# Patient Record
Sex: FEMALE | Race: BLACK OR AFRICAN AMERICAN | NOT HISPANIC OR LATINO
[De-identification: names, ages, dates, MRNs, and addresses within clinical notes are randomized per-mention and may not be internally consistent; named-entity substitution may affect disease eponyms.]

---

## 2017-04-09 ENCOUNTER — RESULT REVIEW (OUTPATIENT)
Age: 72
End: 2017-04-09

## 2017-09-28 ENCOUNTER — RESULT REVIEW (OUTPATIENT)
Age: 72
End: 2017-09-28

## 2019-04-23 ENCOUNTER — APPOINTMENT (OUTPATIENT)
Dept: ENDOCRINOLOGY | Facility: CLINIC | Age: 74
End: 2019-04-23
Payer: COMMERCIAL

## 2019-04-23 VITALS
BODY MASS INDEX: 26.5 KG/M2 | OXYGEN SATURATION: 97 % | HEART RATE: 89 BPM | WEIGHT: 161 LBS | SYSTOLIC BLOOD PRESSURE: 140 MMHG | RESPIRATION RATE: 16 BRPM | DIASTOLIC BLOOD PRESSURE: 70 MMHG | HEIGHT: 65.5 IN

## 2019-04-23 DIAGNOSIS — Z78.9 OTHER SPECIFIED HEALTH STATUS: ICD-10-CM

## 2019-04-23 DIAGNOSIS — E04.9 NONTOXIC GOITER, UNSPECIFIED: ICD-10-CM

## 2019-04-23 LAB — GLUCOSE BLDC GLUCOMTR-MCNC: 197

## 2019-04-23 PROCEDURE — 82962 GLUCOSE BLOOD TEST: CPT

## 2019-04-23 PROCEDURE — 99245 OFF/OP CONSLTJ NEW/EST HI 55: CPT | Mod: 25

## 2019-04-23 RX ORDER — AMLODIPINE BESYLATE 10 MG/1
10 TABLET ORAL
Qty: 30 | Refills: 0 | Status: DISCONTINUED | COMMUNITY
Start: 2019-02-23 | End: 2019-04-23

## 2019-04-23 RX ORDER — AZITHROMYCIN 250 MG/1
250 TABLET, FILM COATED ORAL
Qty: 6 | Refills: 0 | Status: DISCONTINUED | COMMUNITY
Start: 2019-02-02 | End: 2019-04-23

## 2019-04-23 NOTE — HISTORY OF PRESENT ILLNESS
[FreeTextEntry1] : HISTORY OF PRESENT ILLNESS. \par \par Ms. BARBER was diagnosed with Diabetes Mellitus Type 2 in 2014 \par Denies known complications of retinopathy, nephropathy, or neuropathy. \par Reports history HTN, dyslipidemia. Denies CAD. \par Presently on metformin 1g qd to bid, lisinopril 20mg qd. She's been previously on Januvia, but stopped because of GI distress\par Blood sugars are checked once a day. \par Did not bring log book, but reported are typically as following: FBS- 140's\par Hypoglycemia frequency: none\par Fingerstick glucose in the office today is 197 mg/dL 4 hours after eating. \par Diet:not following ADA. \par Exercise: none\par \par Last dilated eye - 04/19\par Last podiatry visit  - 2018\par Last cardiology evaluation - several years ago\par Last stress test - several years ago\par Last 2-D Echo - several years ago\par Last nephrology evaluation - none\par Last neurology evaluation- none\par \par Lab review: a1c- 8.9, ca- 10.7, LDL-  177, creatinine  0.82\par \par Cancer history: none\par Calcium supplementation: none\par Fracture history: traumatic left shoulder, right arm fractures\par Bone disease risk factors: no history of liver disease, kidney disease, thyroid disease, anticonvulsant use, glucocorticoid use, autoimmune disorders such as rheumatoid arthritis and SLE, COPD, IBD, known malabsorption disorders, or weight loss. \par Family history is negative for calcium disorders, nephrolithiasis, pancreatitis and tumors. \par Currently, no symptoms of polyuria, polydipsia, constipation, abdominal pain, mental confusion, depression, bone pain or fractures.

## 2019-04-23 NOTE — REASON FOR VISIT
[Consultation] : a consultation visit [FreeTextEntry1] : uncontrolled diabetes and hyperparathyroidism

## 2019-04-23 NOTE — CONSULT LETTER
[Dear  ___] : Dear  [unfilled], [Sincerely,] : Sincerely, [Courtesy Letter:] : I had the pleasure of seeing your patient, [unfilled], in my office today. [FreeTextEntry3] : Lynn Marina MD, FACE, ECNU\par  [FreeTextEntry1] : Thank you for referring  Ms. SYEDA BARBER to me for evaluation and treatment. Please, see attached consultation note. As always, if there are specific questions you would like to discuss, please feel free to contact me.\par Thank you for the courtesy of this evaluation.\par

## 2019-04-23 NOTE — ASSESSMENT
[FreeTextEntry1] : Current approaches to diabetes management are discussed with the patient. \par Target ranges for blood sugar, blood pressure and cholesterol reviewed, and risk reduction strategies verified. \par Hypoglycemia precautions reviewed with the patient. \par Suggested extensive diabetes education program, including nutritional and diabetes teaching and evaluation. \par Proper dietary restrictions and exercise routines discussed. \par Glucometer/SMBG and log book charting discussed.\par - resume metformin 1000mg bid, add [id number] qd and jardiance 10mg qd (R+B reviewed)\par - Hydration\par - lipitor 20mg HS\par - monitor BP, urine microalbumin\par - repeat calcium/ PTH, 25-OH-D  before next visit. Once D-repleted, will refer for a 24hrs U calcium\par - Thyroid and parathyroid US\par - DXA 3 sites\par - advised on vitamin D supplementation, weight-bearing exercises\par RTC 1 month, labs prior

## 2019-05-01 ENCOUNTER — FORM ENCOUNTER (OUTPATIENT)
Age: 74
End: 2019-05-01

## 2019-05-02 ENCOUNTER — OUTPATIENT (OUTPATIENT)
Dept: OUTPATIENT SERVICES | Facility: HOSPITAL | Age: 74
LOS: 1 days | End: 2019-05-02
Payer: COMMERCIAL

## 2019-05-02 ENCOUNTER — APPOINTMENT (OUTPATIENT)
Dept: RADIOLOGY | Facility: IMAGING CENTER | Age: 74
End: 2019-05-02
Payer: COMMERCIAL

## 2019-05-02 ENCOUNTER — APPOINTMENT (OUTPATIENT)
Dept: ULTRASOUND IMAGING | Facility: IMAGING CENTER | Age: 74
End: 2019-05-02
Payer: COMMERCIAL

## 2019-05-02 DIAGNOSIS — E11.65 TYPE 2 DIABETES MELLITUS WITH HYPERGLYCEMIA: ICD-10-CM

## 2019-05-02 DIAGNOSIS — E83.52 HYPERCALCEMIA: ICD-10-CM

## 2019-05-02 DIAGNOSIS — I10 ESSENTIAL (PRIMARY) HYPERTENSION: ICD-10-CM

## 2019-05-02 DIAGNOSIS — E78.5 HYPERLIPIDEMIA, UNSPECIFIED: ICD-10-CM

## 2019-05-02 DIAGNOSIS — E04.9 NONTOXIC GOITER, UNSPECIFIED: ICD-10-CM

## 2019-05-02 PROCEDURE — 76536 US EXAM OF HEAD AND NECK: CPT | Mod: 26

## 2019-05-02 PROCEDURE — 76536 US EXAM OF HEAD AND NECK: CPT

## 2019-05-02 PROCEDURE — 77080 DXA BONE DENSITY AXIAL: CPT

## 2019-05-02 PROCEDURE — 77080 DXA BONE DENSITY AXIAL: CPT | Mod: 26

## 2019-05-13 ENCOUNTER — APPOINTMENT (OUTPATIENT)
Dept: ENDOCRINOLOGY | Facility: CLINIC | Age: 74
End: 2019-05-13
Payer: COMMERCIAL

## 2019-05-13 VITALS
DIASTOLIC BLOOD PRESSURE: 60 MMHG | WEIGHT: 156 LBS | BODY MASS INDEX: 25.68 KG/M2 | SYSTOLIC BLOOD PRESSURE: 110 MMHG | HEIGHT: 65.5 IN

## 2019-05-13 LAB — GLUCOSE BLDC GLUCOMTR-MCNC: 92

## 2019-05-13 PROCEDURE — 82962 GLUCOSE BLOOD TEST: CPT

## 2019-05-13 PROCEDURE — 99213 OFFICE O/P EST LOW 20 MIN: CPT | Mod: 25

## 2019-05-13 NOTE — ASSESSMENT
[FreeTextEntry1] : - metformin 1000mg bid, onglyza 5mg qd, jardiance 10mg qd for now\par - Hydration\par - lipitor 20mg HS\par - monitor BP, urine microalbumin\par - repeat calcium/ PTH, 25-OH-D.  Once D-repleted, will refer for a 24hrs U calcium\par - Rpt Thyroid US in 6 mos (11/19) to f/u on b/l cysts\par - DXA 3 sites in 05/21\par - I advised to the patient on antiresorptive therapy, and reviewed potential options for osteoporosis treatment, including oral and IV bisphosphonates, prolia. R+B of each options were discussed in details\par - to take  OTC vitamin D3 1,000 IU/day\par - continue weight-bearing exercises; advised avoiding high risk sports\par - prior dental evaluation advised.\par - SHe's not a candidate for  rh-PTH therapy b/o hyperparathyroidism.\par - advised on vitamin D supplementation, weight-bearing exercises\par Patient wants to review her options and get back to me with the decision in 2-3 weeks

## 2019-05-13 NOTE — HISTORY OF PRESENT ILLNESS
[FreeTextEntry1] : On metformin 1g bid, onglyza 5mg, jardiance 10mg\par log: FBS- 125-160, p/B- 126-139, p/L- \par no rpt labs yet\par Thyr US (5/2/19)- b/l colloid cysts. No parathyroid adenoma\par DXA (5/2/19)- severe kyphosis with L1(-2.7), L2 (-3.9), FN (-2.8), radius 33% (-2.6)\par \par HISTORY OF PRESENT ILLNESS. \par \par Ms. BARBER was diagnosed with Diabetes Mellitus Type 2 in 2014 \par Denies known complications of retinopathy, nephropathy, or neuropathy. \par Reports history HTN, dyslipidemia. Denies CAD. \par Presently on metformin 1g qd to bid, lisinopril 20mg qd. She's been previously on Januvia, but stopped because of GI distress\par Blood sugars are checked once a day. \par Did not bring log book, but reported are typically as following: FBS- 140's\par Hypoglycemia frequency: none\par Diet:not following ADA. \par Exercise: none\par \par Last dilated eye - 04/19\par Last podiatry visit  - 2018\par Last cardiology evaluation - several years ago\par Last stress test - several years ago\par Last 2-D Echo - several years ago\par Last nephrology evaluation - none\par Last neurology evaluation- none\par \par Lab review: a1c- 8.9, ca- 10.7, LDL-  177, creatinine  0.82\par \par Cancer history: none\par Calcium supplementation: none\par Fracture history: traumatic left shoulder, right arm fractures\par Bone disease risk factors: no history of liver disease, kidney disease, thyroid disease, anticonvulsant use, glucocorticoid use, autoimmune disorders such as rheumatoid arthritis and SLE, COPD, IBD, known malabsorption disorders, or weight loss. \par Family history is negative for calcium disorders, nephrolithiasis, pancreatitis and tumors. \par Currently, no symptoms of polyuria, polydipsia, constipation, abdominal pain, mental confusion, depression, bone pain or fractures.

## 2019-05-18 ENCOUNTER — LABORATORY RESULT (OUTPATIENT)
Age: 74
End: 2019-05-18

## 2019-06-03 ENCOUNTER — APPOINTMENT (OUTPATIENT)
Dept: ENDOCRINOLOGY | Facility: CLINIC | Age: 74
End: 2019-06-03
Payer: COMMERCIAL

## 2019-06-03 VITALS
SYSTOLIC BLOOD PRESSURE: 146 MMHG | WEIGHT: 154 LBS | HEART RATE: 80 BPM | HEIGHT: 65.5 IN | DIASTOLIC BLOOD PRESSURE: 80 MMHG | BODY MASS INDEX: 25.35 KG/M2 | OXYGEN SATURATION: 96 % | RESPIRATION RATE: 16 BRPM

## 2019-06-03 LAB — GLUCOSE BLDC GLUCOMTR-MCNC: 86

## 2019-06-03 PROCEDURE — 82962 GLUCOSE BLOOD TEST: CPT

## 2019-06-03 PROCEDURE — 99213 OFFICE O/P EST LOW 20 MIN: CPT

## 2019-06-03 NOTE — ASSESSMENT
[FreeTextEntry1] : - metformin 1000mg bid, onglyza 5mg qd, jardiance 10mg qd for now\par - Hydration\par - lipitor 20mg HS\par - monitor BP, urine microalbumin\par - check 24hrs U calcium\par - parathyroid sestamibi scan\par - Rpt Thyroid US in 6 mos (11/19) to f/u on b/l cysts\par - DXA 3 sites in 05/21\par - I advised to the patient on antiresorptive therapy, and reviewed potential options for osteoporosis treatment, including oral and IV bisphosphonates, prolia. R+B of each options were discussed in details. Patient is still undecided on her treatment options, asking for more time to consider\par - to take  OTC vitamin D3 1,000 IU/day\par - continue weight-bearing exercises; advised avoiding high risk sports\par - prior dental evaluation advised.\par - She's not a candidate for  rh-PTH therapy b/o hyperparathyroidism.\par RTC post scan

## 2019-06-03 NOTE — HISTORY OF PRESENT ILLNESS
[FreeTextEntry1] : On metformin 1g bid, onglyza 5mg, jardiance 10mg\par stopped checking BS at home\par today ppg in the office- 86\par \par Thyr US (5/2/19)- b/l colloid cysts. No parathyroid adenoma\par DXA (5/2/19)- severe kyphosis with L1(-2.7), L2 (-3.9), FN (-2.8), radius 33% (-2.6)\par \par ca- 11.2, PTH- 95, 25D- 23.8\par \par HISTORY OF PRESENT ILLNESS. \par \par Ms. BARBER was diagnosed with Diabetes Mellitus Type 2 in 2014 \par Denies known complications of retinopathy, nephropathy, or neuropathy. \par Reports history HTN, dyslipidemia. Denies CAD. \par Presently on metformin 1g qd to bid, lisinopril 20mg qd. She's been previously on Januvia, but stopped because of GI distress\par Blood sugars are checked once a day. \par \par Hypoglycemia frequency: none\par Diet:not following ADA. \par Exercise: none\par \par Last dilated eye - 04/19\par Last podiatry visit  - 2018\par Last cardiology evaluation - several years ago\par Last stress test - several years ago\par Last 2-D Echo - several years ago\par Last nephrology evaluation - none\par Last neurology evaluation- none\par \par Lab review: a1c- 8.9, ca- 10.7, LDL-  177, creatinine  0.82\par \par Cancer history: none\par Calcium supplementation: none\par Fracture history: traumatic left shoulder, right arm fractures\par Bone disease risk factors: no history of liver disease, kidney disease, thyroid disease, anticonvulsant use, glucocorticoid use, autoimmune disorders such as rheumatoid arthritis and SLE, COPD, IBD, known malabsorption disorders, or weight loss. \par Family history is negative for calcium disorders, nephrolithiasis, pancreatitis and tumors. \par Currently, no symptoms of polyuria, polydipsia, constipation, abdominal pain, mental confusion, depression, bone pain or fractures.

## 2019-06-21 LAB
CALCIUM SERPL-MCNC: 11.1 MG/DL
CAU: 5 MG/DL
CREAT 24H UR-MCNC: 0.9 G/24 H
CREAT 24H UR-MCNC: 0.9 G/24 H
CREAT ?TM UR-MCNC: 77 MG/DL
CREAT ?TM UR-MCNC: 77 MG/DL
CREAT SERPL-MCNC: 0.77 MG/DL
PROT ?TM UR-MCNC: 24 HR
PROT ?TM UR-MCNC: 24 HR
SPECIMEN VOL 24H UR: 1175 ML
SPECIMEN VOL 24H UR: 1175 ML
SPECIMEN VOL 24H UR: 59 MG/24 H

## 2019-07-19 ENCOUNTER — APPOINTMENT (OUTPATIENT)
Dept: ENDOCRINOLOGY | Facility: CLINIC | Age: 74
End: 2019-07-19
Payer: COMMERCIAL

## 2019-07-19 VITALS
RESPIRATION RATE: 16 BRPM | WEIGHT: 153 LBS | DIASTOLIC BLOOD PRESSURE: 86 MMHG | HEART RATE: 84 BPM | HEIGHT: 65.5 IN | OXYGEN SATURATION: 96 % | SYSTOLIC BLOOD PRESSURE: 160 MMHG | BODY MASS INDEX: 25.19 KG/M2

## 2019-07-19 LAB — GLUCOSE BLDC GLUCOMTR-MCNC: 143

## 2019-07-19 PROCEDURE — 99214 OFFICE O/P EST MOD 30 MIN: CPT

## 2019-07-19 PROCEDURE — 82962 GLUCOSE BLOOD TEST: CPT

## 2019-07-19 NOTE — HISTORY OF PRESENT ILLNESS
[FreeTextEntry1] : On metformin 1g bid\par states that  jardiance and onglyza are not covered. \par stopped checking BS at home\par today ppg in the office- 143\par did not do a parathyroid scan yet\par Thyr US (5/2/19)- b/l colloid cysts. No parathyroid adenoma\par DXA (5/2/19)- severe kyphosis with L1(-2.7), L2 (-3.9), FN (-2.8), radius 33% (-2.6)\par \par ca- 11.1 <-- 11.2, PTH- 95, 25D- 23.8\par 24h Uca- 59\par Ca/creat ratio- 0.004\par \par HISTORY OF PRESENT ILLNESS. \par \par Ms. BARBER was diagnosed with Diabetes Mellitus Type 2 in 2014 \par Denies known complications of retinopathy, nephropathy, or neuropathy. \par Reports history HTN, dyslipidemia. Denies CAD. \par Presently on metformin 1g qd to bid, lisinopril 20mg qd. She's been previously on Januvia, but stopped because of GI distress\par Blood sugars are checked once a day. \par \par Hypoglycemia frequency: none\par Diet:not following ADA. \par Exercise: none\par \par Last dilated eye - 04/19\par Last podiatry visit  - 2018\par Last cardiology evaluation - several years ago\par Last stress test - several years ago\par Last 2-D Echo - several years ago\par Last nephrology evaluation - none\par Last neurology evaluation- none\par \par Lab review: a1c- 8.9, ca- 10.7, LDL-  177, creatinine  0.82\par \par Cancer history: none\par Calcium supplementation: none\par Fracture history: traumatic left shoulder, right arm fractures\par Bone disease risk factors: no history of liver disease, kidney disease, thyroid disease, anticonvulsant use, glucocorticoid use, autoimmune disorders such as rheumatoid arthritis and SLE, COPD, IBD, known malabsorption disorders, or weight loss. \par Family history is negative for calcium disorders, nephrolithiasis, pancreatitis and tumors. \par Currently, no symptoms of polyuria, polydipsia, constipation, abdominal pain, mental confusion, depression, bone pain or fractures.

## 2019-07-19 NOTE — ASSESSMENT
[FreeTextEntry1] : - metformin 1000mg bid, resume samples  jardiance 10mg qd for now until  figure out what's covered\par - tradjenta 5mg qd\par - Hydration !!! \par - lipitor 20mg HS\par - monitor BP, urine microalbumin\par - might need to repeat a  24hrs U calcium once D- repleted. If still with underexcretion of Ca, will send for CASR\par - needs a parathyroid sestamibi scan\par - Rpt Thyroid US in 6 mos (11/19) to f/u on b/l cysts\par - DXA 3 sites in 05/21\par - I've advised again to the patient on antiresorptive therapy, and reviewed potential options for osteoporosis treatment, including oral and IV bisphosphonates, prolia. R+B of each options were discussed in details. Patient is still undecided on her treatment options, asking for more time to consider\par - to take  OTC vitamin D3 1,000 IU/day\par - continue weight-bearing exercises; advised avoiding high risk sports\par - prior dental evaluation advised.\par - She's not a candidate for  rh-PTH therapy b/o hyperparathyroidism.\par RTC post scan

## 2019-07-30 ENCOUNTER — MEDICATION RENEWAL (OUTPATIENT)
Age: 74
End: 2019-07-30

## 2019-09-06 ENCOUNTER — FORM ENCOUNTER (OUTPATIENT)
Age: 74
End: 2019-09-06

## 2019-09-07 ENCOUNTER — APPOINTMENT (OUTPATIENT)
Dept: NUCLEAR MEDICINE | Facility: IMAGING CENTER | Age: 74
End: 2019-09-07
Payer: COMMERCIAL

## 2019-09-07 ENCOUNTER — OUTPATIENT (OUTPATIENT)
Dept: OUTPATIENT SERVICES | Facility: HOSPITAL | Age: 74
LOS: 1 days | End: 2019-09-07
Payer: COMMERCIAL

## 2019-09-07 DIAGNOSIS — E78.5 HYPERLIPIDEMIA, UNSPECIFIED: ICD-10-CM

## 2019-09-07 DIAGNOSIS — E11.65 TYPE 2 DIABETES MELLITUS WITH HYPERGLYCEMIA: ICD-10-CM

## 2019-09-07 DIAGNOSIS — E83.52 HYPERCALCEMIA: ICD-10-CM

## 2019-09-07 DIAGNOSIS — M81.0 AGE-RELATED OSTEOPOROSIS WITHOUT CURRENT PATHOLOGICAL FRACTURE: ICD-10-CM

## 2019-09-07 PROCEDURE — 78072 PARATHYRD PLANAR W/SPECT&CT: CPT | Mod: 26

## 2019-09-07 PROCEDURE — 78072 PARATHYRD PLANAR W/SPECT&CT: CPT

## 2019-09-07 PROCEDURE — A9512: CPT

## 2019-09-07 PROCEDURE — A9500: CPT

## 2019-11-04 ENCOUNTER — RESULT REVIEW (OUTPATIENT)
Age: 74
End: 2019-11-04

## 2021-07-03 ENCOUNTER — RESULT REVIEW (OUTPATIENT)
Age: 76
End: 2021-07-03

## 2021-07-26 ENCOUNTER — APPOINTMENT (OUTPATIENT)
Dept: ENDOCRINOLOGY | Facility: CLINIC | Age: 76
End: 2021-07-26

## 2021-09-22 ENCOUNTER — EMERGENCY (EMERGENCY)
Facility: HOSPITAL | Age: 76
LOS: 0 days | Discharge: HOME | End: 2021-09-22
Attending: STUDENT IN AN ORGANIZED HEALTH CARE EDUCATION/TRAINING PROGRAM
Payer: OTHER MISCELLANEOUS

## 2021-09-22 VITALS
HEIGHT: 65 IN | SYSTOLIC BLOOD PRESSURE: 176 MMHG | RESPIRATION RATE: 18 BRPM | WEIGHT: 149.91 LBS | TEMPERATURE: 97 F | HEART RATE: 88 BPM | DIASTOLIC BLOOD PRESSURE: 90 MMHG | OXYGEN SATURATION: 99 %

## 2021-09-22 DIAGNOSIS — M54.5 LOW BACK PAIN: ICD-10-CM

## 2021-09-22 DIAGNOSIS — Z91.013 ALLERGY TO SEAFOOD: ICD-10-CM
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
## 2021-09-22 DIAGNOSIS — M54.9 DORSALGIA, UNSPECIFIED: ICD-10-CM

## 2021-09-22 DIAGNOSIS — Z88.1 ALLERGY STATUS TO OTHER ANTIBIOTIC AGENTS STATUS: ICD-10-CM

## 2021-09-22 DIAGNOSIS — R51.9 HEADACHE, UNSPECIFIED: ICD-10-CM
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
## 2021-09-22 DIAGNOSIS — E11.9 TYPE 2 DIABETES MELLITUS WITHOUT COMPLICATIONS: ICD-10-CM
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
## 2021-09-22 DIAGNOSIS — I10 ESSENTIAL (PRIMARY) HYPERTENSION: ICD-10-CM

## 2021-09-22 DIAGNOSIS — M41.9 SCOLIOSIS, UNSPECIFIED: ICD-10-CM

## 2021-09-22 DIAGNOSIS — E78.5 HYPERLIPIDEMIA, UNSPECIFIED: ICD-10-CM

## 2021-09-22 PROCEDURE — 72128 CT CHEST SPINE W/O DYE: CPT | Mod: 26,MC

## 2021-09-22 PROCEDURE — 72131 CT LUMBAR SPINE W/O DYE: CPT | Mod: 26,MC

## 2021-09-22 PROCEDURE — 72125 CT NECK SPINE W/O DYE: CPT | Mod: 26,MC

## 2021-09-22 PROCEDURE — 70450 CT HEAD/BRAIN W/O DYE: CPT | Mod: 26,MC

## 2021-09-22 PROCEDURE — 99285 EMERGENCY DEPT VISIT HI MDM: CPT

## 2021-09-22 RX ORDER — METHOCARBAMOL 500 MG/1
1 TABLET, FILM COATED ORAL
Qty: 14 | Refills: 0
Start: 2021-09-22 | End: 2021-09-28

## 2021-09-22 RX ORDER — LIDOCAINE 4 G/100G
1 CREAM TOPICAL ONCE
Refills: 0 | Status: COMPLETED | OUTPATIENT
Start: 2021-09-22 | End: 2021-09-22

## 2021-09-22 RX ORDER — ACETAMINOPHEN 500 MG
650 TABLET ORAL ONCE
Refills: 0 | Status: COMPLETED | OUTPATIENT
Start: 2021-09-22 | End: 2021-09-22

## 2021-09-22 RX ORDER — IBUPROFEN 200 MG
1 TABLET ORAL
Qty: 30 | Refills: 0
Start: 2021-09-22 | End: 2021-09-26

## 2021-09-22 RX ADMIN — LIDOCAINE 1 PATCH: 4 CREAM TOPICAL at 15:38

## 2021-09-22 RX ADMIN — Medication 650 MILLIGRAM(S): at 15:37

## 2021-09-22 NOTE — ED ADULT NURSE NOTE - OBJECTIVE STATEMENT
pt a&O x3 trip and fall  today at work. now c.o of right lower back pain 10/10 and head pain. NO LOC. no blood tinner use.

## 2021-09-22 NOTE — ED PROVIDER NOTE - CLINICAL SUMMARY MEDICAL DECISION MAKING FREE TEXT BOX
78yo F w/ PMH HTN, HLD, DM pw head and back pain s/p mGLF at work this AM. AFVSS. Well appearing, in NAD. Exam as noted in PE. Plan: CT brain, C/T/L spine. Give Tylenol, Lidoderm. Re-eval.

## 2021-09-22 NOTE — ED PROVIDER NOTE - OBJECTIVE STATEMENT
74yo F w/ PMH HTN, DM, HLD pw head and back pain s/p mGLF at work this AM. 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 Pt fell backwards, striking L occiput on wall, and landing on back. Pt reports able to ambulate, but pain w/ getting up from laying / sitting. Denies LOC, not on AC. Occurred 0800 this AM. No meds PTA. Pt went to urgent care, sent to ED for imaging studies.     PMH as above, PSH L knee, L wrist, meds as listed, allergies as listed.

## 2021-09-22 NOTE — ED PROVIDER NOTE - PHYSICAL EXAMINATION
GEN: Awake, alert, interactive, NAD.  HEAD AND NECK: NC/AT. Airway patent. Neck supple.   EYES:  Clear b/l. EOMI. PERRL.   ENT: Moist mucus membranes.   CARDIAC: Regular rate, regular rhythm. No evident pedal edema.    RESP/CHEST: Normal respiratory effort with no use of accessory muscles or retractions. Clear throughout on auscultation.  ABD: Soft, non-distended, non-tender. No rebound, no guarding.   BACK: No midline spinal TTP. No CVAT. + TTP BL para lumbar musculature. + scoliosis.   EXTREMITIES: Moving all extremities with no apparent deformities.   SKIN: Warm, dry, intact normal color. No rash.   NEURO: AOx3, CN II-XII grossly intact, no focal deficits.   PSYCH: Appropriate mood and affect.

## 2021-09-22 NOTE — ED PROVIDER NOTE - PATIENT PORTAL LINK FT
You can access the FollowMyHealth Patient Portal offered by [facility] by registering at the following website: [email or web address]. By joining [facility]’s FollowMyHealth portal, you will also be able to view your health information using other applications (apps) compatible with our system.

## 2021-09-22 NOTE — ED ADULT TRIAGE NOTE - CHIEF COMPLAINT QUOTE
c/o back pain head injury s/p trip and fall at work this morning denies loc denies dizziness denies anticoagulation use

## 2022-01-10 ENCOUNTER — APPOINTMENT (OUTPATIENT)
Dept: ENDOCRINOLOGY | Facility: CLINIC | Age: 77
End: 2022-01-10

## 2023-03-15 PROBLEM — I10 ESSENTIAL (PRIMARY) HYPERTENSION: Chronic | Status: ACTIVE | Noted: 2021-09-22

## 2023-03-15 PROBLEM — E11.9 TYPE 2 DIABETES MELLITUS WITHOUT COMPLICATIONS: Chronic | Status: ACTIVE | Noted: 2021-09-22

## 2023-03-15 PROBLEM — E78.5 HYPERLIPIDEMIA, UNSPECIFIED: Chronic | Status: ACTIVE | Noted: 2021-09-22

## 2023-03-16 ENCOUNTER — APPOINTMENT (OUTPATIENT)
Dept: ENDOCRINOLOGY | Facility: CLINIC | Age: 78
End: 2023-03-16

## 2023-05-01 ENCOUNTER — APPOINTMENT (OUTPATIENT)
Dept: ENDOCRINOLOGY | Facility: CLINIC | Age: 78
End: 2023-05-01
Payer: MEDICARE

## 2023-05-01 VITALS
HEART RATE: 92 BPM | SYSTOLIC BLOOD PRESSURE: 146 MMHG | DIASTOLIC BLOOD PRESSURE: 84 MMHG | WEIGHT: 157.56 LBS | BODY MASS INDEX: 25.94 KG/M2 | HEIGHT: 65.5 IN | OXYGEN SATURATION: 98 %

## 2023-05-01 LAB
GLUCOSE BLDC GLUCOMTR-MCNC: 256
GLUCOSE BLDC GLUCOMTR-MCNC: 312

## 2023-05-01 PROCEDURE — 99204 OFFICE O/P NEW MOD 45 MIN: CPT | Mod: 25

## 2023-05-01 PROCEDURE — 82962 GLUCOSE BLOOD TEST: CPT

## 2023-05-01 PROCEDURE — 36415 COLL VENOUS BLD VENIPUNCTURE: CPT

## 2023-05-01 NOTE — HISTORY OF PRESENT ILLNESS
[FreeTextEntry1] : Patient is reconsulted for multiple medical issues\par \par *** May 01, 2023 ***\par \par last seen in 2019\par reconsulted for diabetic and HPT management\par no recent labs. Thinks she had DXA recently, but is not aware of the results\par taking Metformin 1000 mg qd only, Lisinopril 20 mg qd, Fosamax 70 mg qw (started in 09/21)\par thinks that also takes "another diabetic medication", but not sure exactly which one. She does not know whether she's taking statins\par \par Parathyroid scan (9/7/19)- parathyroid lesion posterior to RLP and LUP. ? possible 3rd lesion inferior to the LLP\par \par *** July 19, 2019 ***\par \par On metformin 1g bid\par states that  jardiance and onglyza are not covered. \par stopped checking BS at home\par today ppg in the office- 143\par did not do a parathyroid scan yet\par Thyr US (5/2/19)- b/l colloid cysts. No parathyroid adenoma\par DXA (5/2/19)- severe kyphosis with L1(-2.7), L2 (-3.9), FN (-2.8), radius 33% (-2.6)\par \par ca- 11.1 <-- 11.2, PTH- 95, 25D- 23.8\par 24h Uca- 59\par Ca/creat ratio- 0.004\par \par HISTORY OF PRESENT ILLNESS. \par \par Ms. BARBER was diagnosed with Diabetes Mellitus Type 2 in 2014 \par Denies known complications of retinopathy, nephropathy, or neuropathy. \par Reports history HTN, dyslipidemia. Denies CAD. \par Presently on metformin 1g qd to bid, lisinopril 20mg qd. She's been previously on Januvia, but stopped because of GI distress\par Blood sugars are checked once a day. \par \par Hypoglycemia frequency: none\par Diet:not following ADA. \par Exercise: none\par \par Lab review: a1c- 8.9, ca- 10.7, LDL-  177, creatinine  0.82\par \par Cancer history: none\par Calcium supplementation: none\par Fracture history: traumatic left shoulder, right arm fractures\par Bone disease risk factors: no history of liver disease, kidney disease, thyroid disease, anticonvulsant use, glucocorticoid use, autoimmune disorders such as rheumatoid arthritis and SLE, COPD, IBD, known malabsorption disorders, or weight loss. \par Family history is negative for calcium disorders, nephrolithiasis, pancreatitis and tumors. \par Currently, no symptoms of polyuria, polydipsia, constipation, abdominal pain, mental confusion, depression, bone pain or fractures. \par \par ****\par \par Last dilated eye - 02/23\par Last podiatry visit  - 2018\par Last cardiology evaluation - several years ago\par Last stress test - several years ago\par Last 2-D Echo - several years ago\par Last nephrology evaluation - none\par Last neurology evaluation- none

## 2023-05-01 NOTE — REASON FOR VISIT
[Consultation] : a consultation visit [DM Type 2] : DM Type 2 [Osteoporosis] : osteoporosis [Hyperparathyroidism] : hyperparathyroidism

## 2023-05-01 NOTE — ASSESSMENT
[Diabetes Foot Care] : diabetes foot care [Long Term Vascular Complications] : long term vascular complications of diabetes [Carbohydrate Consistent Diet] : carbohydrate consistent diet [Importance of Diet and Exercise] : importance of diet and exercise to improve glycemic control, achieve weight loss and improve cardiovascular health [Exercise/Effect on Glucose] : exercise/effect on glucose [Hypoglycemia Management] : hypoglycemia management [Glucagon Use] : glucagon use [Self Monitoring of Blood Glucose] : self monitoring of blood glucose [Retinopathy Screening] : Patient was referred to ophthalmology for retinopathy screening [Diabetic Medications] : Risks and benefits of diabetic medications were discussed [FreeTextEntry1] : 1. T2DM, uncontrolled\par - very poor adherence - compliance is reiterated\par - at present I need more information about her FS, labs and  and exact medications that she takes\par - increase metformin 1000 mg bid for now\par - bring meds for review. Consider resuming  DPP4i or GLP1RA\par - Alice PRO\par - Hydration !!! \par - lipitor 20mg HS\par - monitor BP, urine microalbumin\par \par 2. Hypercalcemia\par - might need to repeat a  24hrs U calcium once D- repleted. If still with underexcretion of Ca, will send for CASR\par \par 3. Osteoporosis\par - obtain most recent DXA report\par - DXA 3 sites in 05/21\par - on Fosamax since 09/2021\par - to take  OTC vitamin D3 1,000 IU/day\par - continue weight-bearing exercises; advised avoiding high risk sports\par - routine dental evaluation advised.\par - She's not a candidate for  rh-PTH therapy b/o hyperparathyroidism.\par \par 4. MNG\par - Rpt Thyroid US  to f/u on b/l cysts\par \par RTC post tests

## 2023-05-02 LAB
25(OH)D3 SERPL-MCNC: 23.7 NG/ML
ALBUMIN SERPL ELPH-MCNC: 4.5 G/DL
ALP BLD-CCNC: 77 U/L
ALT SERPL-CCNC: 16 U/L
ANION GAP SERPL CALC-SCNC: 15 MMOL/L
AST SERPL-CCNC: 18 U/L
BASOPHILS # BLD AUTO: 0.06 K/UL
BASOPHILS NFR BLD AUTO: 0.6 %
BILIRUB SERPL-MCNC: 0.2 MG/DL
BUN SERPL-MCNC: 20 MG/DL
C PEPTIDE SERPL-MCNC: 5.3 NG/ML
CA-I SERPL-SCNC: 5.7 MG/DL
CALCIUM SERPL-MCNC: 11.1 MG/DL
CALCIUM SERPL-MCNC: 11.1 MG/DL
CHLORIDE SERPL-SCNC: 101 MMOL/L
CHOLEST SERPL-MCNC: 287 MG/DL
CO2 SERPL-SCNC: 22 MMOL/L
CREAT SERPL-MCNC: 0.91 MG/DL
CREAT SPEC-SCNC: 108 MG/DL
EGFR: 65 ML/MIN/1.73M2
EOSINOPHIL # BLD AUTO: 0.17 K/UL
EOSINOPHIL NFR BLD AUTO: 1.8 %
ESTIMATED AVERAGE GLUCOSE: 272 MG/DL
FOLATE SERPL-MCNC: 18.9 NG/ML
FRUCTOSAMINE SERPL-MCNC: 475 UMOL/L
GLUCOSE SERPL-MCNC: 315 MG/DL
GLYCOMARK.: 4.9 UG/ML
HBA1C MFR BLD HPLC: 11.1 %
HCT VFR BLD CALC: 38.1 %
HDLC SERPL-MCNC: 60 MG/DL
HGB BLD-MCNC: 12.4 G/DL
IMM GRANULOCYTES NFR BLD AUTO: 0.3 %
LDLC SERPL CALC-MCNC: 195 MG/DL
LYMPHOCYTES # BLD AUTO: 3.65 K/UL
LYMPHOCYTES NFR BLD AUTO: 37.7 %
MAN DIFF?: NORMAL
MCHC RBC-ENTMCNC: 28.4 PG
MCHC RBC-ENTMCNC: 32.5 GM/DL
MCV RBC AUTO: 87.2 FL
MICROALBUMIN 24H UR DL<=1MG/L-MCNC: 3.2 MG/DL
MICROALBUMIN/CREAT 24H UR-RTO: 29 MG/G
MONOCYTES # BLD AUTO: 0.5 K/UL
MONOCYTES NFR BLD AUTO: 5.2 %
NEUTROPHILS # BLD AUTO: 5.26 K/UL
NEUTROPHILS NFR BLD AUTO: 54.4 %
NONHDLC SERPL-MCNC: 227 MG/DL
PARATHYROID HORMONE INTACT: 104 PG/ML
PLATELET # BLD AUTO: 201 K/UL
POTASSIUM SERPL-SCNC: 4.2 MMOL/L
PROT SERPL-MCNC: 7.8 G/DL
RBC # BLD: 4.37 M/UL
RBC # FLD: 14.6 %
SODIUM SERPL-SCNC: 137 MMOL/L
T4 FREE SERPL-MCNC: 1.3 NG/DL
TRIGL SERPL-MCNC: 159 MG/DL
TSH SERPL-ACNC: 1.9 UIU/ML
VIT B12 SERPL-MCNC: 440 PG/ML
WBC # FLD AUTO: 9.67 K/UL

## 2023-05-15 ENCOUNTER — APPOINTMENT (OUTPATIENT)
Dept: ENDOCRINOLOGY | Facility: CLINIC | Age: 78
End: 2023-05-15
Payer: MEDICARE

## 2023-05-15 VITALS
HEART RATE: 78 BPM | SYSTOLIC BLOOD PRESSURE: 148 MMHG | OXYGEN SATURATION: 97 % | WEIGHT: 156 LBS | TEMPERATURE: 97.6 F | BODY MASS INDEX: 25.99 KG/M2 | DIASTOLIC BLOOD PRESSURE: 90 MMHG | HEIGHT: 65 IN

## 2023-05-15 LAB — GLUCOSE BLDC GLUCOMTR-MCNC: 185

## 2023-05-15 PROCEDURE — 99214 OFFICE O/P EST MOD 30 MIN: CPT | Mod: 25

## 2023-05-15 PROCEDURE — 82962 GLUCOSE BLOOD TEST: CPT

## 2023-05-15 RX ORDER — REPAGLINIDE 1 MG/1
1 TABLET ORAL
Qty: 270 | Refills: 2 | Status: ACTIVE | COMMUNITY
Start: 2023-05-15 | End: 1900-01-01

## 2023-05-15 RX ORDER — UBIDECARENONE/VIT E ACET 100MG-5
25 MCG CAPSULE ORAL
Qty: 90 | Refills: 2 | Status: ACTIVE | COMMUNITY
Start: 2023-05-15 | End: 1900-01-01

## 2023-05-15 NOTE — HISTORY OF PRESENT ILLNESS
[FreeTextEntry1] : ***May 15, 2023***\par Ms. MCDONALD is a 77 year old female, she arrives today for follow up evaluation of glycemic control.   She reports history HTN, dyslipidemia, and denies CAD,  known complications of retinopathy, nephropathy, or neuropathy. She is presently on Metformin 1000mg BID, she denies taking Lipitor 20mg as prescribed.  Ms. Mcdonald denies any new complaints and is well appearing in good spirits.\par She is wearing a SUNITHA PRO but does not check BG via fingersticks\par Hypoglycemia frequency:She denies subjective lows\par Fingerstick glucose in the office today is  185 mg/dL fasting\par Diet: not following ADA\par Exercise: no\par Lab review: a1c- 11.1% 5/01/23\par SUNITHA PRO\par Date of download:  05/15/2023\par Diabetes Medications and Dosage: as above\par Indication for CGMS: verify a change in the treatment regimen, identify periods of hypoglycemia/ hyperglycemia.\par Modal day report: pattern.\par Pt with HYPO  2% of the time ( NONE below 54),  66% in target range\par Hyperglycemia:  32% elevation\par Identified issues: carbohydrate counting, medication compliance\par dates analyzed: 05/01/2023 - 05/14/2023\par \par Last dilated eye -\par Last podiatry visit  -\par Last cardiology evaluation -\par Last stress test -\par Last 2-D Echo -\par Last nephrology evaluation -\par Last neurology evaluation-\par \par ***May 01, 2023***\par Per Dr. Marina:\par last seen 2019\par reconsulted for Diabetic and Hyperparathyroid management\par no recent labs. Thinks she had DXA recently but is not aware of results\par taking metformin 1000mg qd only, Lisinopril 20 mg qd, Fosamax 70mg qw (started 09/21)\par thinks that she also takes "another diabetic medication" but not sure which one, She doesn't know if currently taking statins.\par   --Addendum to visit: Sunitha PRo was placed at end of visit, blood work results communicated to patient by ELFEGO Babb, A1C is 11.1%, advised to begin 20 u basal insulin and GLP1Ra , However Pt wanted to consider.

## 2023-05-15 NOTE — PROCEDURE
[FreeTextEntry1] : General: In no acute distress.\par Head: Normocephalic\par Skin: Normal, no bruises. There are no cushingoid features\par Eyes: No pallor, lid lag or orbitopathy.\par Lymph nodes: no palpable neck lymphadenopathy.\par Chest: Lungs clear to auscultation\par Heart: S1, S2, normal rate rhythm\par Lower Extremities: No edema, no cyanosis

## 2023-05-15 NOTE — ASSESSMENT
[FreeTextEntry1] : T2DM uncontrolled\par -Ms. Mcdonald struggles with compliance to medication regimens and is especially opposed to giving herself injections, does not think she would be able to do it.  I believe she is unlikely to consistently adhere to injection therapy at this time.  Furthermore her cgm reports shows morning lows (70mg/dL) about half the time.  I am hesitant to start Ms Mcdonald, 77 yrs old on insulin or GLP1RA therapy at this time and instead advised on a simplified oral medication regimen to address IR and post prandial BG spikes.\par -Discontinue Metformin\par -Begin Janumet 50-1000mg BID with food\par -Repaglinide 1mg TID ac Pre-meals, Pt verbalizes understanding that she should skip repaglinide dose if skipping a meal.\par -Begin Vitamin D3 1000u daily\par Risks and benefits of diabetic medications were discussed.  Pathology of Diabetes, consequences of prolonged hyperglycemia discussed.  \par \par HLD\par -Resume Lipitor 20 mg qhs\par \par Hyperparathyroid\par -Per Dr. Marina: Take Vitamin D3 for one month then repeat 24-hour urine collection calcium.  \par Will provide urine collection container next visit.\par -Pt will forward results of recent DXA \par \par RTC 1 MONTH\par \par Diabetes Counseling: The patient was counseled on diabetes foot care, long term vascular complications of diabetes, carbohydrate consistent diet, importance of diet and exercise to improve glycemic control, achieve weight loss and improve cardiovascular health, exercise/effect on glucose, hypoglycemia management, glucagon use, ketone testing, action and use of short and long-acting insulin, self monitoring of blood glucose, insulin self-administration, injection technique, storage, and sharps disposal and sick-day management.  Patient was referred to ophthalmology for retinopathy screening. \par \par Case and Plan discussed with Dr. Marina.

## 2023-05-16 LAB
MISCELLANEOUS TEST: NORMAL
PROC NAME: NORMAL

## 2023-05-23 ENCOUNTER — APPOINTMENT (OUTPATIENT)
Dept: ENDOCRINOLOGY | Facility: CLINIC | Age: 78
End: 2023-05-23
Payer: MEDICARE

## 2023-05-23 PROCEDURE — G0108 DIAB MANAGE TRN  PER INDIV: CPT

## 2023-06-20 ENCOUNTER — APPOINTMENT (OUTPATIENT)
Dept: ENDOCRINOLOGY | Facility: CLINIC | Age: 78
End: 2023-06-20
Payer: MEDICARE

## 2023-06-20 PROCEDURE — G0108 DIAB MANAGE TRN  PER INDIV: CPT | Mod: GA

## 2023-06-26 ENCOUNTER — APPOINTMENT (OUTPATIENT)
Dept: ENDOCRINOLOGY | Facility: CLINIC | Age: 78
End: 2023-06-26
Payer: MEDICARE

## 2023-06-26 VITALS
HEIGHT: 65 IN | DIASTOLIC BLOOD PRESSURE: 92 MMHG | SYSTOLIC BLOOD PRESSURE: 168 MMHG | TEMPERATURE: 98 F | WEIGHT: 157.5 LBS | HEART RATE: 87 BPM | BODY MASS INDEX: 26.24 KG/M2 | OXYGEN SATURATION: 98 %

## 2023-06-26 LAB
GLUCOSE BLDC GLUCOMTR-MCNC: 165
HBA1C MFR BLD HPLC: 8.3

## 2023-06-26 PROCEDURE — 95251 CONT GLUC MNTR ANALYSIS I&R: CPT

## 2023-06-26 PROCEDURE — 83036 HEMOGLOBIN GLYCOSYLATED A1C: CPT | Mod: QW

## 2023-06-26 PROCEDURE — 82962 GLUCOSE BLOOD TEST: CPT

## 2023-06-26 PROCEDURE — 99214 OFFICE O/P EST MOD 30 MIN: CPT | Mod: 25

## 2023-06-26 NOTE — ASSESSMENT
[FreeTextEntry1] : Uncontrolled T2DM\par Pt applauded for improvement in adherence and glycemic control.  However compliance remains an issue.\par -Scanning frequency reinforced (wake up, with every meal, and at bedtime)\par -Continue current Janumet  BID regimen\par -Reinforced importance of adherence to Repaglinide 1mg TID premeal regimen\par -24 hr urine calcium and Creatinine collection procedure explained requisition form and container provided.\par \par RTC 1 month

## 2023-06-26 NOTE — HISTORY OF PRESENT ILLNESS
[FreeTextEntry1] : Ms. BARBER  arrives today for follow up evaluation of glycemic control.   She is presently on Janumet  mg BID, Repaglinide 1mg TID with meals and vitamin D3 1000 u qd, She is wearing a Alice 2 cgm.  SHe reports 100% compliance with Janumet and reports missing Repaglinide a few times per week.  SHe is wearing a ALICE 2 cgm with poor scanning compliance.  \par Hypoglycemia frequency: Pt denies subjective HYPOs\par Fingerstick glucose in the office today is  165 mg/dL 2 hours after eating lunch with cake.\par Diet: Following CDE/RD carbohydrate recommendations.  \par Exercise: no\par Lab review: a1c- Previously 11.1%, POCT A1C in office today is 8.3%\par ALICE 2\par Date of download:  06/26/2023\par Diabetes Medications and Dosage: as above\par Indication for CGMS: verify a change in the treatment regimen, identify periods of hypoglycemia/ hyperglycemia.\par Modal day report: pattern.\par Pt with HYPO  5% of the time ( NONE below 54),  86% in target range\par Hyperglycemia:  9% elevation\par Identified issues: carbohydrate counting\par dates analyzed: 06/13/2023 - 06/26/2023\par TIME CGM active 25%

## 2023-06-26 NOTE — PROCEDURE
[FreeTextEntry1] : General: In no acute distress.\par \par Head: Normocephalic\par \par Skin: Normal, no bruises. There are no cushingoid features\par \par Eyes: No pallor, lid lag or orbitopathy.\par \par Foot exam: No ulcers, no onychomycotic nail changes. Good pedal pulses. 10-g [place]-Fay monofilament testing is normal bilaterally. Vibratory sensation with 128-Hz tuning fork is preserved bilaterally.\par

## 2023-07-31 ENCOUNTER — RESULT CHARGE (OUTPATIENT)
Age: 78
End: 2023-07-31

## 2023-07-31 ENCOUNTER — APPOINTMENT (OUTPATIENT)
Dept: ENDOCRINOLOGY | Facility: CLINIC | Age: 78
End: 2023-07-31
Payer: MEDICARE

## 2023-07-31 VITALS
SYSTOLIC BLOOD PRESSURE: 170 MMHG | OXYGEN SATURATION: 96 % | HEIGHT: 65 IN | DIASTOLIC BLOOD PRESSURE: 85 MMHG | HEART RATE: 82 BPM | WEIGHT: 160 LBS | BODY MASS INDEX: 26.66 KG/M2 | TEMPERATURE: 97.8 F

## 2023-07-31 LAB — GLUCOSE BLDC GLUCOMTR-MCNC: 216

## 2023-07-31 PROCEDURE — 82962 GLUCOSE BLOOD TEST: CPT

## 2023-07-31 PROCEDURE — 99214 OFFICE O/P EST MOD 30 MIN: CPT | Mod: 25

## 2023-08-01 NOTE — PROCEDURE
[FreeTextEntry1] : General: In no acute distress.\par  \par  Head: Normocephalic\par  \par  Skin: Normal, no bruises. There are no cushingoid features\par  \par  Eyes: No pallor, lid lag or orbitopathy.\par  \par  Foot exam: No ulcers, no onychomycotic nail changes. Good pedal pulses. 10-g [place]-Fay monofilament testing is normal bilaterally. Vibratory sensation with 128-Hz tuning fork is preserved bilaterally.\par

## 2023-08-01 NOTE — HISTORY OF PRESENT ILLNESS
[FreeTextEntry1] : ***July 31, 2023*** Ms Barber arrives today for evaluation of glycemic control.  She feels well overall and denies any new complaints.  She reports that she has not had janumet x 3 weeks and only takes Repaglinide 1mg once a day.    She brought in a Alice 2 for application. FS in office today is 216 mg/dL one hour after eating breakfast.    ***JUNE 26, 2023*** Ms. BARBER  arrives today for follow up evaluation of glycemic control.   She is presently on Janumet  mg BID, Repaglinide 1mg TID with meals and vitamin D3 1000 u qd, She is wearing a Alice 2 cgm.  She reports 100% compliance with Janumet and reports missing Repaglinide a few times per week.  SHe is wearing a ALICE 2 cgm with poor scanning compliance.   Hypoglycemia frequency: Pt denies subjective HYPOs Fingerstick glucose in the office today is  165 mg/dL 2 hours after eating lunch with cake. Diet: Following CDE/RD carbohydrate recommendations.   Exercise: no Lab review: a1c- Previously 11.1%, POCT A1C in office today is 8.3% ALICE 2 Date of download:  06/26/2023 Diabetes Medications and Dosage: as above Indication for CGMS: verify a change in the treatment regimen, identify periods of hypoglycemia/ hyperglycemia. Modal day report: pattern. Pt with HYPO  5% of the time ( NONE below 54),  86% in target range Hyperglycemia:  9% elevation Identified issues: carbohydrate counting dates analyzed: 06/13/2023 - 06/26/2023 TIME CGM active 25%

## 2023-08-01 NOTE — ASSESSMENT
[FreeTextEntry1] : Uncontrolled T2DM compliance remains an issue and is plagued by a lack of understanding.  We spent most of the visit reviewing the importance of medication adherence as prescribed, risks and benefits and mechanism of action of medications, consequences of prolonged Hyperglycemia.  I applied a SUNITHA 2 cgm and secured it with two simpatches.  I ordered Janumet to [facility] at Pt's request (less expensive) I wrote out the following instructions for her: "Repaglinide 1mg one tablet three times a day before meals or high carb snack like ice cream, cake.   Janumet 50-1000mg one tablet BID. Scan Sunitha 2 cgm at least 5 times a day: wake up, with each meal, and at bedtime.    Please call the office right away if you have any trouble following these recommendations."  RTC 3 weeks

## 2023-08-14 ENCOUNTER — APPOINTMENT (OUTPATIENT)
Dept: ENDOCRINOLOGY | Facility: CLINIC | Age: 78
End: 2023-08-14
Payer: MEDICARE

## 2023-08-14 VITALS
HEIGHT: 65 IN | BODY MASS INDEX: 26.33 KG/M2 | SYSTOLIC BLOOD PRESSURE: 165 MMHG | WEIGHT: 158 LBS | DIASTOLIC BLOOD PRESSURE: 85 MMHG | OXYGEN SATURATION: 98 % | HEART RATE: 85 BPM

## 2023-08-14 PROCEDURE — 99214 OFFICE O/P EST MOD 30 MIN: CPT | Mod: 25

## 2023-08-14 PROCEDURE — 82962 GLUCOSE BLOOD TEST: CPT

## 2023-08-14 PROCEDURE — 95251 CONT GLUC MNTR ANALYSIS I&R: CPT

## 2023-08-15 NOTE — ASSESSMENT
[FreeTextEntry1] : Uncontrolled T2DM -Compliance reiterated.  Reviewed medication regimen. Glycemic control improved greatly over the past two weeks.   -Continue Alice 2 and scan 5 or more times per day -Continue Janumet  1 tablet BID -Continue Repaglinide 1mg 1 tablet TID a day before meals or high carb snack like ice cream, cake.   -Blood work  HLD, LDL elevated - Begin Rosuvastatin 10mg qhs - Continue to monitor   Please call the office right away if you have any trouble following these recommendations."  RTC 3 weeks

## 2023-08-15 NOTE — HISTORY OF PRESENT ILLNESS
[FreeTextEntry1] : ***Aug 14, 2023*** Inna feels well overall She is presently on Janumet  mg BID, Repaglinide 1mg TID with meals and vitamin D3 1000 u qd, She is wearing a Alice 2 cgm.  She denies subjective HYPOs Date of download:  08/15/2023 Diabetes Medications and Dosage: as above Indication for CGMS: verify a change in the treatment regimen, identify periods of hypoglycemia/ hyperglycemia. Modal day report: pattern. Pt with HYPO  2% of the time ( NONE below 54),  94% in target range Hyperglycemia:  4% elevation Identified issues: medication compliance, carbohydrate counting dates analyzed:  08/02/2023- 08/15/2023  ***July 31, 2023*** Ms Barber arrives today for evaluation of glycemic control.  She feels well overall and denies any new complaints.  She reports that she has not had janumet x 3 weeks and only takes Repaglinide 1mg once a day.    She brought in a Alice 2 for application. FS in office today is 216 mg/dL one hour after eating breakfast.   Her BG in office is 141 mg/dL 1 hour after eating.   She denies HYPO events    Date of download:  08/14/2023 Diabetes Medications and Dosage: as above Indication for CGMS: verify a change in the treatment regimen, identify periods of hypoglycemia/ hyperglycemia. Modal day report: pattern. Pt with HYPO  % of the time ( NONE below 54),  % in target range Hyperglycemia:  % elevation Identified issues: carbohydrate counting dates analyzed:   - 08/14/2023  ***JUNE 26, 2023*** Ms. BARBER  arrives today for follow up evaluation of glycemic control.   She is presently on Janumet  mg BID, Repaglinide 1mg TID with meals and vitamin D3 1000 u qd, She is wearing a Alice 2 cgm.  She reports 100% compliance with Janumet and reports missing Repaglinide a few times per week.  SHe is wearing a ALICE 2 cgm with poor scanning compliance.   Hypoglycemia frequency: Pt denies subjective HYPOs Fingerstick glucose in the office today is  165 mg/dL 2 hours after eating lunch with cake. Diet: Following CDE/RD carbohydrate recommendations.   Exercise: no Lab review: a1c- Previously 11.1%, POCT A1C in office today is 8.3% ALICE 2 Date of download:  06/26/2023 Diabetes Medications and Dosage: as above Indication for CGMS: verify a change in the treatment regimen, identify periods of hypoglycemia/ hyperglycemia. Modal day report: pattern. Pt with HYPO  5% of the time ( NONE below 54),  86% in target range Hyperglycemia:  9% elevation Identified issues: carbohydrate counting dates analyzed: 06/13/2023 - 06/26/2023 TIME CGM active 25%

## 2023-08-15 NOTE — PROCEDURE
[FreeTextEntry1] : General: In no acute distress.\par  \par  Head: Normocephalic\par  \par  Skin: Normal, no bruises. There are no cushingoid features\par  \par  Eyes: No pallor, lid lag or orbitopathy.\par  \par  Foot exam: No ulcers, no onychomycotic nail changes. Good pedal pulses. 10-g [place]-Fay monofilament testing is normal bilaterally. Vibratory sensation with 128-Hz tuning fork is preserved bilaterally.\par

## 2023-08-16 LAB
25(OH)D3 SERPL-MCNC: 25.7 NG/ML
ALBUMIN SERPL ELPH-MCNC: 4.8 G/DL
ALP BLD-CCNC: 67 U/L
ALT SERPL-CCNC: 14 U/L
ANION GAP SERPL CALC-SCNC: 13 MMOL/L
AST SERPL-CCNC: 16 U/L
BASOPHILS # BLD AUTO: 0.06 K/UL
BASOPHILS NFR BLD AUTO: 0.7 %
BILIRUB SERPL-MCNC: 0.3 MG/DL
BUN SERPL-MCNC: 17 MG/DL
CALCIUM SERPL-MCNC: 11.1 MG/DL
CHLORIDE SERPL-SCNC: 106 MMOL/L
CHOLEST SERPL-MCNC: 276 MG/DL
CO2 SERPL-SCNC: 23 MMOL/L
CREAT SERPL-MCNC: 0.88 MG/DL
EGFR: 68 ML/MIN/1.73M2
EOSINOPHIL # BLD AUTO: 0.14 K/UL
EOSINOPHIL NFR BLD AUTO: 1.6 %
ESTIMATED AVERAGE GLUCOSE: 160 MG/DL
FOLATE SERPL-MCNC: 16.1 NG/ML
FRUCTOSAMINE SERPL-MCNC: 327 UMOL/L
GLUCOSE BLDC GLUCOMTR-MCNC: 141
GLUCOSE SERPL-MCNC: 89 MG/DL
GLYCOMARK.: 9.6 UG/ML
HBA1C MFR BLD HPLC: 7.2 %
HCT VFR BLD CALC: 39.7 %
HDLC SERPL-MCNC: 57 MG/DL
HGB BLD-MCNC: 12.4 G/DL
IMM GRANULOCYTES NFR BLD AUTO: 0.2 %
LDLC SERPL CALC-MCNC: 191 MG/DL
LYMPHOCYTES # BLD AUTO: 3.26 K/UL
LYMPHOCYTES NFR BLD AUTO: 37.3 %
MAN DIFF?: NORMAL
MCHC RBC-ENTMCNC: 29.2 PG
MCHC RBC-ENTMCNC: 31.2 GM/DL
MCV RBC AUTO: 93.6 FL
MONOCYTES # BLD AUTO: 0.59 K/UL
MONOCYTES NFR BLD AUTO: 6.8 %
NEUTROPHILS # BLD AUTO: 4.66 K/UL
NEUTROPHILS NFR BLD AUTO: 53.4 %
NONHDLC SERPL-MCNC: 219 MG/DL
PLATELET # BLD AUTO: 206 K/UL
POTASSIUM SERPL-SCNC: 4.2 MMOL/L
PROT SERPL-MCNC: 7.7 G/DL
RBC # BLD: 4.24 M/UL
RBC # FLD: 14.8 %
SODIUM SERPL-SCNC: 142 MMOL/L
T4 FREE SERPL-MCNC: 1.2 NG/DL
TRIGL SERPL-MCNC: 152 MG/DL
TSH SERPL-ACNC: 1.3 UIU/ML
VIT B12 SERPL-MCNC: 369 PG/ML
WBC # FLD AUTO: 8.73 K/UL

## 2023-09-05 ENCOUNTER — APPOINTMENT (OUTPATIENT)
Dept: ENDOCRINOLOGY | Facility: CLINIC | Age: 78
End: 2023-09-05
Payer: MEDICARE

## 2023-09-05 VITALS
HEIGHT: 65 IN | DIASTOLIC BLOOD PRESSURE: 75 MMHG | BODY MASS INDEX: 26.23 KG/M2 | WEIGHT: 157.44 LBS | HEART RATE: 94 BPM | OXYGEN SATURATION: 97 % | TEMPERATURE: 98.2 F | SYSTOLIC BLOOD PRESSURE: 138 MMHG

## 2023-09-05 LAB — GLUCOSE BLDC GLUCOMTR-MCNC: 200

## 2023-09-05 PROCEDURE — 82962 GLUCOSE BLOOD TEST: CPT

## 2023-09-05 PROCEDURE — 95251 CONT GLUC MNTR ANALYSIS I&R: CPT

## 2023-09-05 PROCEDURE — 99214 OFFICE O/P EST MOD 30 MIN: CPT | Mod: 25

## 2023-09-05 RX ORDER — EMPAGLIFLOZIN 10 MG/1
10 TABLET, FILM COATED ORAL DAILY
Qty: 30 | Refills: 11 | Status: DISCONTINUED | COMMUNITY
Start: 2019-04-23 | End: 2023-09-05

## 2023-09-05 RX ORDER — METFORMIN HYDROCHLORIDE 1000 MG/1
1000 TABLET, COATED ORAL TWICE DAILY
Qty: 180 | Refills: 0 | Status: DISCONTINUED | COMMUNITY
Start: 2019-01-21 | End: 2023-09-05

## 2023-09-05 NOTE — HISTORY OF PRESENT ILLNESS
[FreeTextEntry1] : ***September 5 ,2023*** Inna arrives today for evaluation of glycemic control.  Janumet 50-1000mg BID, Prandin 1mg TID premeal Inna reports often forgetting her lunch dose and sometimes taking it after she eats lunch.  She is wearing a freestyle SUNITHA 2.   She reports subjective HYPOs approximately 1x week, feels sweaty and nauseous, reports BG in 50s.   Her BG in office today is 200 mg/dL 1 hour after eating. Date of download:  09/05/2023 Diabetes Medications and Dosage: as above Indication for CGMS: verify a change in the treatment regimen, identify periods of hypoglycemia/ hyperglycemia. Modal day report: pattern. Pt with HYPO  3% of the time ( NONE below 54),  90% in target range Hyperglycemia:  7% elevation Identified issues: carbohydrate counting dates analyzed:  08/23 08/23/2023 - 09/05/2023  ***July 31, 2023*** Ms Barber arrives today for evaluation of glycemic control.  She feels well overall and denies any new complaints.  She reports that she has not had janumet x 3 weeks and only takes Repaglinide 1mg once a day.    She brought in a Sunitha 2 for application. FS in office today is 216 mg/dL one hour after eating breakfast.    ***JUNE 26, 2023*** Ms. BARBER  arrives today for follow up evaluation of glycemic control.   She is presently on Janumet  mg BID, Repaglinide 1mg TID with meals and vitamin D3 1000 u qd, She is wearing a Sunitha 2 cgm.  She reports 100% compliance with Janumet and reports missing Repaglinide a few times per week.  SHe is wearing a SUNITHA 2 cgm with poor scanning compliance.   Hypoglycemia frequency: Pt denies subjective HYPOs Fingerstick glucose in the office today is  165 mg/dL 2 hours after eating lunch with cake. Diet: Following CDE/RD carbohydrate recommendations.   Exercise: no Lab review: a1c- Previously 11.1%, POCT A1C in office today is 8.3% SUNITHA 2 Date of download:  06/26/2023 Diabetes Medications and Dosage: as above Indication for CGMS: verify a change in the treatment regimen, identify periods of hypoglycemia/ hyperglycemia. Modal day report: pattern. Pt with HYPO  5% of the time ( NONE below 54),  86% in target range Hyperglycemia:  9% elevation Identified issues: carbohydrate counting dates analyzed: 06/13/2023 - 06/26/2023 TIME CGM active 25%

## 2023-09-05 NOTE — PROCEDURE
[FreeTextEntry1] : General: In no acute distress.\par  \par  Head: Normocephalic\par  \par  Skin: Normal, no bruises. There are no cushingoid features\par  \par  Eyes: No pallor, lid lag or orbitopathy.\par  \par  Foot exam: No ulcers, no onychomycotic nail changes. Good pedal pulses. 10-g [place]-Fay monofilament testing is normal bilaterally. Vibratory sensation with 128-Hz tuning fork is preserved bilaterally.\par

## 2023-09-05 NOTE — ASSESSMENT
[FreeTextEntry1] : T2DM under reasonable control, correcting HYPOs takes priority -Reduce Prandin 1 mg one tablet BID before breakfast and dinner.   -Continue Janumet 50-1000mg one tablet BID.  Scan Alice 2 cgm at least 5 times a day: wake up, with each meal, and at bedtime.     RTC 1 month with Dr. Marina.

## 2023-09-28 ENCOUNTER — APPOINTMENT (OUTPATIENT)
Dept: ENDOCRINOLOGY | Facility: CLINIC | Age: 78
End: 2023-09-28
Payer: MEDICARE

## 2023-09-28 VITALS
RESPIRATION RATE: 16 BRPM | OXYGEN SATURATION: 98 % | SYSTOLIC BLOOD PRESSURE: 178 MMHG | DIASTOLIC BLOOD PRESSURE: 70 MMHG | BODY MASS INDEX: 26.16 KG/M2 | WEIGHT: 157 LBS | HEART RATE: 72 BPM | HEIGHT: 65 IN

## 2023-09-28 LAB — GLUCOSE BLDC GLUCOMTR-MCNC: 180

## 2023-09-28 PROCEDURE — 82962 GLUCOSE BLOOD TEST: CPT

## 2023-09-28 PROCEDURE — 99214 OFFICE O/P EST MOD 30 MIN: CPT | Mod: 25

## 2023-09-28 RX ORDER — SAXAGLIPTIN 5 MG/1
5 TABLET, FILM COATED ORAL DAILY
Qty: 30 | Refills: 6 | Status: DISCONTINUED | COMMUNITY
Start: 2019-04-23 | End: 2023-09-28

## 2023-09-28 RX ORDER — LINAGLIPTIN 5 MG/1
5 TABLET, FILM COATED ORAL
Qty: 30 | Refills: 6 | Status: DISCONTINUED | COMMUNITY
Start: 2019-07-19 | End: 2023-09-28

## 2023-09-28 RX ORDER — ATORVASTATIN CALCIUM 20 MG/1
20 TABLET, FILM COATED ORAL
Qty: 90 | Refills: 0 | Status: DISCONTINUED | COMMUNITY
Start: 2019-04-23 | End: 2023-09-28

## 2023-09-28 RX ORDER — ROSUVASTATIN CALCIUM 20 MG/1
20 TABLET, FILM COATED ORAL
Qty: 90 | Refills: 2 | Status: ACTIVE | COMMUNITY
Start: 2023-08-15 | End: 1900-01-01

## 2023-10-04 ENCOUNTER — NON-APPOINTMENT (OUTPATIENT)
Age: 78
End: 2023-10-04

## 2023-10-25 ENCOUNTER — RX RENEWAL (OUTPATIENT)
Age: 78
End: 2023-10-25

## 2023-11-02 ENCOUNTER — APPOINTMENT (OUTPATIENT)
Dept: ENDOCRINOLOGY | Facility: CLINIC | Age: 78
End: 2023-11-02
Payer: MEDICARE

## 2023-11-02 VITALS
HEART RATE: 76 BPM | HEIGHT: 65 IN | RESPIRATION RATE: 17 BRPM | SYSTOLIC BLOOD PRESSURE: 154 MMHG | BODY MASS INDEX: 27.16 KG/M2 | OXYGEN SATURATION: 97 % | WEIGHT: 163 LBS | TEMPERATURE: 98.2 F | DIASTOLIC BLOOD PRESSURE: 112 MMHG

## 2023-11-02 LAB — GLUCOSE BLDC GLUCOMTR-MCNC: 118

## 2023-11-02 PROCEDURE — 82962 GLUCOSE BLOOD TEST: CPT

## 2023-11-02 PROCEDURE — 99214 OFFICE O/P EST MOD 30 MIN: CPT | Mod: 25

## 2023-11-03 ENCOUNTER — NON-APPOINTMENT (OUTPATIENT)
Age: 78
End: 2023-11-03

## 2023-11-03 VITALS — HEART RATE: 80 BPM | RESPIRATION RATE: 14 BRPM | SYSTOLIC BLOOD PRESSURE: 147 MMHG | DIASTOLIC BLOOD PRESSURE: 83 MMHG

## 2023-11-03 VITALS — DIASTOLIC BLOOD PRESSURE: 70 MMHG | SYSTOLIC BLOOD PRESSURE: 142 MMHG

## 2023-11-03 RX ORDER — LISINOPRIL 20 MG/1
20 TABLET ORAL DAILY
Qty: 90 | Refills: 3 | Status: ACTIVE | COMMUNITY
Start: 2018-02-26 | End: 1900-01-01

## 2023-12-08 ENCOUNTER — INPATIENT (INPATIENT)
Facility: HOSPITAL | Age: 78
LOS: 4 days | Discharge: HOME | End: 2023-12-13
Attending: HOSPITALIST | Admitting: HOSPITALIST
Payer: MEDICARE

## 2023-12-08 VITALS
DIASTOLIC BLOOD PRESSURE: 79 MMHG | HEART RATE: 88 BPM | RESPIRATION RATE: 18 BRPM | TEMPERATURE: 99 F | OXYGEN SATURATION: 96 % | WEIGHT: 149.91 LBS | SYSTOLIC BLOOD PRESSURE: 158 MMHG | HEIGHT: 65 IN

## 2023-12-08 LAB
ALBUMIN SERPL ELPH-MCNC: 3.6 G/DL — SIGNIFICANT CHANGE UP (ref 3.3–5)
ALBUMIN SERPL ELPH-MCNC: 3.6 G/DL — SIGNIFICANT CHANGE UP (ref 3.3–5)
ALP SERPL-CCNC: 65 U/L — SIGNIFICANT CHANGE UP (ref 40–120)
ALP SERPL-CCNC: 65 U/L — SIGNIFICANT CHANGE UP (ref 40–120)
ALT FLD-CCNC: 17 U/L — SIGNIFICANT CHANGE UP (ref 12–78)
ALT FLD-CCNC: 17 U/L — SIGNIFICANT CHANGE UP (ref 12–78)
ANION GAP SERPL CALC-SCNC: 4 MMOL/L — LOW (ref 5–17)
ANION GAP SERPL CALC-SCNC: 4 MMOL/L — LOW (ref 5–17)
APPEARANCE UR: CLEAR — SIGNIFICANT CHANGE UP
APPEARANCE UR: CLEAR — SIGNIFICANT CHANGE UP
AST SERPL-CCNC: 13 U/L — LOW (ref 15–37)
AST SERPL-CCNC: 13 U/L — LOW (ref 15–37)
BACTERIA # UR AUTO: ABNORMAL /HPF
BACTERIA # UR AUTO: ABNORMAL /HPF
BASOPHILS # BLD AUTO: 0.06 K/UL — SIGNIFICANT CHANGE UP (ref 0–0.2)
BASOPHILS # BLD AUTO: 0.06 K/UL — SIGNIFICANT CHANGE UP (ref 0–0.2)
BASOPHILS NFR BLD AUTO: 0.6 % — SIGNIFICANT CHANGE UP (ref 0–2)
BASOPHILS NFR BLD AUTO: 0.6 % — SIGNIFICANT CHANGE UP (ref 0–2)
BILIRUB SERPL-MCNC: 0.4 MG/DL — SIGNIFICANT CHANGE UP (ref 0.2–1.2)
BILIRUB SERPL-MCNC: 0.4 MG/DL — SIGNIFICANT CHANGE UP (ref 0.2–1.2)
BILIRUB UR-MCNC: NEGATIVE — SIGNIFICANT CHANGE UP
BILIRUB UR-MCNC: NEGATIVE — SIGNIFICANT CHANGE UP
BUN SERPL-MCNC: 27 MG/DL — HIGH (ref 7–23)
BUN SERPL-MCNC: 27 MG/DL — HIGH (ref 7–23)
CALCIUM SERPL-MCNC: 10.5 MG/DL — HIGH (ref 8.5–10.1)
CALCIUM SERPL-MCNC: 10.5 MG/DL — HIGH (ref 8.5–10.1)
CHLORIDE SERPL-SCNC: 109 MMOL/L — HIGH (ref 96–108)
CHLORIDE SERPL-SCNC: 109 MMOL/L — HIGH (ref 96–108)
CO2 SERPL-SCNC: 27 MMOL/L — SIGNIFICANT CHANGE UP (ref 22–31)
CO2 SERPL-SCNC: 27 MMOL/L — SIGNIFICANT CHANGE UP (ref 22–31)
COLOR SPEC: YELLOW — SIGNIFICANT CHANGE UP
COLOR SPEC: YELLOW — SIGNIFICANT CHANGE UP
CREAT SERPL-MCNC: 1.16 MG/DL — SIGNIFICANT CHANGE UP (ref 0.5–1.3)
CREAT SERPL-MCNC: 1.16 MG/DL — SIGNIFICANT CHANGE UP (ref 0.5–1.3)
DIFF PNL FLD: NEGATIVE — SIGNIFICANT CHANGE UP
DIFF PNL FLD: NEGATIVE — SIGNIFICANT CHANGE UP
EGFR: 49 ML/MIN/1.73M2 — LOW
EGFR: 49 ML/MIN/1.73M2 — LOW
EOSINOPHIL # BLD AUTO: 0.11 K/UL — SIGNIFICANT CHANGE UP (ref 0–0.5)
EOSINOPHIL # BLD AUTO: 0.11 K/UL — SIGNIFICANT CHANGE UP (ref 0–0.5)
EOSINOPHIL NFR BLD AUTO: 1.1 % — SIGNIFICANT CHANGE UP (ref 0–6)
EOSINOPHIL NFR BLD AUTO: 1.1 % — SIGNIFICANT CHANGE UP (ref 0–6)
EPI CELLS # UR: PRESENT
EPI CELLS # UR: PRESENT
FLUAV AG NPH QL: SIGNIFICANT CHANGE UP
FLUAV AG NPH QL: SIGNIFICANT CHANGE UP
FLUBV AG NPH QL: SIGNIFICANT CHANGE UP
FLUBV AG NPH QL: SIGNIFICANT CHANGE UP
GLUCOSE BLDC GLUCOMTR-MCNC: 115 MG/DL — HIGH (ref 70–99)
GLUCOSE BLDC GLUCOMTR-MCNC: 115 MG/DL — HIGH (ref 70–99)
GLUCOSE SERPL-MCNC: 110 MG/DL — HIGH (ref 70–99)
GLUCOSE SERPL-MCNC: 110 MG/DL — HIGH (ref 70–99)
GLUCOSE UR QL: NEGATIVE MG/DL — SIGNIFICANT CHANGE UP
GLUCOSE UR QL: NEGATIVE MG/DL — SIGNIFICANT CHANGE UP
HCT VFR BLD CALC: 37.2 % — SIGNIFICANT CHANGE UP (ref 34.5–45)
HCT VFR BLD CALC: 37.2 % — SIGNIFICANT CHANGE UP (ref 34.5–45)
HGB BLD-MCNC: 12.1 G/DL — SIGNIFICANT CHANGE UP (ref 11.5–15.5)
HGB BLD-MCNC: 12.1 G/DL — SIGNIFICANT CHANGE UP (ref 11.5–15.5)
IMM GRANULOCYTES NFR BLD AUTO: 0.3 % — SIGNIFICANT CHANGE UP (ref 0–0.9)
IMM GRANULOCYTES NFR BLD AUTO: 0.3 % — SIGNIFICANT CHANGE UP (ref 0–0.9)
KETONES UR-MCNC: NEGATIVE MG/DL — SIGNIFICANT CHANGE UP
KETONES UR-MCNC: NEGATIVE MG/DL — SIGNIFICANT CHANGE UP
LEUKOCYTE ESTERASE UR-ACNC: NEGATIVE — SIGNIFICANT CHANGE UP
LEUKOCYTE ESTERASE UR-ACNC: NEGATIVE — SIGNIFICANT CHANGE UP
LYMPHOCYTES # BLD AUTO: 3.57 K/UL — HIGH (ref 1–3.3)
LYMPHOCYTES # BLD AUTO: 3.57 K/UL — HIGH (ref 1–3.3)
LYMPHOCYTES # BLD AUTO: 36.7 % — SIGNIFICANT CHANGE UP (ref 13–44)
LYMPHOCYTES # BLD AUTO: 36.7 % — SIGNIFICANT CHANGE UP (ref 13–44)
MAGNESIUM SERPL-MCNC: 1.9 MG/DL — SIGNIFICANT CHANGE UP (ref 1.6–2.6)
MAGNESIUM SERPL-MCNC: 1.9 MG/DL — SIGNIFICANT CHANGE UP (ref 1.6–2.6)
MCHC RBC-ENTMCNC: 28.5 PG — SIGNIFICANT CHANGE UP (ref 27–34)
MCHC RBC-ENTMCNC: 28.5 PG — SIGNIFICANT CHANGE UP (ref 27–34)
MCHC RBC-ENTMCNC: 32.5 G/DL — SIGNIFICANT CHANGE UP (ref 32–36)
MCHC RBC-ENTMCNC: 32.5 G/DL — SIGNIFICANT CHANGE UP (ref 32–36)
MCV RBC AUTO: 87.5 FL — SIGNIFICANT CHANGE UP (ref 80–100)
MCV RBC AUTO: 87.5 FL — SIGNIFICANT CHANGE UP (ref 80–100)
MONOCYTES # BLD AUTO: 0.75 K/UL — SIGNIFICANT CHANGE UP (ref 0–0.9)
MONOCYTES # BLD AUTO: 0.75 K/UL — SIGNIFICANT CHANGE UP (ref 0–0.9)
MONOCYTES NFR BLD AUTO: 7.7 % — SIGNIFICANT CHANGE UP (ref 2–14)
MONOCYTES NFR BLD AUTO: 7.7 % — SIGNIFICANT CHANGE UP (ref 2–14)
NEUTROPHILS # BLD AUTO: 5.22 K/UL — SIGNIFICANT CHANGE UP (ref 1.8–7.4)
NEUTROPHILS # BLD AUTO: 5.22 K/UL — SIGNIFICANT CHANGE UP (ref 1.8–7.4)
NEUTROPHILS NFR BLD AUTO: 53.6 % — SIGNIFICANT CHANGE UP (ref 43–77)
NEUTROPHILS NFR BLD AUTO: 53.6 % — SIGNIFICANT CHANGE UP (ref 43–77)
NITRITE UR-MCNC: NEGATIVE — SIGNIFICANT CHANGE UP
NITRITE UR-MCNC: NEGATIVE — SIGNIFICANT CHANGE UP
NRBC # BLD: 0 /100 WBCS — SIGNIFICANT CHANGE UP (ref 0–0)
NRBC # BLD: 0 /100 WBCS — SIGNIFICANT CHANGE UP (ref 0–0)
PH UR: 5.5 — SIGNIFICANT CHANGE UP (ref 5–8)
PH UR: 5.5 — SIGNIFICANT CHANGE UP (ref 5–8)
PLATELET # BLD AUTO: 181 K/UL — SIGNIFICANT CHANGE UP (ref 150–400)
PLATELET # BLD AUTO: 181 K/UL — SIGNIFICANT CHANGE UP (ref 150–400)
POTASSIUM SERPL-MCNC: 4.1 MMOL/L — SIGNIFICANT CHANGE UP (ref 3.5–5.3)
POTASSIUM SERPL-MCNC: 4.1 MMOL/L — SIGNIFICANT CHANGE UP (ref 3.5–5.3)
POTASSIUM SERPL-SCNC: 4.1 MMOL/L — SIGNIFICANT CHANGE UP (ref 3.5–5.3)
POTASSIUM SERPL-SCNC: 4.1 MMOL/L — SIGNIFICANT CHANGE UP (ref 3.5–5.3)
PROT SERPL-MCNC: 8 GM/DL — SIGNIFICANT CHANGE UP (ref 6–8.3)
PROT SERPL-MCNC: 8 GM/DL — SIGNIFICANT CHANGE UP (ref 6–8.3)
PROT UR-MCNC: SIGNIFICANT CHANGE UP MG/DL
PROT UR-MCNC: SIGNIFICANT CHANGE UP MG/DL
RBC # BLD: 4.25 M/UL — SIGNIFICANT CHANGE UP (ref 3.8–5.2)
RBC # BLD: 4.25 M/UL — SIGNIFICANT CHANGE UP (ref 3.8–5.2)
RBC # FLD: 14.4 % — SIGNIFICANT CHANGE UP (ref 10.3–14.5)
RBC # FLD: 14.4 % — SIGNIFICANT CHANGE UP (ref 10.3–14.5)
RBC CASTS # UR COMP ASSIST: SIGNIFICANT CHANGE UP /HPF
RBC CASTS # UR COMP ASSIST: SIGNIFICANT CHANGE UP /HPF
SARS-COV-2 RNA SPEC QL NAA+PROBE: SIGNIFICANT CHANGE UP
SARS-COV-2 RNA SPEC QL NAA+PROBE: SIGNIFICANT CHANGE UP
SODIUM SERPL-SCNC: 140 MMOL/L — SIGNIFICANT CHANGE UP (ref 135–145)
SODIUM SERPL-SCNC: 140 MMOL/L — SIGNIFICANT CHANGE UP (ref 135–145)
SP GR SPEC: 1.02 — SIGNIFICANT CHANGE UP (ref 1–1.03)
SP GR SPEC: 1.02 — SIGNIFICANT CHANGE UP (ref 1–1.03)
TROPONIN I, HIGH SENSITIVITY RESULT: 5 NG/L — SIGNIFICANT CHANGE UP
TROPONIN I, HIGH SENSITIVITY RESULT: 5 NG/L — SIGNIFICANT CHANGE UP
TSH SERPL-MCNC: 1.52 UU/ML — SIGNIFICANT CHANGE UP (ref 0.36–3.74)
TSH SERPL-MCNC: 1.52 UU/ML — SIGNIFICANT CHANGE UP (ref 0.36–3.74)
UROBILINOGEN FLD QL: 0.2 MG/DL — SIGNIFICANT CHANGE UP (ref 0.2–1)
UROBILINOGEN FLD QL: 0.2 MG/DL — SIGNIFICANT CHANGE UP (ref 0.2–1)
WBC # BLD: 9.74 K/UL — SIGNIFICANT CHANGE UP (ref 3.8–10.5)
WBC # BLD: 9.74 K/UL — SIGNIFICANT CHANGE UP (ref 3.8–10.5)
WBC # FLD AUTO: 9.74 K/UL — SIGNIFICANT CHANGE UP (ref 3.8–10.5)
WBC # FLD AUTO: 9.74 K/UL — SIGNIFICANT CHANGE UP (ref 3.8–10.5)
WBC UR QL: SIGNIFICANT CHANGE UP /HPF (ref 0–5)
WBC UR QL: SIGNIFICANT CHANGE UP /HPF (ref 0–5)

## 2023-12-08 PROCEDURE — 70450 CT HEAD/BRAIN W/O DYE: CPT | Mod: 26,MA,59

## 2023-12-08 PROCEDURE — 93010 ELECTROCARDIOGRAM REPORT: CPT

## 2023-12-08 PROCEDURE — 99222 1ST HOSP IP/OBS MODERATE 55: CPT

## 2023-12-08 PROCEDURE — 70498 CT ANGIOGRAPHY NECK: CPT | Mod: 26

## 2023-12-08 PROCEDURE — 70496 CT ANGIOGRAPHY HEAD: CPT | Mod: 26

## 2023-12-08 PROCEDURE — 99285 EMERGENCY DEPT VISIT HI MDM: CPT

## 2023-12-08 RX ORDER — GABAPENTIN 400 MG/1
0 CAPSULE ORAL
Refills: 0 | DISCHARGE

## 2023-12-08 RX ORDER — DEXTROSE 50 % IN WATER 50 %
15 SYRINGE (ML) INTRAVENOUS ONCE
Refills: 0 | Status: DISCONTINUED | OUTPATIENT
Start: 2023-12-08 | End: 2023-12-13

## 2023-12-08 RX ORDER — LISINOPRIL 2.5 MG/1
20 TABLET ORAL DAILY
Refills: 0 | Status: DISCONTINUED | OUTPATIENT
Start: 2023-12-08 | End: 2023-12-08

## 2023-12-08 RX ORDER — ROSUVASTATIN CALCIUM 5 MG/1
1 TABLET ORAL
Refills: 0 | DISCHARGE

## 2023-12-08 RX ORDER — SITAGLIPTIN AND METFORMIN HYDROCHLORIDE 500; 50 MG/1; MG/1
1 TABLET, FILM COATED ORAL
Refills: 0 | DISCHARGE

## 2023-12-08 RX ORDER — ATORVASTATIN CALCIUM 80 MG/1
80 TABLET, FILM COATED ORAL AT BEDTIME
Refills: 0 | Status: DISCONTINUED | OUTPATIENT
Start: 2023-12-08 | End: 2023-12-13

## 2023-12-08 RX ORDER — SODIUM CHLORIDE 9 MG/ML
1000 INJECTION, SOLUTION INTRAVENOUS
Refills: 0 | Status: DISCONTINUED | OUTPATIENT
Start: 2023-12-08 | End: 2023-12-13

## 2023-12-08 RX ORDER — INFLUENZA VIRUS VACCINE 15; 15; 15; 15 UG/.5ML; UG/.5ML; UG/.5ML; UG/.5ML
0.7 SUSPENSION INTRAMUSCULAR ONCE
Refills: 0 | Status: DISCONTINUED | OUTPATIENT
Start: 2023-12-08 | End: 2023-12-13

## 2023-12-08 RX ORDER — LIDOCAINE 4 G/100G
1 CREAM TOPICAL ONCE
Refills: 0 | Status: COMPLETED | OUTPATIENT
Start: 2023-12-08 | End: 2023-12-08

## 2023-12-08 RX ORDER — DEXTROSE 50 % IN WATER 50 %
25 SYRINGE (ML) INTRAVENOUS ONCE
Refills: 0 | Status: DISCONTINUED | OUTPATIENT
Start: 2023-12-08 | End: 2023-12-13

## 2023-12-08 RX ORDER — INSULIN LISPRO 100/ML
VIAL (ML) SUBCUTANEOUS
Refills: 0 | Status: DISCONTINUED | OUTPATIENT
Start: 2023-12-08 | End: 2023-12-13

## 2023-12-08 RX ORDER — GLUCAGON INJECTION, SOLUTION 0.5 MG/.1ML
1 INJECTION, SOLUTION SUBCUTANEOUS ONCE
Refills: 0 | Status: DISCONTINUED | OUTPATIENT
Start: 2023-12-08 | End: 2023-12-13

## 2023-12-08 RX ORDER — SODIUM CHLORIDE 9 MG/ML
1000 INJECTION INTRAMUSCULAR; INTRAVENOUS; SUBCUTANEOUS ONCE
Refills: 0 | Status: COMPLETED | OUTPATIENT
Start: 2023-12-08 | End: 2023-12-08

## 2023-12-08 RX ORDER — LISINOPRIL 2.5 MG/1
20 TABLET ORAL DAILY
Refills: 0 | Status: DISCONTINUED | OUTPATIENT
Start: 2023-12-08 | End: 2023-12-11

## 2023-12-08 RX ORDER — HEPARIN SODIUM 5000 [USP'U]/ML
5000 INJECTION INTRAVENOUS; SUBCUTANEOUS EVERY 12 HOURS
Refills: 0 | Status: DISCONTINUED | OUTPATIENT
Start: 2023-12-08 | End: 2023-12-13

## 2023-12-08 RX ORDER — HYDRALAZINE HCL 50 MG
10 TABLET ORAL DAILY
Refills: 0 | Status: COMPLETED | OUTPATIENT
Start: 2023-12-08 | End: 2023-12-09

## 2023-12-08 RX ORDER — FERROUS FUMARATE 350(115)MG
1 TABLET ORAL
Refills: 0 | DISCHARGE

## 2023-12-08 RX ORDER — ASPIRIN/CALCIUM CARB/MAGNESIUM 324 MG
81 TABLET ORAL ONCE
Refills: 0 | Status: COMPLETED | OUTPATIENT
Start: 2023-12-08 | End: 2023-12-08

## 2023-12-08 RX ORDER — DEXTROSE 50 % IN WATER 50 %
12.5 SYRINGE (ML) INTRAVENOUS ONCE
Refills: 0 | Status: DISCONTINUED | OUTPATIENT
Start: 2023-12-08 | End: 2023-12-13

## 2023-12-08 RX ORDER — ASPIRIN/CALCIUM CARB/MAGNESIUM 324 MG
81 TABLET ORAL DAILY
Refills: 0 | Status: DISCONTINUED | OUTPATIENT
Start: 2023-12-09 | End: 2023-12-13

## 2023-12-08 RX ADMIN — ATORVASTATIN CALCIUM 80 MILLIGRAM(S): 80 TABLET, FILM COATED ORAL at 22:12

## 2023-12-08 RX ADMIN — LIDOCAINE 1 PATCH: 4 CREAM TOPICAL at 19:51

## 2023-12-08 RX ADMIN — Medication 81 MILLIGRAM(S): at 17:22

## 2023-12-08 RX ADMIN — LIDOCAINE 1 PATCH: 4 CREAM TOPICAL at 16:50

## 2023-12-08 RX ADMIN — SODIUM CHLORIDE 1000 MILLILITER(S): 9 INJECTION INTRAMUSCULAR; INTRAVENOUS; SUBCUTANEOUS at 15:14

## 2023-12-08 NOTE — ED ADULT NURSE NOTE - CHIEF COMPLAINT QUOTE
pt c/o neck pain, headache x 2 days, resolved with aspirin. family noticed patient having problems with short term memory, disoriented yesterday. LKN x 2 days. /103. A&O x 3, sleepy in triage. decreased sleep. hx: HTN, DM, high cholesterol.

## 2023-12-08 NOTE — ED ADULT NURSE NOTE - NSFALLUNIVINTERV_ED_ALL_ED
Bed/Stretcher in lowest position, wheels locked, appropriate side rails in place/Call bell, personal items and telephone in reach/Instruct patient to call for assistance before getting out of bed/chair/stretcher/Non-slip footwear applied when patient is off stretcher/[place] to call system/Physically safe environment - no spills, clutter or unnecessary equipment/Purposeful proactive rounding/Room/bathroom lighting operational, light cord in reach Bed/Stretcher in lowest position, wheels locked, appropriate side rails in place/Call bell, personal items and telephone in reach/Instruct patient to call for assistance before getting out of bed/chair/stretcher/Non-slip footwear applied when patient is off stretcher/[place] to call system/Physically safe environment - no spills, clutter or unnecessary equipment/Purposeful proactive rounding/Room/bathroom lighting operational, light cord in reach

## 2023-12-08 NOTE — ED PROVIDER NOTE - PHYSICAL EXAMINATION
Gen: aox3, nad   Head: NCAT  ENT: Airway patent, moist mucous membranes, nasal passageways clear, no pharyngeal erythema or exudates, uvula midline, no cervical lymphadenopathy  Cardiac: Normal rate, normal rhythm, no murmurs   Respiratory: Lungs CTA B/L  Gastrointestinal: Abdomen soft, nontender, nondistended, no rebound, no guarding  MSK: No gross abnormalities, FROM of all four extremities, no edema  HEME: Extremities warm, pulses intact and symmetrical in all four extremities  Skin: No rashes, no lesions  Neuro: No gross neurologic deficits, CN II-XII intact, no facial asymmetry, no dysarthria, no dysmetria, no drift, strength equal in all four extremities,

## 2023-12-08 NOTE — H&P ADULT - ASSESSMENT
78F      h/o  HTN/  HLD/  DM / anemia       presents with intermittent memory loss duration x 2 days,    hence brought in by daughter for evaluation.    pt is  very pleasant   and able  to  recite ,  books  of [employer],  in numerical  order     *  admitted  as  code  stroke/  neuro  called by  er  dr      acute  stroke , on imaging        Ct   head,  acute  stroke. R  frontal   infarct       pt is  alert.  ha s no  focal  deficits/ normal  speech        tele  monitoring      on asa/ lipitor      permissive  htn  for  initial  24  hours       MRI  head/  neuro  f/p       Echo ordered     *  HTN/ HLD    *  DM          follow   fs           home  meds.  janumet/ prandin   dvt ppx/  s/q  heparin   daughter   at bedside         rad< from: CT Head No Cont (12.08.23 @ 16:44) >  FINDINGS:  Acute/subacute right anterolateral frontal infarct. No CT evidence for   hemorrhagic transformation.  No hydrocephalus, midline shift, or effacement of the basal cisterns.  The visualized paranasal sinuses and mastoid air cells are clear.  IMPRESSION:  Acute/subacute right anterolateral frontal infarct.  No CT evidence for hemorrhagic transformation.  Dr. Massey discussed these findings with Dr. Austin on 12/8/2023 4:57 PM   with read back.  --- End of Report ---                78F      h/o  HTN/  HLD/  DM / anemia       presents with intermittent memory loss duration x 2 days,    hence brought in by daughter for evaluation.    pt is  very pleasant   and able  to  recite ,  books  of [employer],  in numerical  order     *  admitted  as  code  stroke/  neuro   dr grove, called by  er  dr      acute  stroke , on imaging        NIH score 0.  pt   seen  in er       Ct   head,  acute  stroke. R  frontal   infarct       ekg.  nsr       pt is  alert.  has  no  focal  deficits/ normal  speech        tele  monitoring      on asa/ lipitor      permissive  htn  for  initial  24  hours       MRI  head/  neuro  f/p       Echo ordered     *  HTN/ HLD    *  DM          follow   fs           home  meds.  janumet/ prandin   dvt ppx/  s/q  heparin   daughter   at bedside       rad< from: CT Head No Cont (12.08.23 @ 16:44) >  FINDINGS:  Acute/subacute right anterolateral frontal infarct. No CT evidence for   hemorrhagic transformation.  No hydrocephalus, midline shift, or effacement of the basal cisterns.  The visualized paranasal sinuses and mastoid air cells are clear.  IMPRESSION:  Acute/subacute right anterolateral frontal infarct.  No CT evidence for hemorrhagic transformation.  Dr. Massey discussed these findings with Dr. Austin on 12/8/2023 4:57 PM   with read back.  --- End of Report ---

## 2023-12-08 NOTE — ED ADULT NURSE NOTE - NS ED NURSE RECORD ANOTHER VITAL SIGN
Patient called and stated that she called last week for an order for supplies for her CPAP machine and they only received half of the order.  Please call back to discuss  Thank you   Yes

## 2023-12-08 NOTE — ED PROVIDER NOTE - CLINICAL SUMMARY MEDICAL DECISION MAKING FREE TEXT BOX
78F pmhx htn, hld, dm who presents with intermittent memory loss duration x 2 days, brought in by daughter for evaluation. pt appears more tired and forgetful per daughter at bedside. Pt reports headache yday from right neck to of head brief, resolved with aspirin, not worse headache of life and since resolved since yday. No fevers/chest pain/ sob/ imbalance/ dizziness/ or headache currently. No slurred speech. No difficulty recognizing family or situation but has incidences where she forgets she did something and does it again which is not like her. She is usually very active. Pt notes stress and poor sleep. Pt denies fall or head injury. Pt states she does walk with assistance due to old right knee meniscus injury   - nonspecific memory deficits, appears cognitively intact aox3 with no focal deficits, nihss 0, no head injury, symptoms not suggestive of SAH, eval labs for infcn, metabolic derangements, ct brain rule out ich/ cva, fluids, reassess,

## 2023-12-08 NOTE — H&P ADULT - NSHPLABSRESULTS_GEN_ALL_CORE
LABS:                        12.1   9.74  )-----------( 181      ( 08 Dec 2023 15:00 )             37.2         140  |  109<H>  |  27<H>  ----------------------------<  110<H>  4.1   |  27  |  1.16    Ca    10.5<H>      08 Dec 2023 15:00  Mg     1.9         TPro  8.0  /  Alb  3.6  /  TBili  0.4  /  DBili  x   /  AST  13<L>  /  ALT  17  /  AlkPhos  65            Urinalysis Basic - ( 08 Dec 2023 16:30 )    Color: Yellow / Appearance: Clear / S.018 / pH: x  Gluc: x / Ketone: Negative mg/dL  / Bili: Negative / Urobili: 0.2 mg/dL   Blood: x / Protein: Trace mg/dL / Nitrite: Negative   Leuk Esterase: Negative / RBC: None Seen /HPF / WBC 3-5 /HPF   Sq Epi: x / Non Sq Epi: x / Bacteria: Occasional /HPF              Thyroid Stimulating Hormone, Serum: 1.520 uU/mL ( @ 15:00)

## 2023-12-08 NOTE — PATIENT PROFILE ADULT - FALL HARM RISK - HARM RISK INTERVENTIONS
Assistance with ambulation/Assistance OOB with selected safe patient handling equipment/Communicate Risk of Fall with Harm to all staff/Discuss with provider need for PT consult/Monitor gait and stability/Provide patient with walking aids - walker, cane, crutches/Reinforce activity limits and safety measures with patient and family/Tailored Fall Risk Interventions/Visual Cue: Yellow wristband and red socks/Bed in lowest position, wheels locked, appropriate side rails in place/Call bell, personal items and telephone in reach/Instruct patient to call for assistance before getting out of bed or chair/Non-slip footwear when patient is out of bed/[place] to call system/Physically safe environment - no spills, clutter or unnecessary equipment/Purposeful Proactive Rounding/Room/bathroom lighting operational, light cord in reach Assistance with ambulation/Assistance OOB with selected safe patient handling equipment/Communicate Risk of Fall with Harm to all staff/Discuss with provider need for PT consult/Monitor gait and stability/Provide patient with walking aids - walker, cane, crutches/Reinforce activity limits and safety measures with patient and family/Tailored Fall Risk Interventions/Visual Cue: Yellow wristband and red socks/Bed in lowest position, wheels locked, appropriate side rails in place/Call bell, personal items and telephone in reach/Instruct patient to call for assistance before getting out of bed or chair/Non-slip footwear when patient is out of bed/[place] to call system/Physically safe environment - no spills, clutter or unnecessary equipment/Purposeful Proactive Rounding/Room/bathroom lighting operational, light cord in reach

## 2023-12-08 NOTE — H&P ADULT - PATIENT'S GENDER IDENTITY
[facility]  [address] [place]        Date:2023                                                  Patient Name: Kourtney Lara    MRN: [id number]    : 1954    Room: [address] [place] [address]      Evaluating OT: Price Basque OTR/L; [id number]     Referring Provider and Specific Provider Orders/Date:      11/10/23 0730  OT eval and treat  Start:  11/10/23 0730,   End:  11/10/23 0730,   ONE TIME,   Standing Count:  1 Occurrences,   R         Willie Jarvis MD      Placement Recommendation: Subacute        Diagnosis:   1. Diverticulitis of colon    2.  Fall, initial encounter         Surgery: none       Pertinent Medical History:       Past Medical History:   Diagnosis Date    Acid reflux     Atrial fibrillation (HCC)     Cancer ([address]) 2005    Breast Cancer (Left Breast)    Chronic back pain     CPAP (continuous positive airway pressure) dependence     not using CPAP    Fibromyalgia     History of bariatric surgery 2002    Dr. Adria Gold RYGB    History of cardiovascular stress test 8/10/2012    LEXISCAN    Hyperlipidemia     Hypertension     Kidney stones     Low iron     Osteoarthritis     Unspecified sleep apnea     Patient does not use C-Pap          Past Surgical History:   Procedure Laterality Date    APPENDECTOMY      BARIATRIC SURGERY  2003    [facility] Bishop  2005    Dr. Chelsi Boone      Dr. Al Littlejohn      EFREM Ho  8/10/2012         HERNIA REPAIR  2004    Dr. Radha Mon Left 2021    LEFT INGUINAL [address], POSSIBLE BILATERAL,  LAPAROSCOPIC ROBOTIC XI performed by Elena Alvarez MD at [address] [place] [address] 7 ([address] LB Dressing Maximal Assist   Minimal Assist    Bathing Maximal Assist  Minimal Assist    Toileting Dependent   Supervision    Bed Mobility  Supine to sit: N/T as pt was seated on bedside commode. Sit to supine: Moderate Assist x 2   Rolling: Moderate Assist    Minimal assist for positioning in bed for symmetry and comfort. L LE elevated on a pillow with heel floating. Supine to sit: Independent   Sit to supine: Independent   Rolling:Independent     Functional Transfers Maximal Assist from bedside commode to wheeled walker  Transfer training with verbal cues for hand placement throughout session to improve safety. Supervision    Functional Mobility Minimal assist with wheeled walker to improve balance from bedside commode to edge of bed, verbal cues for walker sequence and safety. Modified [place]    Balance Sitting:     Static: good     Dynamic: fair   Standing: fair  with wheeled walker  Sitting:     Static: good     Dynamic: good   Standing: good  with wheeled walker   Activity Tolerance fair   good    Visual/  Perceptual Glasses: yes                 Hand Dominance: right      AROM (PROM) Strength Additional Info:  Goal:   RUE  WFL 4/5 good  and wfl FMC/dexterity noted during ADL tasks   Improve overall RUE strength  for participation in functional tasks   LUE WFL 4/5 good  and wfl FMC/dexterity noted during ADL tasks   Improve overall LUE strength  for participation in functional tasks     Hearing: [facility]   Sensation:  No c/o numbness or tingling  Tone: WFL   Edema: no    Comments: Upon arrival the patient was seated in on commode. At end of session, patient was supine with L LE elevated on a pillow with heel floating, call light and phone within reach, all lines and tubes intact. Overall patient demonstrated decreased independence and safety during completion of ADL/functional transfer/mobility tasks.   Pt would benefit from continued skilled OT to increase safety and independence with Female

## 2023-12-08 NOTE — ED ADULT NURSE REASSESSMENT NOTE - NS ED NURSE REASSESS COMMENT FT1
Patient awake and alert, daughter at bedside. Patient taken to CT scan, report called to [facility]. Awaiting transport.

## 2023-12-08 NOTE — ED ADULT TRIAGE NOTE - CHIEF COMPLAINT QUOTE
pt c/o neck pain, headache x 2 days, resolved with aspirin. family noticed patient having problems with short term memory, disoriented yesterday. LKN x 2 days. /103. A&O x 3, sleepy in triage. hx: HTN, DM, high cholesterol. pt c/o neck pain, headache x 2 days, resolved with aspirin. family noticed patient having problems with short term memory, disoriented yesterday. LKN x 2 days. /103. A&O x 3, sleepy in triage. decreased sleep. hx: HTN, DM, high cholesterol.

## 2023-12-08 NOTE — PATIENT PROFILE ADULT - FUNCTIONAL ASSESSMENT - BASIC MOBILITY SECTION LABEL
General Surgery Week 1 Survey      Responses   Facility patient discharged from?  [place]   Does the patient have one of the following disease processes/diagnoses(primary or secondary)?  General Surgery   Is there a successful TCM telephone encounter documented?  No   Week 1 attempt successful?  Yes   Call start time  0804   Rescheduled  Rescheduled-pt requested          Pattie Carlisle RN   .

## 2023-12-08 NOTE — H&P ADULT - HISTORY OF PRESENT ILLNESS
78F      h/o  HTN/  HLD/  DM / anemia     presents with intermittent memory loss duration x 2 days,        hence brought in by daughter for evaluation.      pt appears more tired and forgetful per daughter       denies  fevers/chest pain/ sob/ imbalance/ dizziness/ or headache      denies   slurred speech.  ams/  limb weakness    daughter noted,  brief  perseverance of words  at home/ since  resolved     pt is  very pleasant   and able  to  recite ,  books  of [employer],  in numerical  order

## 2023-12-08 NOTE — H&P ADULT - NSHPPHYSICALEXAM_GEN_ALL_CORE
T(C): 36.4 (12-08-23 @ 19:15), Max: 37 (12-08-23 @ 11:50)  HR: 88 (12-08-23 @ 19:15) (81 - 88)  BP: 175/94 (12-08-23 @ 19:15) (108/93 - 175/94)  RR: 18 (12-08-23 @ 19:15) (14 - 18)  SpO2: 98% (12-08-23 @ 19:15) (96% - 98%)  GENERAL: NAD, lying in bed comfortably  HEAD:  Atraumatic, normocephalic  EYES: EOMI, PERRLA, conjunctiva and sclera clear  NECK: Supple, trachea midline, no JVD  HEART: Regular rate and rhythm, no murmurs, rubs, or gallops  LUNGS: Unlabored respirations.  Clear to auscultation bilaterally, no crackles, wheezing, or rhonchi  ABDOMEN: Soft, nontender, nondistended, +BS  EXTREMITIES: 2+ peripheral pulses bilaterally. No clubbing, cyanosis, or edema  NERVOUS SYSTEM:  A&Ox3, moving all extremities       , no focal deficits /  no  facial  weakness  SKIN: No rashes or lesions

## 2023-12-08 NOTE — ED PROVIDER NOTE - PROGRESS NOTE DETAILS
MD Eileen: Patient received on sign-out from Dr. Faustin. 78 F pmh HTN, HLD, DM presenting to ED by daughter for headache and forgetfulness over past 2 days. concern for acute/subacture R frontal infarct on CT. NIH 0. contacted neurology (Dr. Gay) with only recs for asa at the moment. will admit to telemetry

## 2023-12-08 NOTE — ED PROVIDER NOTE - OBJECTIVE STATEMENT
78F pmhx htn, hld, dm who presents with intermittent memory loss duration x 2 days, brought in by daughter for evaluation. pt appears more tired and forgetful per daughter at bedside. Pt reports headache yday from right neck to of head brief, resolved with aspirin, not worse headache of life and since resolved since yday. No fevers/chest pain/ sob/ imbalance/ dizziness/ or headache currently. No slurred speech. No difficulty recognizing family or situation but has incidences where she forgets she did something and does it again which is not like her. She is usually very active. Pt notes stress and poor sleep. Pt denies fall or head injury. Pt states she does walk with assistance due to old right knee meniscus injury

## 2023-12-09 LAB
A1C WITH ESTIMATED AVERAGE GLUCOSE RESULT: 8.4 % — HIGH (ref 4–5.6)
A1C WITH ESTIMATED AVERAGE GLUCOSE RESULT: 8.4 % — HIGH (ref 4–5.6)
ANION GAP SERPL CALC-SCNC: 4 MMOL/L — LOW (ref 5–17)
ANION GAP SERPL CALC-SCNC: 4 MMOL/L — LOW (ref 5–17)
BUN SERPL-MCNC: 21 MG/DL — SIGNIFICANT CHANGE UP (ref 7–23)
BUN SERPL-MCNC: 21 MG/DL — SIGNIFICANT CHANGE UP (ref 7–23)
CALCIUM SERPL-MCNC: 9.9 MG/DL — SIGNIFICANT CHANGE UP (ref 8.5–10.1)
CALCIUM SERPL-MCNC: 9.9 MG/DL — SIGNIFICANT CHANGE UP (ref 8.5–10.1)
CHLORIDE SERPL-SCNC: 112 MMOL/L — HIGH (ref 96–108)
CHLORIDE SERPL-SCNC: 112 MMOL/L — HIGH (ref 96–108)
CHOLEST SERPL-MCNC: 158 MG/DL — SIGNIFICANT CHANGE UP
CHOLEST SERPL-MCNC: 158 MG/DL — SIGNIFICANT CHANGE UP
CO2 SERPL-SCNC: 24 MMOL/L — SIGNIFICANT CHANGE UP (ref 22–31)
CO2 SERPL-SCNC: 24 MMOL/L — SIGNIFICANT CHANGE UP (ref 22–31)
CREAT SERPL-MCNC: 0.9 MG/DL — SIGNIFICANT CHANGE UP (ref 0.5–1.3)
CREAT SERPL-MCNC: 0.9 MG/DL — SIGNIFICANT CHANGE UP (ref 0.5–1.3)
EGFR: 66 ML/MIN/1.73M2 — SIGNIFICANT CHANGE UP
EGFR: 66 ML/MIN/1.73M2 — SIGNIFICANT CHANGE UP
ESTIMATED AVERAGE GLUCOSE: 194 MG/DL — HIGH (ref 68–114)
ESTIMATED AVERAGE GLUCOSE: 194 MG/DL — HIGH (ref 68–114)
GLUCOSE BLDC GLUCOMTR-MCNC: 132 MG/DL — HIGH (ref 70–99)
GLUCOSE BLDC GLUCOMTR-MCNC: 132 MG/DL — HIGH (ref 70–99)
GLUCOSE BLDC GLUCOMTR-MCNC: 164 MG/DL — HIGH (ref 70–99)
GLUCOSE BLDC GLUCOMTR-MCNC: 164 MG/DL — HIGH (ref 70–99)
GLUCOSE BLDC GLUCOMTR-MCNC: 183 MG/DL — HIGH (ref 70–99)
GLUCOSE BLDC GLUCOMTR-MCNC: 183 MG/DL — HIGH (ref 70–99)
GLUCOSE BLDC GLUCOMTR-MCNC: 200 MG/DL — HIGH (ref 70–99)
GLUCOSE BLDC GLUCOMTR-MCNC: 200 MG/DL — HIGH (ref 70–99)
GLUCOSE BLDC GLUCOMTR-MCNC: 213 MG/DL — HIGH (ref 70–99)
GLUCOSE BLDC GLUCOMTR-MCNC: 213 MG/DL — HIGH (ref 70–99)
GLUCOSE SERPL-MCNC: 163 MG/DL — HIGH (ref 70–99)
GLUCOSE SERPL-MCNC: 163 MG/DL — HIGH (ref 70–99)
HCT VFR BLD CALC: 36.1 % — SIGNIFICANT CHANGE UP (ref 34.5–45)
HCT VFR BLD CALC: 36.1 % — SIGNIFICANT CHANGE UP (ref 34.5–45)
HCV AB S/CO SERPL IA: 0.09 S/CO — SIGNIFICANT CHANGE UP (ref 0–0.99)
HCV AB S/CO SERPL IA: 0.09 S/CO — SIGNIFICANT CHANGE UP (ref 0–0.99)
HCV AB SERPL-IMP: SIGNIFICANT CHANGE UP
HCV AB SERPL-IMP: SIGNIFICANT CHANGE UP
HDLC SERPL-MCNC: 52 MG/DL — SIGNIFICANT CHANGE UP
HDLC SERPL-MCNC: 52 MG/DL — SIGNIFICANT CHANGE UP
HGB BLD-MCNC: 11.7 G/DL — SIGNIFICANT CHANGE UP (ref 11.5–15.5)
HGB BLD-MCNC: 11.7 G/DL — SIGNIFICANT CHANGE UP (ref 11.5–15.5)
LIPID PNL WITH DIRECT LDL SERPL: 89 MG/DL — SIGNIFICANT CHANGE UP
LIPID PNL WITH DIRECT LDL SERPL: 89 MG/DL — SIGNIFICANT CHANGE UP
MCHC RBC-ENTMCNC: 28.5 PG — SIGNIFICANT CHANGE UP (ref 27–34)
MCHC RBC-ENTMCNC: 28.5 PG — SIGNIFICANT CHANGE UP (ref 27–34)
MCHC RBC-ENTMCNC: 32.4 G/DL — SIGNIFICANT CHANGE UP (ref 32–36)
MCHC RBC-ENTMCNC: 32.4 G/DL — SIGNIFICANT CHANGE UP (ref 32–36)
MCV RBC AUTO: 87.8 FL — SIGNIFICANT CHANGE UP (ref 80–100)
MCV RBC AUTO: 87.8 FL — SIGNIFICANT CHANGE UP (ref 80–100)
NON HDL CHOLESTEROL: 106 MG/DL — SIGNIFICANT CHANGE UP
NON HDL CHOLESTEROL: 106 MG/DL — SIGNIFICANT CHANGE UP
NRBC # BLD: 0 /100 WBCS — SIGNIFICANT CHANGE UP (ref 0–0)
NRBC # BLD: 0 /100 WBCS — SIGNIFICANT CHANGE UP (ref 0–0)
PLATELET # BLD AUTO: 173 K/UL — SIGNIFICANT CHANGE UP (ref 150–400)
PLATELET # BLD AUTO: 173 K/UL — SIGNIFICANT CHANGE UP (ref 150–400)
POTASSIUM SERPL-MCNC: 3.8 MMOL/L — SIGNIFICANT CHANGE UP (ref 3.5–5.3)
POTASSIUM SERPL-MCNC: 3.8 MMOL/L — SIGNIFICANT CHANGE UP (ref 3.5–5.3)
POTASSIUM SERPL-SCNC: 3.8 MMOL/L — SIGNIFICANT CHANGE UP (ref 3.5–5.3)
POTASSIUM SERPL-SCNC: 3.8 MMOL/L — SIGNIFICANT CHANGE UP (ref 3.5–5.3)
RBC # BLD: 4.11 M/UL — SIGNIFICANT CHANGE UP (ref 3.8–5.2)
RBC # BLD: 4.11 M/UL — SIGNIFICANT CHANGE UP (ref 3.8–5.2)
RBC # FLD: 14.7 % — HIGH (ref 10.3–14.5)
RBC # FLD: 14.7 % — HIGH (ref 10.3–14.5)
SODIUM SERPL-SCNC: 140 MMOL/L — SIGNIFICANT CHANGE UP (ref 135–145)
SODIUM SERPL-SCNC: 140 MMOL/L — SIGNIFICANT CHANGE UP (ref 135–145)
TRIGL SERPL-MCNC: 90 MG/DL — SIGNIFICANT CHANGE UP
TRIGL SERPL-MCNC: 90 MG/DL — SIGNIFICANT CHANGE UP
TSH SERPL-MCNC: 2.05 UU/ML — SIGNIFICANT CHANGE UP (ref 0.36–3.74)
TSH SERPL-MCNC: 2.05 UU/ML — SIGNIFICANT CHANGE UP (ref 0.36–3.74)
WBC # BLD: 9.04 K/UL — SIGNIFICANT CHANGE UP (ref 3.8–10.5)
WBC # BLD: 9.04 K/UL — SIGNIFICANT CHANGE UP (ref 3.8–10.5)
WBC # FLD AUTO: 9.04 K/UL — SIGNIFICANT CHANGE UP (ref 3.8–10.5)
WBC # FLD AUTO: 9.04 K/UL — SIGNIFICANT CHANGE UP (ref 3.8–10.5)

## 2023-12-09 PROCEDURE — 99232 SBSQ HOSP IP/OBS MODERATE 35: CPT

## 2023-12-09 RX ADMIN — Medication 2: at 13:18

## 2023-12-09 RX ADMIN — LIDOCAINE 1 PATCH: 4 CREAM TOPICAL at 04:30

## 2023-12-09 RX ADMIN — LISINOPRIL 20 MILLIGRAM(S): 2.5 TABLET ORAL at 05:34

## 2023-12-09 RX ADMIN — Medication 1: at 08:24

## 2023-12-09 RX ADMIN — ATORVASTATIN CALCIUM 80 MILLIGRAM(S): 80 TABLET, FILM COATED ORAL at 22:20

## 2023-12-09 RX ADMIN — HEPARIN SODIUM 5000 UNIT(S): 5000 INJECTION INTRAVENOUS; SUBCUTANEOUS at 17:08

## 2023-12-09 RX ADMIN — Medication 81 MILLIGRAM(S): at 13:03

## 2023-12-09 RX ADMIN — HEPARIN SODIUM 5000 UNIT(S): 5000 INJECTION INTRAVENOUS; SUBCUTANEOUS at 05:33

## 2023-12-09 NOTE — CONSULT NOTE ADULT - ASSESSMENT
Probable subacute right frontal stroke  HTN  HLD  DM2    - Aspirin 81mg, Plavix 75mg, and statin daily for secondary stroke prevention  - MRI brain pending  - Echo pending  - speech therapy  - discussed with daughter at bedside    Thank you for the consult.

## 2023-12-09 NOTE — CONSULT NOTE ADULT - SUBJECTIVE AND OBJECTIVE BOX
HPI: 78 year old woman with hx of HTN, HLD, and DM presenting with cognitive problem since 12/7/23. Patient woke up with symptoms and her family noticed something was not right. LKW 12/6/23. She was not processing her thoughts normally. She would perseverate. No facial droop or focal weakness. No history of stroke. CT head shows right frontal stroke. CTA head/neck showed "Numerous skull base and intracranial stenoses".    PMHx: HTN, HLD, DM2  PSHx: None  PFHx: None  Social Hx: non-smoker, no etoh, no illicit drug use  Allergies: Biaxin, shellfish  ROS: cognitive dysfunction  Medications: see EMR    Vitals: Temp 98.8F       RR 18        HR 83       /83  General: NAD  CV: regular rate and rhythm  Resp: no respiratory distress  Neck: supple  Neuro Exam: AOx3. Follows commands. No dysarthria. Subtle aphasia. States she follows with ENT (ears, nose throat) for her diabetes when she meant to say endocrinologist. EOM intact. No facial droop. PERRL. VFF. Tongue is midline. Palate elevate symmetrically. Shoulder shrug is intact. Finger to nose and heel to shin intact. Moving all four extremities. No focal weakness. Reflexes symmetric and toes down. Gait exam deferred. Sensory intact to touch.     NIHSS- 1    CT head, CTA head/neck, and labs reviewed

## 2023-12-10 LAB
CULTURE RESULTS: SIGNIFICANT CHANGE UP
CULTURE RESULTS: SIGNIFICANT CHANGE UP
GLUCOSE BLDC GLUCOMTR-MCNC: 133 MG/DL — HIGH (ref 70–99)
GLUCOSE BLDC GLUCOMTR-MCNC: 133 MG/DL — HIGH (ref 70–99)
GLUCOSE BLDC GLUCOMTR-MCNC: 156 MG/DL — HIGH (ref 70–99)
GLUCOSE BLDC GLUCOMTR-MCNC: 156 MG/DL — HIGH (ref 70–99)
GLUCOSE BLDC GLUCOMTR-MCNC: 172 MG/DL — HIGH (ref 70–99)
GLUCOSE BLDC GLUCOMTR-MCNC: 172 MG/DL — HIGH (ref 70–99)
GLUCOSE BLDC GLUCOMTR-MCNC: 196 MG/DL — HIGH (ref 70–99)
GLUCOSE BLDC GLUCOMTR-MCNC: 196 MG/DL — HIGH (ref 70–99)
SPECIMEN SOURCE: SIGNIFICANT CHANGE UP
SPECIMEN SOURCE: SIGNIFICANT CHANGE UP

## 2023-12-10 PROCEDURE — 70551 MRI BRAIN STEM W/O DYE: CPT | Mod: 26

## 2023-12-10 PROCEDURE — 99232 SBSQ HOSP IP/OBS MODERATE 35: CPT

## 2023-12-10 RX ADMIN — Medication 1: at 12:21

## 2023-12-10 RX ADMIN — Medication 81 MILLIGRAM(S): at 12:15

## 2023-12-10 RX ADMIN — Medication 1: at 08:24

## 2023-12-10 RX ADMIN — HEPARIN SODIUM 5000 UNIT(S): 5000 INJECTION INTRAVENOUS; SUBCUTANEOUS at 06:03

## 2023-12-10 RX ADMIN — LISINOPRIL 20 MILLIGRAM(S): 2.5 TABLET ORAL at 06:03

## 2023-12-10 RX ADMIN — HEPARIN SODIUM 5000 UNIT(S): 5000 INJECTION INTRAVENOUS; SUBCUTANEOUS at 17:29

## 2023-12-10 RX ADMIN — ATORVASTATIN CALCIUM 80 MILLIGRAM(S): 80 TABLET, FILM COATED ORAL at 21:55

## 2023-12-10 NOTE — PHYSICAL THERAPY INITIAL EVALUATION ADULT - GENERAL OBSERVATIONS, REHAB EVAL
Pt recd supine nad. Ax O 4(partial to time). Able to f/u 2 step commands. C/o pain at the top of head 6/10 No c/o one sided weakness. Pt has L eye mildly blurry. No c/o paresthesaie. Pt has h/o R knee meniscal injury and lx spine pain. She takes [facility] PT services 2/wk  at Dr [facility] PT clinic  in [place]( Able to recall address as [address] [facility] without any delay) Pt recd supine nad. Ax O 4(partial to time). Able to f/u 2 step commands. C/o pain at the top of head 6/10 No c/o one sided weakness. Pt has L eye mildly blurry. No c/o paresthesaie. Pt has h/o R knee meniscal injury and lx spine pain. She takes [facility] PT services 2/wk  at Dr Del Toro[facility] PT clinic  in [place]( Able to recall address as [address] [facility] without any delay)

## 2023-12-10 NOTE — PHYSICAL THERAPY INITIAL EVALUATION ADULT - NSPTDISCHREC_GEN_A_CORE
Cont OPD Pt services for R knee/LB -Pt is functioning at baseline level . Pt has SAC at home/Outpatient PT

## 2023-12-10 NOTE — PHYSICAL THERAPY INITIAL EVALUATION ADULT - MODALITIES TREATMENT COMMENTS
Pt does not have any new functional deficits from CVA. There is no decline in bed mob/xfers /gait/stair negotiation skills. Pt is functioning at prior level of function. Restorative PT to improve function not indicated. Pt  want sto cont with OPD PT services

## 2023-12-11 LAB
GLUCOSE BLDC GLUCOMTR-MCNC: 154 MG/DL — HIGH (ref 70–99)
GLUCOSE BLDC GLUCOMTR-MCNC: 154 MG/DL — HIGH (ref 70–99)
GLUCOSE BLDC GLUCOMTR-MCNC: 160 MG/DL — HIGH (ref 70–99)
GLUCOSE BLDC GLUCOMTR-MCNC: 160 MG/DL — HIGH (ref 70–99)
GLUCOSE BLDC GLUCOMTR-MCNC: 205 MG/DL — HIGH (ref 70–99)
GLUCOSE BLDC GLUCOMTR-MCNC: 205 MG/DL — HIGH (ref 70–99)
GLUCOSE BLDC GLUCOMTR-MCNC: 218 MG/DL — HIGH (ref 70–99)
GLUCOSE BLDC GLUCOMTR-MCNC: 218 MG/DL — HIGH (ref 70–99)

## 2023-12-11 PROCEDURE — ZZZZZ: CPT

## 2023-12-11 PROCEDURE — 93306 TTE W/DOPPLER COMPLETE: CPT | Mod: 26

## 2023-12-11 PROCEDURE — 99231 SBSQ HOSP IP/OBS SF/LOW 25: CPT

## 2023-12-11 RX ORDER — LISINOPRIL 2.5 MG/1
20 TABLET ORAL ONCE
Refills: 0 | Status: COMPLETED | OUTPATIENT
Start: 2023-12-11 | End: 2023-12-11

## 2023-12-11 RX ORDER — LISINOPRIL 2.5 MG/1
40 TABLET ORAL DAILY
Refills: 0 | Status: DISCONTINUED | OUTPATIENT
Start: 2023-12-12 | End: 2023-12-13

## 2023-12-11 RX ADMIN — ATORVASTATIN CALCIUM 80 MILLIGRAM(S): 80 TABLET, FILM COATED ORAL at 22:26

## 2023-12-11 RX ADMIN — Medication 2: at 12:31

## 2023-12-11 RX ADMIN — LISINOPRIL 20 MILLIGRAM(S): 2.5 TABLET ORAL at 12:30

## 2023-12-11 RX ADMIN — HEPARIN SODIUM 5000 UNIT(S): 5000 INJECTION INTRAVENOUS; SUBCUTANEOUS at 06:58

## 2023-12-11 RX ADMIN — Medication 81 MILLIGRAM(S): at 12:30

## 2023-12-11 RX ADMIN — Medication 1: at 17:52

## 2023-12-11 RX ADMIN — LISINOPRIL 20 MILLIGRAM(S): 2.5 TABLET ORAL at 06:51

## 2023-12-11 RX ADMIN — HEPARIN SODIUM 5000 UNIT(S): 5000 INJECTION INTRAVENOUS; SUBCUTANEOUS at 17:52

## 2023-12-11 RX ADMIN — Medication 1: at 09:01

## 2023-12-11 NOTE — OCCUPATIONAL THERAPY INITIAL EVALUATION ADULT - DEEP PRESSURE SENSATION, HEAD/NECK, OT EVAL
Call received and message received from Sun at [facility].  She is calling to report Ishmael recent TSH came back at 11.92.    Last TSH completed on 3/7/2019---15.761.    Is taking levothyroxine (SYNTHROID, LEVOTHROID) 125 MCG tablet daily.    If any medication changes need to contact daughter as she prep patients medications and will need to know the change.     Please review and advise.    within normal limits

## 2023-12-11 NOTE — SWALLOW BEDSIDE ASSESSMENT ADULT - SWALLOW EVAL: DIAGNOSIS
Pt presented with oropharyngeal skills deemed WFL for puree, regular solid and thin liquid consistencies marked by timely A-P transit, timely pharyngeal swallow trigger and no overt signs of suspected aspiration.

## 2023-12-11 NOTE — OCCUPATIONAL THERAPY INITIAL EVALUATION ADULT - PERTINENT HX OF CURRENT PROBLEM, REHAB EVAL
Pt is a 77 y/o female who presented to ER on due to acute onset of neurological deficit. As per chart , pt had  intermittent memory loss duration x 2 days. Pt is diagnosed with Stroke. MRI on 12/10 results confirm Acute moderate to large right frontal lobe infarction.

## 2023-12-11 NOTE — OCCUPATIONAL THERAPY INITIAL EVALUATION ADULT - PLANNED THERAPY INTERVENTIONS, OT EVAL
energy conservation techniques/ADL retraining/IADL retraining/balance training/bed mobility training/strengthening/transfer training

## 2023-12-11 NOTE — OCCUPATIONAL THERAPY INITIAL EVALUATION ADULT - LIVES WITH, PROFILE
resides with family in a private house with 4 entry steps equipped with left ascending handrail. Pt stays  the main floor . The bathroom has a tub/shower combination , fixed shower head and standard toilet.

## 2023-12-11 NOTE — OCCUPATIONAL THERAPY INITIAL EVALUATION ADULT - BALANCE TRAINING, PT EVAL
Pt will increased standing balance from good// /fair+ to good in 30 days to prevent falls, optimize pt's ability for ADL management & safely navigate in all terrains

## 2023-12-11 NOTE — OCCUPATIONAL THERAPY INITIAL EVALUATION ADULT - SOCIAL CONCERNS
Pt voiced concerns about her current CVA ad hope s it does not recur. Pt will bw supported by her family after she is discharged home/Complex psychosocial needs/coping issues

## 2023-12-11 NOTE — OCCUPATIONAL THERAPY INITIAL EVALUATION ADULT - GENERAL OBSERVATIONS, REHAB EVAL
Pt was seen for initial OT consult, encountered in bed on cardiac monitoring in NAD. Pt makes needs and wants known. Pt moves BUE/ BLE without difficulties aand integrates both sides of her body. Pt was AA&Ox4, cooperative & followed commands. Pt c/o chronic low pain with movements. Pt presents with generalized weakness due to recent infarct; this limits pt's activity tolerance ,balance, ADL management and functional mobility.

## 2023-12-11 NOTE — OCCUPATIONAL THERAPY INITIAL EVALUATION ADULT - ADDITIONAL COMMENTS
Prior to admission, pt was functioning in her roles, self sufficient & ambulating independently with SAC.  Pt received outpatient PT services 2wkly for chronic back pain . Pt current function is congruent to preadmission status despite recent left frontal lobe infarction. Pt dressed lower body with set up and supervision. Pt engaged in functional transfers and ambulated 25 ft x2 to bathroom with supervision for toileting transfers, no loss of balance was note. Pt is right hand dominant and wears glasses for reading. Dexterity and fine motor skills are WFL in both hands. Pt is a diabetic, has a working glucometer and independently monitored her finger sticks at home.

## 2023-12-11 NOTE — SWALLOW BEDSIDE ASSESSMENT ADULT - SLP GENERAL OBSERVATIONS
Pt approached reclined in bed; HOB elevated with NAD. Pt able to communicate wants/needs and follow commands without difficulty.

## 2023-12-11 NOTE — OCCUPATIONAL THERAPY INITIAL EVALUATION ADULT - RANGE OF MOTION EXAMINATION, UPPER EXTREMITY
Left UE Active ROM was WFL (within functional limits)/Left UE Passive ROM was WFL  (within functional limits)/Right UE Active ROM was WNL (within normal limits)/Right UE Passive ROM was WNL (within normal limits)

## 2023-12-12 ENCOUNTER — TRANSCRIPTION ENCOUNTER (OUTPATIENT)
Age: 78
End: 2023-12-12

## 2023-12-12 LAB
GLUCOSE BLDC GLUCOMTR-MCNC: 171 MG/DL — HIGH (ref 70–99)
GLUCOSE BLDC GLUCOMTR-MCNC: 171 MG/DL — HIGH (ref 70–99)
GLUCOSE BLDC GLUCOMTR-MCNC: 199 MG/DL — HIGH (ref 70–99)
GLUCOSE BLDC GLUCOMTR-MCNC: 199 MG/DL — HIGH (ref 70–99)
GLUCOSE BLDC GLUCOMTR-MCNC: 218 MG/DL — HIGH (ref 70–99)
GLUCOSE BLDC GLUCOMTR-MCNC: 218 MG/DL — HIGH (ref 70–99)
GLUCOSE BLDC GLUCOMTR-MCNC: 272 MG/DL — HIGH (ref 70–99)
GLUCOSE BLDC GLUCOMTR-MCNC: 272 MG/DL — HIGH (ref 70–99)
RAPID RVP RESULT: SIGNIFICANT CHANGE UP
RAPID RVP RESULT: SIGNIFICANT CHANGE UP
SARS-COV-2 RNA SPEC QL NAA+PROBE: SIGNIFICANT CHANGE UP
SARS-COV-2 RNA SPEC QL NAA+PROBE: SIGNIFICANT CHANGE UP

## 2023-12-12 PROCEDURE — 99231 SBSQ HOSP IP/OBS SF/LOW 25: CPT

## 2023-12-12 PROCEDURE — 71045 X-RAY EXAM CHEST 1 VIEW: CPT | Mod: 26

## 2023-12-12 RX ORDER — REPAGLINIDE 1 MG/1
1 TABLET ORAL
Qty: 81 | Refills: 0
Start: 2023-12-12 | End: 2024-01-07

## 2023-12-12 RX ORDER — LISINOPRIL 2.5 MG/1
1 TABLET ORAL
Refills: 0 | DISCHARGE

## 2023-12-12 RX ORDER — ATORVASTATIN CALCIUM 80 MG/1
1 TABLET, FILM COATED ORAL
Qty: 30 | Refills: 0
Start: 2023-12-12 | End: 2024-01-10

## 2023-12-12 RX ORDER — ASPIRIN/CALCIUM CARB/MAGNESIUM 324 MG
1 TABLET ORAL
Qty: 30 | Refills: 0
Start: 2023-12-12 | End: 2024-01-10

## 2023-12-12 RX ORDER — ROSUVASTATIN CALCIUM 5 MG/1
1 TABLET ORAL
Refills: 0 | DISCHARGE

## 2023-12-12 RX ORDER — REPAGLINIDE 1 MG/1
1 TABLET ORAL
Qty: 90 | Refills: 0
Start: 2023-12-12 | End: 2024-01-10

## 2023-12-12 RX ORDER — LISINOPRIL 2.5 MG/1
1 TABLET ORAL
Qty: 0 | Refills: 0 | DISCHARGE
Start: 2023-12-12

## 2023-12-12 RX ORDER — REPAGLINIDE 1 MG/1
1 TABLET ORAL
Refills: 0 | DISCHARGE

## 2023-12-12 RX ORDER — AMLODIPINE BESYLATE 2.5 MG/1
1 TABLET ORAL
Qty: 30 | Refills: 0
Start: 2023-12-12 | End: 2024-01-10

## 2023-12-12 RX ORDER — AMLODIPINE BESYLATE 2.5 MG/1
10 TABLET ORAL DAILY
Refills: 0 | Status: DISCONTINUED | OUTPATIENT
Start: 2023-12-12 | End: 2023-12-13

## 2023-12-12 RX ADMIN — ATORVASTATIN CALCIUM 80 MILLIGRAM(S): 80 TABLET, FILM COATED ORAL at 21:53

## 2023-12-12 RX ADMIN — Medication 1: at 07:51

## 2023-12-12 RX ADMIN — LISINOPRIL 40 MILLIGRAM(S): 2.5 TABLET ORAL at 05:49

## 2023-12-12 RX ADMIN — Medication 1: at 17:32

## 2023-12-12 RX ADMIN — AMLODIPINE BESYLATE 10 MILLIGRAM(S): 2.5 TABLET ORAL at 10:42

## 2023-12-12 RX ADMIN — Medication 81 MILLIGRAM(S): at 11:58

## 2023-12-12 RX ADMIN — HEPARIN SODIUM 5000 UNIT(S): 5000 INJECTION INTRAVENOUS; SUBCUTANEOUS at 17:33

## 2023-12-12 RX ADMIN — HEPARIN SODIUM 5000 UNIT(S): 5000 INJECTION INTRAVENOUS; SUBCUTANEOUS at 05:48

## 2023-12-12 RX ADMIN — Medication 3: at 11:58

## 2023-12-12 NOTE — DISCHARGE NOTE PROVIDER - NSDCFUSCHEDAPPT_GEN_ALL_CORE_FT
[facility] Physician [facility]  ULTRASND  [facility]  Scheduled Appointment: 12/18/2023    Lynn Marina  [facility] Physician [facility]  ENDOCRIN [address]   Scheduled Appointment: 01/30/2024     [facility] Physician [facility]  ULTRASND  [facility]  Scheduled Appointment: 12/18/2023    Lynn Marina  [facility] Physician [facility]  ENDOCRIN [address]   Scheduled Appointment: 01/30/2024     [facility]  ULTRASND  [facility]  Scheduled Appointment: 12/18/2023    Jere Gonzalez  [facility]  ENDOCRIN [address]   Scheduled Appointment: 01/09/2024    Lynn Marina  [facility] [address]   Scheduled Appointment: 01/30/2024     [facility]  ULTRASND  [facility]  Scheduled Appointment: 12/18/2023    Jere Gonzalez  [facility]  ENDOCRIN [address]   Scheduled Appointment: 01/09/2024    Lynn Marina  [facility] [address]   Scheduled Appointment: 01/30/2024

## 2023-12-12 NOTE — DISCHARGE NOTE PROVIDER - ATTENDING DISCHARGE PHYSICAL EXAMINATION:
PHYSICAL EXAM:  GENERAL: NAD, well-groomed, well-developed  HEAD:  Atraumatic, Normocephalic  EYES: EOMI, PERRLA, conjunctiva and sclera clear  ENMT: No tonsillar erythema, exudates, or enlargement; Moist mucous membranes, Good dentition, No lesions  NECK: Supple,   CHEST/LUNG: Clear to  auscultation bilaterally; No rales, rhonchi, wheezing, or rubs  bilaterally  HEART: Regular rate and rhythm; No murmurs, rubs, or gallops  ABDOMEN: Soft, Nontender, Nondistended; Bowel sounds present  EXTREMITIES:  2+ Peripheral Pulses, No clubbing, cyanosis, or edema BL LE  SKIN: No rashes or lesions  NERVOUS SYSTEM:  Alert & Oriented X3, Good concentration; Motor Strength 5/5 B/L upper and lower extremities;   DTRs 2+ intact and symmetric, sensation intact BL

## 2023-12-12 NOTE — SPEECH LANGUAGE PATHOLOGY EVALUATION - SLP DIAGNOSIS
pt presented with receptive and expressive language WFL and good speech intelligibility. receptive language marked by adequate word recognition for pictures, colors, letters, and numbers; adequate body part ID and left right discrimination; adequate auditory comprehension for biographical/factual yes no questions; adequate auditory processing; and following 1-2 step directions. oral reading of words and reading comprehension for phrases. expressive language marked by adequate automatic sequences; adequate repetition at the word, phrase and sentence level and adequate responsive/confrontational naming. motor speech WFL

## 2023-12-12 NOTE — DISCHARGE NOTE PROVIDER - NSDCMRMEDTOKEN_GEN_ALL_CORE_FT
amLODIPine 10 mg oral tablet: 1 tab(s) orally once a day  aspirin 81 mg oral tablet, chewable: 1 tab(s) orally once a day  ferrous fumarate 325 mg (106 mg elemental iron) oral tablet: 1 tab(s) orally once a day  gabapentin 100 mg oral tablet: orally 2 times a day   mg oral tablet: 1 tab(s) orally every 4 to 6 hours, As Needed -for moderate pain   Janumet 50 mg-1000 mg oral tablet: 1 tab(s) orally 2 times a day  Lipitor 80 mg oral tablet: 1 tab(s) orally once a day (at bedtime)  lisinopril 40 mg oral tablet: 1 tab(s) orally once a day  PT/OT and Speech: Once  repaglinide 1 mg oral tablet: 1 tab(s) orally 3 times a day  rosuvastatin 20 mg oral capsule: 1 cap(s) orally once a day (at bedtime)   amLODIPine 10 mg oral tablet: 1 tab(s) orally once a day  aspirin 81 mg oral tablet, chewable: 1 tab(s) orally once a day  ferrous fumarate 325 mg (106 mg elemental iron) oral tablet: 1 tab(s) orally once a day  gabapentin 100 mg oral tablet: orally 2 times a day   mg oral tablet: 1 tab(s) orally every 4 to 6 hours, As Needed -for moderate pain   Janumet 50 mg-1000 mg oral tablet: 1 tab(s) orally 2 times a day  Lipitor 80 mg oral tablet: 1 tab(s) orally once a day (at bedtime)  lisinopril 40 mg oral tablet: 1 tab(s) orally once a day  PT/OT and Speech: Once  repaglinide 1 mg oral tablet: 1 tab(s) orally 3 times a day   amLODIPine 10 mg oral tablet: 1 tab(s) orally once a day  aspirin 81 mg oral tablet, chewable: 1 tab(s) orally once a day  ferrous fumarate 325 mg (106 mg elemental iron) oral tablet: 1 tab(s) orally once a day  gabapentin 100 mg oral tablet: orally 2 times a day   mg oral tablet: 1 tab(s) orally every 4 to 6 hours, As Needed -for moderate pain   Janumet 50 mg-1000 mg oral tablet: 1 tab(s) orally 2 times a day  Lipitor 80 mg oral tablet: 1 tab(s) orally once a day (at bedtime)  lisinopril 40 mg oral tablet: 1 tab(s) orally once a day  PT/OT and Speech: Once  repaglinide 2 mg oral tablet: 1 tab(s) orally 3 times a day

## 2023-12-12 NOTE — DISCHARGE NOTE PROVIDER - NSDCCPCAREPLAN_GEN_ALL_CORE_FT
PRINCIPAL DISCHARGE DIAGNOSIS  Diagnosis: Stroke  Assessment and Plan of Treatment: A cerebral vascular accident (CVA) is the medical term for a stroke.  There are two types of strokes embolic and hemorrhagic.  Embolic strokes are caused by particles within the blood stream traveling to the brain and blocking blood flow.  Hemorragic strokes are caused by bleeding within the brain.  In either case, a CVA is a medical emergency.  An easy way to remember the symptoms of a stroke is to remember BE FAST.  Balance - Does the person have new trouble standing or walking?  Eyes - Does the person have new trouble with vision?  Face - Is there new one sided facial droop?    Arm - Does the patient suddenly have weakness in one or both arms?  Speech - Is there new slurred speech or inability to speak?  Time - Time loss leads to loss of brain function.  If you notice ANY sign call 9-1-1.     PRINCIPAL DISCHARGE DIAGNOSIS  Diagnosis: Stroke  Assessment and Plan of Treatment: acute cva- f/u with Primary care , cardiologist and neurologist   ECHO  Summary:   1. Normal global left ventricular systolic function.   2. Mild mitral annular calcification.   3. Mild mitral valve regurgitation.   4. Mild thickening of the anterior and posterior mitral valve leaflets.   5. Moderate pulmonic valve regurgitation.   6. Color flow doppler and intravenousinjection of agitated saline   demonstrates the presence of an intact intra atrial septum.   7. No evidence of any thrombus.        SECONDARY DISCHARGE DIAGNOSES  Diagnosis: HLD (hyperlipidemia)  Assessment and Plan of Treatment:     Diagnosis: Benign essential HTN  Assessment and Plan of Treatment: take bp meds and chcek bp twice a day    Diagnosis: DM (diabetes mellitus)  Assessment and Plan of Treatment: check fs befire each meal

## 2023-12-12 NOTE — SPEECH LANGUAGE PATHOLOGY EVALUATION - SLP GENERAL OBSERVATIONS
pt seen bedside, sitting upright in 2D telemetry on RA and alert. administered functional communication assessment. pt motivated and cooperative. she denied difficulty with communication/speech

## 2023-12-12 NOTE — DISCHARGE NOTE PROVIDER - CARE PROVIDER_API CALL
Nai Schneider  Neurology  [address] [place] [address]  Phone: [phone]  Fax: [phone]  Follow Up Time: 2 weeks   Nai Schneider  Neurology  [address] [place] [address]  Phone: [phone]  Fax: [phone]  Follow Up Time: 2 weeks

## 2023-12-12 NOTE — PROGRESS NOTE ADULT - ASSESSMENT
78F      h/o  HTN/  HLD/  DM / anemia       presents with intermittent memory loss duration x 2 days,    hence brought in by daughter for evaluation.    pt is  very pleasant   and able  to  recite ,  books  of [employer],  in numerical  order     *  admitted  as  code  stroke/  neuro   dr grove, called by  er  dr     + acute  stroke , on head ct    NIH score 0.  pt   seen  in er       Ct   head,  acute  stroke. R  frontal   infarct       ekg.  nsr       pt is  alert.  has  no  focal  deficits/ normal  speech        tele  monitoring      on asa/ lipitor      permissive  htn  for  initial  24  hours     f/u   MRI  head/  neuro  f/p    f/u    Echo ordered    12/10/2023 await mri /echo and therapy eval all still eonding   *  HTN/ HLD -  12/10/2023 will increase lisinopril to 40mg     *  DM          follow   fs           home  meds.  janumet/ prandin  12/10/2023 a1c at 8.4   continue with sliding scale for now   dvt ppx/  s/q  heparin     f/u pt/ot speech           
78F      h/o  HTN/  HLD/  DM / anemia       presents with intermittent memory loss duration x 2 days,    hence brought in by daughter for evaluation.    pt is  very pleasant   and able  to  recite ,  books  of [employer],  in numerical  order     *  admitted  as  code  stroke/  neuro   dr grove, called by  er  dr     + acute  stroke , on head ct    NIH score 0.  pt   seen  in er       Ct   head,  acute  stroke. R  frontal   infarct       ekg.  nsr       pt is  alert.  has  no  focal  deficits/ normal  speech        tele  monitoring      on asa/ lipitor      permissive  htn  for  initial  24  hours     f/u   MRI  head/  neuro  f/p    f/u    Echo ordered    12/10/2023 await mri /echo and therapy eval all still pending  12/11/2023 mri cw acute right frontal cva   f/u echo   appreciate Pt /OT consults     *  HTN/ HLD -  12/10/2023 will increase lisinopril to 40mg     *  DM          follow   fs           home  meds.  janumet/ prandin  12/10/2023 a1c at 8.4   continue with sliding scale for now   dvt ppx/  s/q  heparin     f/u pt/ot speech           
78F      h/o  HTN/  HLD/  DM / anemia       presents with intermittent memory loss duration x 2 days,    hence brought in by daughter for evaluation.    pt is  very pleasant   and able  to  recite ,  books  of [employer],  in numerical  order     *  admitted  as  code  stroke/  neuro   dr grove, called by  er  dr     + acute  stroke , on head ct    NIH score 0.  pt   seen  in er       Ct   head,  acute  stroke. R  frontal   infarct       ekg.  nsr       pt is  alert.  has  no  focal  deficits/ normal  speech        tele  monitoring      on asa/ lipitor      permissive  htn  for  initial  24  hours     f/u   MRI  head/  neuro  f/p    f/u    Echo ordered    12/10/2023 await mri /echo and therapy eval all still pending  12/11/2023 mri cw acute right frontal cva   f/u echo   appreciate Pt /OT consults  12/12/2023 echo as above     *  HTN/ HLD -  12/10/2023 will increase lisinopril to 40mg  12/12/2023 added norvasc     *  DM          follow   fs           home  meds.  janumet/ prandin  12/10/2023 a1c at 8.4   continue with sliding scale for now   dvt ppx/  s/q  heparin     f/u pt/ot speech-- for outpt services as per their recommendation   per sw daughter says at the last moment unable to take pt home today . will d/c in am           
78F      h/o  HTN/  HLD/  DM / anemia       presents with intermittent memory loss duration x 2 days,    hence brought in by daughter for evaluation.    pt is  very pleasant   and able  to  recite ,  books  of [employer],  in numerical  order     *  admitted  as  code  stroke/  neuro   dr grove, called by  er  dr     + acute  stroke , on head ct    NIH score 0.  pt   seen  in er       Ct   head,  acute  stroke. R  frontal   infarct       ekg.  nsr       pt is  alert.  has  no  focal  deficits/ normal  speech        tele  monitoring      on asa/ lipitor      permissive  htn  for  initial  24  hours     f/u   MRI  head/  neuro  f/p    f/u    Echo ordered     *  HTN/ HLD    *  DM          follow   fs           home  meds.  janumet/ prandin   dvt ppx/  s/q  heparin     f/u pt/ot speech

## 2023-12-12 NOTE — PROGRESS NOTE ADULT - SUBJECTIVE AND OBJECTIVE BOX
Neurology Progress Note    No acute events overnight.     Neuro Exam: AOx3. Follows commands. No dysarthria. No facial droop. PERRL. Moving all four extremities.     MRI brain- acute right frontal stroke  CTA head/neck- no significant stenosis  Echo- normal EF  LDL- 89  HgbA1c- 8.4    A/P:   Acute right frontal stroke  HTN  HLD  DM2    - Aspirin 81mg, Plavix 75mg, and statin daily for secondary stroke prevention  - speech therapy  - discharge planning home with speech therapy.  - I would cancel cruise trip due to recent stroke.   - follow up in our office for follow up.
Patient is a 78y old  Female who presents with a chief complaint of     OVERNIGHT EVENTS:      REVIEW OF SYSTEMS: denies chest pain/SOB, diaphoresis, no F/C, cough, dizziness, headache, blurry vision, nausea, vomiting, abdominal pain. Rest unremarkable     MEDICATIONS  (STANDING):  aspirin  chewable 81 milliGRAM(s) Oral daily  atorvastatin 80 milliGRAM(s) Oral at bedtime  dextrose 5%. 1000 milliLiter(s) (50 mL/Hr) IV Continuous <Continuous>  dextrose 5%. 1000 milliLiter(s) (100 mL/Hr) IV Continuous <Continuous>  dextrose 50% Injectable 25 Gram(s) IV Push once  dextrose 50% Injectable 12.5 Gram(s) IV Push once  dextrose 50% Injectable 25 Gram(s) IV Push once  glucagon  Injectable 1 milliGRAM(s) IntraMuscular once  heparin   Injectable 5000 Unit(s) SubCutaneous every 12 hours  influenza  Vaccine (HIGH DOSE) 0.7 milliLiter(s) IntraMuscular once  insulin lispro (ADMELOG) corrective regimen sliding scale   SubCutaneous three times a day before meals  lisinopril 20 milliGRAM(s) Oral daily    MEDICATIONS  (PRN):  dextrose Oral Gel 15 Gram(s) Oral once PRN Blood Glucose LESS THAN 70 milliGRAM(s)/deciliter      Allergies    Biaxin (Urticaria; Rash)  shellfish (Swelling)    Intolerances        SUBJECTIVE: in bed in NAD, no acute events overnight     T(F): 98.5 (12-09-23 @ 11:05), Max: 98.7 (12-09-23 @ 05:28)  HR: 83 (12-09-23 @ 11:05) (77 - 89)  BP: 138/75 (12-09-23 @ 11:05) (108/93 - 175/94)  RR: 18 (12-09-23 @ 11:05) (14 - 18)  SpO2: 97% (12-09-23 @ 11:05) (96% - 98%)  Wt(kg): --    PHYSICAL EXAM:  GENERAL: NAD, well-groomed, well-developed  HEAD:  Atraumatic, Normocephalic  EYES: EOMI, PERRLA, conjunctiva and sclera clear  ENMT: No tonsillar erythema, exudates, or enlargement; Moist mucous membranes, Good dentition, No lesions  NECK: Supple,   CHEST/LUNG: Clear to  auscultation bilaterally; No rales, rhonchi, wheezing, or rubs  bilaterally  HEART: Regular rate and rhythm; No murmurs, rubs, or gallops  ABDOMEN: Soft, Nontender, Nondistended; Bowel sounds present  EXTREMITIES:  2+ Peripheral Pulses, No clubbing, cyanosis, or edema BL LE  SKIN: No rashes or lesions  NERVOUS SYSTEM:  Alert & Oriented X3, Good concentration; Motor Strength 5/5 B/L upper and lower extremities;   DTRs 2+ intact and symmetric, sensation intact BL    LABS:                        11.7   9.04  )-----------( 173      ( 09 Dec 2023 07:45 )             36.1     12-09    140  |  112<H>  |  21  ----------------------------<  163<H>  3.8   |  24  |  0.90    Ca    9.9      09 Dec 2023 07:45  Mg     1.9     12-08    TPro  8.0  /  Alb  3.6  /  TBili  0.4  /  DBili  x   /  AST  13<L>  /  ALT  17  /  AlkPhos  65  12-08      Urinalysis Basic - ( 09 Dec 2023 07:45 )    Color: x / Appearance: x / SG: x / pH: x  Gluc: 163 mg/dL / Ketone: x  / Bili: x / Urobili: x   Blood: x / Protein: x / Nitrite: x   Leuk Esterase: x / RBC: x / WBC x   Sq Epi: x / Non Sq Epi: x / Bacteria: x      Cultures;   CAPILLARY BLOOD GLUCOSE      POCT Blood Glucose.: 213 mg/dL (09 Dec 2023 13:12)  POCT Blood Glucose.: 200 mg/dL (09 Dec 2023 11:53)  POCT Blood Glucose.: 164 mg/dL (09 Dec 2023 08:23)  POCT Blood Glucose.: 115 mg/dL (08 Dec 2023 22:11)    Lipid panel:   LDL --  TG 90          RADIOLOGY & ADDITIONAL TESTS:  < from: CT Angio Head w/ IV Cont (12.08.23 @ 18:12) >    IMPRESSION:    CTA brain:    No large vessel occlusion.    Numerous skull base and intracranial stenoses as detailed in the body of   the report. Findings are consistent with the presence of atherosclerotic   disease.    No vascular aneurysm. No AVM.    CTA neck:  No flow-limiting stenosis, evidence for arterial dissection, or vascular   aneurysm.    < end of copied text >  < from: CT Angio Head w/ IV Cont (12.08.23 @ 18:12) >  CTA NECK:    Normal flow-related enhancement of the bilateral common and internal   carotid arteries.    Normal flow-related enhancement of the bilateral vertebral arteries.    No flow-limiting stenosis, evidence for arterial dissection, or vascular   aneurysm.    IMPRESSION:    CTA brain:    No large vessel occlusion.    Numerous skull base and intracranial stenoses as detailed in the body of   the report. Findings are consistent with the presence of atherosclerotic   disease.    No vascular aneurysm. No AVM.    CTA neck:  No flow-limiting stenosis, evidence for arterial dissection, or vascular   aneurysm.    < end of copied text >    < from: CT Head No Cont (12.08.23 @ 16:44) >    Acute/subacute right anterolateral frontal infarct.  No CT evidence for hemorrhagic transformation.    Dr. Massey discussed these findings with Dr. Austin on 12/8/2023 4:57 PM   with read back.      < end of copied text >    Imaging Personally Reviewed:  [ x] YES      Consultant(s) Notes Reviewed:  [x ] YES     Care Discussed with [x ] Consultants [X ] Patient [x ] Family  [x ]    [x ]  Other; RN
Patient is a 78y old  Female who presents with a chief complaint of     OVERNIGHT EVENTS:    MEDICATIONS  (STANDING):  amLODIPine   Tablet 10 milliGRAM(s) Oral daily  aspirin  chewable 81 milliGRAM(s) Oral daily  atorvastatin 80 milliGRAM(s) Oral at bedtime  dextrose 5%. 1000 milliLiter(s) (50 mL/Hr) IV Continuous <Continuous>  dextrose 5%. 1000 milliLiter(s) (100 mL/Hr) IV Continuous <Continuous>  dextrose 50% Injectable 25 Gram(s) IV Push once  dextrose 50% Injectable 12.5 Gram(s) IV Push once  dextrose 50% Injectable 25 Gram(s) IV Push once  glucagon  Injectable 1 milliGRAM(s) IntraMuscular once  heparin   Injectable 5000 Unit(s) SubCutaneous every 12 hours  influenza  Vaccine (HIGH DOSE) 0.7 milliLiter(s) IntraMuscular once  insulin lispro (ADMELOG) corrective regimen sliding scale   SubCutaneous three times a day before meals  lisinopril 40 milliGRAM(s) Oral daily    MEDICATIONS  (PRN):  dextrose Oral Gel 15 Gram(s) Oral once PRN Blood Glucose LESS THAN 70 milliGRAM(s)/deciliter      Allergies    Biaxin (Urticaria; Rash)  shellfish (Swelling)    Intolerances        SUBJECTIVE: in bed in NAD, no acute events overnight     Vital Signs Last 24 Hrs  T(C): 36.9 (12 Dec 2023 10:33), Max: 36.9 (11 Dec 2023 23:39)  T(F): 98.5 (12 Dec 2023 10:33), Max: 98.5 (12 Dec 2023 04:45)  HR: 80 (12 Dec 2023 10:33) (67 - 80)  BP: 131/88 (12 Dec 2023 10:33) (131/88 - 172/94)  BP(mean): --  RR: 18 (12 Dec 2023 10:33) (18 - 18)  SpO2: 97% (12 Dec 2023 10:33) (95% - 99%)    Parameters below as of 12 Dec 2023 10:33  Patient On (Oxygen Delivery Method): room air    PHYSICAL EXAM:  GENERAL: NAD, well-groomed, well-developed  HEAD:  Atraumatic, Normocephalic  EYES: EOMI, PERRLA, conjunctiva and sclera clear  ENMT: No tonsillar erythema, exudates, or enlargement; Moist mucous membranes, Good dentition, No lesions  NECK: Supple,   CHEST/LUNG: Clear to  auscultation bilaterally; No rales, rhonchi, wheezing, or rubs  bilaterally  HEART: Regular rate and rhythm; No murmurs, rubs, or gallops  ABDOMEN: Soft, Nontender, Nondistended; Bowel sounds present  EXTREMITIES:  2+ Peripheral Pulses, No clubbing, cyanosis, or edema BL LE  SKIN: No rashes or lesions  NERVOUS SYSTEM:  Alert & Oriented X3, Good concentration; Motor Strength 5/5 B/L upper and lower extremities;   DTRs 2+ intact and symmetric, sensation intact BL    LABS:                   Urinalysis Basic - ( 09 Dec 2023 07:45 )    Color: x / Appearance: x / SG: x / pH: x  Gluc: 163 mg/dL / Ketone: x  / Bili: x / Urobili: x   Blood: x / Protein: x / Nitrite: x   Leuk Esterase: x / RBC: x / WBC x   Sq Epi: x / Non Sq Epi: x / Bacteria: x      Cultures;   CAPILLARY BLOOD GLUCOSE        POCT Blood Glucose.: 218 mg/dL (11 Dec 2023 12:28)  POCT Blood Glucose.: 160 mg/dL (11 Dec 2023 08:24)  POCT Blood Glucose.: 156 mg/dL (10 Dec 2023 21:52)  POCT Blood Glucose.: 133 mg/dL (10 Dec 2023 16:25)    A1C with Estimated Average Glucose (12.09.23 @ 07:45)    A1C with Estimated Average Glucose Result: 8.4: Method: Immunoassay       Reference Range                4.0-5.6%       High risk (prediabetic)        5.7-6.4%       Diabetic, diagnostic             >=6.5%       ADA diabetic treatment goal       <7.0%  The Hemoglobin A1c testing is NGSP-certified.Reference ranges are based  upon the 2010 recommendations of  the American Diabetes Association.  Interpretation may vary for children  and adolescents. %   Estimated Average Glucose: 194: The Estimated Average Glucose (eAG) or Mean Plasma Glucose (MPG) value is  calculated from the hemoglobin A1c value and covers the same time period.   The American Diabetes Association (ADA) and other professional  organizations recommend reporting the eAG with the HgbA1c. mg/dL      Lipid panel:   LDL --  TG 90          RADIOLOGY & ADDITIONAL TESTS:  < from: MR Head No Cont (12.10.23 @ 11:28) >    IMPRESSION: Acute moderate to large right frontal lobe infarction.      < end of copied text >    < from: CT Angio Head w/ IV Cont (12.08.23 @ 18:12) >    IMPRESSION:    CTA brain:    No large vessel occlusion.    Numerous skull base and intracranial stenoses as detailed in the body of   the report. Findings are consistent with the presence of atherosclerotic   disease.    No vascular aneurysm. No AVM.    CTA neck:  No flow-limiting stenosis, evidence for arterial dissection, or vascular   aneurysm.    < end of copied text >  < from: CT Angio Head w/ IV Cont (12.08.23 @ 18:12) >  CTA NECK:    Normal flow-related enhancement of the bilateral common and internal   carotid arteries.    Normal flow-related enhancement of the bilateral vertebral arteries.    No flow-limiting stenosis, evidence for arterial dissection, or vascular   aneurysm.    IMPRESSION:    CTA brain:    No large vessel occlusion.    Numerous skull base and intracranial stenoses as detailed in the body of   the report. Findings are consistent with the presence of atherosclerotic   disease.    No vascular aneurysm. No AVM.    CTA neck:  No flow-limiting stenosis, evidence for arterial dissection, or vascular   aneurysm.    < end of copied text >    < from: CT Head No Cont (12.08.23 @ 16:44) >    Acute/subacute right anterolateral frontal infarct.  No CT evidence for hemorrhagic transformation.    Dr. Massey discussed these findings with Dr. Austin on 12/8/2023 4:57 PM   with read back.      < end of copied text >    Imaging Personally Reviewed:  [ x] YES      Consultant(s) Notes Reviewed:  [x ] YES     Care Discussed with [x ] Consultants [X ] Patient [x ] Family  [x ]    [x ]  Other; RN
Neurology Progress Note    No acute events overnight.     Neuro Exam: AOx3. Follows commands. No dysarthria. No facial droop. PERRL. Moving all four extremities.     MRI brain- acute right frontal stroke  CTA head/neck- no significant stenosis  Echo- pending  LDL- 89  HgbA1c- 8.4    A/P:   Acute right frontal stroke  HTN  HLD  DM2    - Aspirin 81mg, Plavix 75mg, and statin daily for secondary stroke prevention  - Echo pending  - speech therapy  - discharge planning home with speech therapy.
Neurology Progress Note    No acute events overnight.     Neuro Exam: AOx3. Follows commands. No dysarthria. No facial droop. PERRL. Moving all four extremities.     MRI brain- acute right frontal stroke  CTA head/neck- no significant stenosis  Echo- pending  LDL- 89  HgbA1c- 8.4    A/P:   Acute right frontal stroke  HTN  HLD  DM2    - Aspirin 81mg, Plavix 75mg, and statin daily for secondary stroke prevention  - Echo pending  - speech therapy  - discharge planning home with speech therapy.
Patient is a 78y old  Female who presents with a chief complaint of     OVERNIGHT EVENTS:    MEDICATIONS  (STANDING):  aspirin  chewable 81 milliGRAM(s) Oral daily  atorvastatin 80 milliGRAM(s) Oral at bedtime  dextrose 5%. 1000 milliLiter(s) (100 mL/Hr) IV Continuous <Continuous>  dextrose 5%. 1000 milliLiter(s) (50 mL/Hr) IV Continuous <Continuous>  dextrose 50% Injectable 25 Gram(s) IV Push once  dextrose 50% Injectable 12.5 Gram(s) IV Push once  dextrose 50% Injectable 25 Gram(s) IV Push once  glucagon  Injectable 1 milliGRAM(s) IntraMuscular once  heparin   Injectable 5000 Unit(s) SubCutaneous every 12 hours  influenza  Vaccine (HIGH DOSE) 0.7 milliLiter(s) IntraMuscular once  insulin lispro (ADMELOG) corrective regimen sliding scale   SubCutaneous three times a day before meals  lisinopril 20 milliGRAM(s) Oral daily    MEDICATIONS  (PRN):  dextrose Oral Gel 15 Gram(s) Oral once PRN Blood Glucose LESS THAN 70 milliGRAM(s)/deciliter      Allergies    Biaxin (Urticaria; Rash)  shellfish (Swelling)    Intolerances        SUBJECTIVE: in bed in NAD, no acute events overnight       Vital Signs Last 24 Hrs  T(C): 36.8 (10 Dec 2023 10:15), Max: 37.1 (09 Dec 2023 16:20)  T(F): 98.3 (10 Dec 2023 10:15), Max: 98.8 (09 Dec 2023 16:20)  HR: 85 (10 Dec 2023 10:15) (77 - 85)  BP: 139/71 (10 Dec 2023 10:15) (131/80 - 150/83)  BP(mean): --  RR: 18 (10 Dec 2023 10:15) (18 - 18)  SpO2: 97% (10 Dec 2023 10:15) (97% - 98%)    Parameters below as of 10 Dec 2023 10:15  Patient On (Oxygen Delivery Method): room air      PHYSICAL EXAM:  GENERAL: NAD, well-groomed, well-developed  HEAD:  Atraumatic, Normocephalic  EYES: EOMI, PERRLA, conjunctiva and sclera clear  ENMT: No tonsillar erythema, exudates, or enlargement; Moist mucous membranes, Good dentition, No lesions  NECK: Supple,   CHEST/LUNG: Clear to  auscultation bilaterally; No rales, rhonchi, wheezing, or rubs  bilaterally  HEART: Regular rate and rhythm; No murmurs, rubs, or gallops  ABDOMEN: Soft, Nontender, Nondistended; Bowel sounds present  EXTREMITIES:  2+ Peripheral Pulses, No clubbing, cyanosis, or edema BL LE  SKIN: No rashes or lesions  NERVOUS SYSTEM:  Alert & Oriented X3, Good concentration; Motor Strength 5/5 B/L upper and lower extremities;   DTRs 2+ intact and symmetric, sensation intact BL    LABS:                                              11.7   9.04  )-----------( 173      ( 09 Dec 2023 07:45 )             36.1   12-09    140  |  112<H>  |  21  ----------------------------<  163<H>  3.8   |  24  |  0.90    Ca    9.9      09 Dec 2023 07:45  Mg     1.9     12-08    TPro  8.0  /  Alb  3.6  /  TBili  0.4  /  DBili  x   /  AST  13<L>  /  ALT  17  /  AlkPhos  65  12-08    Urinalysis Basic - ( 09 Dec 2023 07:45 )    Color: x / Appearance: x / SG: x / pH: x  Gluc: 163 mg/dL / Ketone: x  / Bili: x / Urobili: x   Blood: x / Protein: x / Nitrite: x   Leuk Esterase: x / RBC: x / WBC x   Sq Epi: x / Non Sq Epi: x / Bacteria: x      Cultures;   CAPILLARY BLOOD GLUCOSE    CAPILLARY BLOOD GLUCOSE      POCT Blood Glucose.: 172 mg/dL (10 Dec 2023 08:22)  POCT Blood Glucose.: 183 mg/dL (09 Dec 2023 22:21)  POCT Blood Glucose.: 132 mg/dL (09 Dec 2023 17:08)  POCT Blood Glucose.: 213 mg/dL (09 Dec 2023 13:12)      A1C with Estimated Average Glucose (12.09.23 @ 07:45)    A1C with Estimated Average Glucose Result: 8.4: Method: Immunoassay       Reference Range                4.0-5.6%       High risk (prediabetic)        5.7-6.4%       Diabetic, diagnostic             >=6.5%       ADA diabetic treatment goal       <7.0%  The Hemoglobin A1c testing is NGSP-certified.Reference ranges are based  upon the 2010 recommendations of  the American Diabetes Association.  Interpretation may vary for children  and adolescents. %   Estimated Average Glucose: 194: The Estimated Average Glucose (eAG) or Mean Plasma Glucose (MPG) value is  calculated from the hemoglobin A1c value and covers the same time period.   The American Diabetes Association (ADA) and other professional  organizations recommend reporting the eAG with the HgbA1c. mg/dL      Lipid panel:   LDL --  TG 90          RADIOLOGY & ADDITIONAL TESTS:  < from: CT Angio Head w/ IV Cont (12.08.23 @ 18:12) >    IMPRESSION:    CTA brain:    No large vessel occlusion.    Numerous skull base and intracranial stenoses as detailed in the body of   the report. Findings are consistent with the presence of atherosclerotic   disease.    No vascular aneurysm. No AVM.    CTA neck:  No flow-limiting stenosis, evidence for arterial dissection, or vascular   aneurysm.    < end of copied text >  < from: CT Angio Head w/ IV Cont (12.08.23 @ 18:12) >  CTA NECK:    Normal flow-related enhancement of the bilateral common and internal   carotid arteries.    Normal flow-related enhancement of the bilateral vertebral arteries.    No flow-limiting stenosis, evidence for arterial dissection, or vascular   aneurysm.    IMPRESSION:    CTA brain:    No large vessel occlusion.    Numerous skull base and intracranial stenoses as detailed in the body of   the report. Findings are consistent with the presence of atherosclerotic   disease.    No vascular aneurysm. No AVM.    CTA neck:  No flow-limiting stenosis, evidence for arterial dissection, or vascular   aneurysm.    < end of copied text >    < from: CT Head No Cont (12.08.23 @ 16:44) >    Acute/subacute right anterolateral frontal infarct.  No CT evidence for hemorrhagic transformation.    Dr. Massey discussed these findings with Dr. Austin on 12/8/2023 4:57 PM   with read back.      < end of copied text >    Imaging Personally Reviewed:  [ x] YES      Consultant(s) Notes Reviewed:  [x ] YES     Care Discussed with [x ] Consultants [X ] Patient [x ] Family  [x ]    [x ]  Other; RN
Patient is a 78y old  Female who presents with a chief complaint of     OVERNIGHT EVENTS:    MEDICATIONS  (STANDING):  aspirin  chewable 81 milliGRAM(s) Oral daily  atorvastatin 80 milliGRAM(s) Oral at bedtime  dextrose 5%. 1000 milliLiter(s) (50 mL/Hr) IV Continuous <Continuous>  dextrose 5%. 1000 milliLiter(s) (100 mL/Hr) IV Continuous <Continuous>  dextrose 50% Injectable 25 Gram(s) IV Push once  dextrose 50% Injectable 12.5 Gram(s) IV Push once  dextrose 50% Injectable 25 Gram(s) IV Push once  glucagon  Injectable 1 milliGRAM(s) IntraMuscular once  heparin   Injectable 5000 Unit(s) SubCutaneous every 12 hours  influenza  Vaccine (HIGH DOSE) 0.7 milliLiter(s) IntraMuscular once  insulin lispro (ADMELOG) corrective regimen sliding scale   SubCutaneous three times a day before meals    MEDICATIONS  (PRN):  dextrose Oral Gel 15 Gram(s) Oral once PRN Blood Glucose LESS THAN 70 milliGRAM(s)/deciliter      Allergies    Biaxin (Urticaria; Rash)  shellfish (Swelling)    Intolerances        SUBJECTIVE: in bed in NAD, no acute events overnight       \  Vital Signs Last 24 Hrs  T(C): 36.8 (11 Dec 2023 04:23), Max: 37 (10 Dec 2023 16:51)  T(F): 98.3 (11 Dec 2023 04:23), Max: 98.6 (10 Dec 2023 16:51)  HR: 77 (11 Dec 2023 09:00) (73 - 85)  BP: 155/81 (11 Dec 2023 09:00) (140/76 - 162/83)  BP(mean): --  RR: 18 (11 Dec 2023 04:23) (18 - 18)  SpO2: 97% (11 Dec 2023 09:00) (95% - 98%)    Parameters below as of 11 Dec 2023 09:00  Patient On (Oxygen Delivery Method): room air        PHYSICAL EXAM:  GENERAL: NAD, well-groomed, well-developed  HEAD:  Atraumatic, Normocephalic  EYES: EOMI, PERRLA, conjunctiva and sclera clear  ENMT: No tonsillar erythema, exudates, or enlargement; Moist mucous membranes, Good dentition, No lesions  NECK: Supple,   CHEST/LUNG: Clear to  auscultation bilaterally; No rales, rhonchi, wheezing, or rubs  bilaterally  HEART: Regular rate and rhythm; No murmurs, rubs, or gallops  ABDOMEN: Soft, Nontender, Nondistended; Bowel sounds present  EXTREMITIES:  2+ Peripheral Pulses, No clubbing, cyanosis, or edema BL LE  SKIN: No rashes or lesions  NERVOUS SYSTEM:  Alert & Oriented X3, Good concentration; Motor Strength 5/5 B/L upper and lower extremities;   DTRs 2+ intact and symmetric, sensation intact BL    LABS:                   Urinalysis Basic - ( 09 Dec 2023 07:45 )    Color: x / Appearance: x / SG: x / pH: x  Gluc: 163 mg/dL / Ketone: x  / Bili: x / Urobili: x   Blood: x / Protein: x / Nitrite: x   Leuk Esterase: x / RBC: x / WBC x   Sq Epi: x / Non Sq Epi: x / Bacteria: x      Cultures;   CAPILLARY BLOOD GLUCOSE        POCT Blood Glucose.: 218 mg/dL (11 Dec 2023 12:28)  POCT Blood Glucose.: 160 mg/dL (11 Dec 2023 08:24)  POCT Blood Glucose.: 156 mg/dL (10 Dec 2023 21:52)  POCT Blood Glucose.: 133 mg/dL (10 Dec 2023 16:25)    A1C with Estimated Average Glucose (12.09.23 @ 07:45)    A1C with Estimated Average Glucose Result: 8.4: Method: Immunoassay       Reference Range                4.0-5.6%       High risk (prediabetic)        5.7-6.4%       Diabetic, diagnostic             >=6.5%       ADA diabetic treatment goal       <7.0%  The Hemoglobin A1c testing is NGSP-certified.Reference ranges are based  upon the 2010 recommendations of  the American Diabetes Association.  Interpretation may vary for children  and adolescents. %   Estimated Average Glucose: 194: The Estimated Average Glucose (eAG) or Mean Plasma Glucose (MPG) value is  calculated from the hemoglobin A1c value and covers the same time period.   The American Diabetes Association (ADA) and other professional  organizations recommend reporting the eAG with the HgbA1c. mg/dL      Lipid panel:   LDL --  TG 90          RADIOLOGY & ADDITIONAL TESTS:  < from: MR Head No Cont (12.10.23 @ 11:28) >    IMPRESSION: Acute moderate to large right frontal lobe infarction.      < end of copied text >    < from: CT Angio Head w/ IV Cont (12.08.23 @ 18:12) >    IMPRESSION:    CTA brain:    No large vessel occlusion.    Numerous skull base and intracranial stenoses as detailed in the body of   the report. Findings are consistent with the presence of atherosclerotic   disease.    No vascular aneurysm. No AVM.    CTA neck:  No flow-limiting stenosis, evidence for arterial dissection, or vascular   aneurysm.    < end of copied text >  < from: CT Angio Head w/ IV Cont (12.08.23 @ 18:12) >  CTA NECK:    Normal flow-related enhancement of the bilateral common and internal   carotid arteries.    Normal flow-related enhancement of the bilateral vertebral arteries.    No flow-limiting stenosis, evidence for arterial dissection, or vascular   aneurysm.    IMPRESSION:    CTA brain:    No large vessel occlusion.    Numerous skull base and intracranial stenoses as detailed in the body of   the report. Findings are consistent with the presence of atherosclerotic   disease.    No vascular aneurysm. No AVM.    CTA neck:  No flow-limiting stenosis, evidence for arterial dissection, or vascular   aneurysm.    < end of copied text >    < from: CT Head No Cont (12.08.23 @ 16:44) >    Acute/subacute right anterolateral frontal infarct.  No CT evidence for hemorrhagic transformation.    Dr. Massey discussed these findings with Dr. Austin on 12/8/2023 4:57 PM   with read back.      < end of copied text >    Imaging Personally Reviewed:  [ x] YES      Consultant(s) Notes Reviewed:  [x ] YES     Care Discussed with [x ] Consultants [X ] Patient [x ] Family  [x ]    [x ]  Other; RN

## 2023-12-12 NOTE — DISCHARGE NOTE PROVIDER - HOSPITAL COURSE
78 year old woman with hx of HTN, HLD, and DM presenting with cognitive problem since 12/7/23. Patient woke up with symptoms and her family noticed something was not right. LKW 12/6/23. She was not processing her thoughts normally. She would perseverate. No facial droop or focal weakness. No history of stroke. CT head shows right frontal stroke. CTA head/neck showed "Numerous skull base and intracranial stenoses".       * Admitted  as  code  stroke  Neuro   Dr Nickerson, called by ED  + acute  stroke , on head ct   NIH score 0.  pt   seen  in er  Ct   head,  acute  stroke. R  frontal   infarct  ekg.  nsr  pt is  alert.  has  no  focal  deficits/ normal  speech   tele  monitoring  on asa/ lipitor  permissive  htn  for  initial  24  hours  12/10/2023 await mri /echo and therapy eval all still pending  12/11/2023 mri cw acute right frontal cva  appreciate Pt /OT consults      #MR Head IMPRESSION  Acute moderate to large right frontal lobe infarction.    #TTE Echo Complete w/o Contrast w/ Doppler  Summary:   1. Normal global left ventricular systolic function.   2. Mild mitral annular calcification.   3. Mild mitral valve regurgitation.   4. Mild thickening of the anterior and posterior mitral valve leaflets.   5. Moderate pulmonic valve regurgitation.   6. Color flow doppler and intravenous injection of agitated saline demonstrates the presence of an intact intra atrial septum.   7. No evidence of any thrombus.           *  HTN/ HLD   12/10/2023 will increase lisinopril to 40mg     *  DM  follow   fs  home  meds.  janumet/ prandin  12/10/2023 a1c at 8.4  continue with sliding scale for now    F/u pt/ot, speech and Dr. Schneider as outpatient    Case discussed with attending.  Given patient's improved clinical status and current hemodynamic stability, the decision was made for discharge.    This is a summary of the patients hospitalization.  For full hospital course, please see medical record. 78 year old woman with hx of HTN, HLD, and DM presenting with cognitive problem since 12/7/23. Patient woke up with symptoms and her family noticed something was not right. LKW 12/6/23. She was not processing her thoughts normally. She would perseverate. No facial droop or focal weakness. No history of stroke. CT head shows right frontal stroke. CTA head/neck showed "Numerous skull base and intracranial stenoses".       * Admitted  as  code  stroke  Neuro   Dr Nickerson, called by ED  + acute  stroke , on head ct   NIH score 0.  pt   seen  in er  Ct   head,  acute  stroke. R  frontal   infarct  ekg.  nsr  pt is  alert.  has  no  focal  deficits/ normal  speech   tele  monitoring  on asa/ lipitor  permissive  htn  for  initial  24  hours  12/10/2023 await mri /echo and therapy eval all still pending  12/11/2023 mri cw acute right frontal cva  appreciate Pt /OT consults      #MR Head IMPRESSION  Acute moderate to large right frontal lobe infarction.   no telemetry events    #TTE Echo Complete w/o Contrast w/ Doppler  Summary:   1. Normal global left ventricular systolic function.   2. Mild mitral annular calcification.   3. Mild mitral valve regurgitation.   4. Mild thickening of the anterior and posterior mitral valve leaflets.   5. Moderate pulmonic valve regurgitation.   6. Color flow doppler and intravenous injection of agitated saline demonstrates the presence of an intact intra atrial septum.   7. No evidence of any thrombus.           *  HTN/ HLD   12/10/2023 will increase lisinopril to 40mg     *  DM  follow   fs  home  meds.  janumet/ prandin  12/10/2023 a1c at 8.4  continue with sliding scale for now    F/u pt/ot, speech and Dr. Schneider as outpatient    Case discussed with attending.  Given patient's improved clinical status and current hemodynamic stability, the decision was made for discharge.    This is a summary of the patients hospitalization.  For full hospital course, please see medical record. 78 year old woman with hx of HTN, HLD, and DM presenting with cognitive problem since 12/7/23. Patient woke up with symptoms and her family noticed something was not right. LKW 12/6/23. She was not processing her thoughts normally. She would perseverate. No facial droop or focal weakness. No history of stroke. CT head shows right frontal stroke. CTA head/neck showed "Numerous skull base and intracranial stenoses".       * Admitted  as  code  stroke  Neuro   Dr Nickerson, called by ED  + acute  stroke , on head ct   Ct   head,  acute  stroke. R  frontal   infarct  ekg.  nsr  pt is  alert.  has  no  focal  deficits/ normal  speech   tele  monitoring  on asa/ lipitor  12/11/2023 mri cw acute right frontal cva  appreciate Pt /OT consults      #MR Head IMPRESSION  Acute moderate to large right frontal lobe infarction.   no telemetry events    #TTE Echo Complete w/o Contrast w/ Doppler  Summary:   1. Normal global left ventricular systolic function.   2. Mild mitral annular calcification.   3. Mild mitral valve regurgitation.   4. Mild thickening of the anterior and posterior mitral valve leaflets.   5. Moderate pulmonic valve regurgitation.   6. Color flow doppler and intravenous injection of agitated saline demonstrates the presence of an intact intra atrial septum.   7. No evidence of any thrombus.           *  HTN/ HLD   12/10/2023 will increase lisinopril to 40mg     *  DM  follow   fs  home  meds.  janumet/ prandin  12/10/2023 a1c at 8.4  continue with sliding scale for now    F/u pt/ot, speech and Dr. Schneider as outpatient    Case discussed with attending.  Given patient's improved clinical status and current hemodynamic stability, the decision was made for discharge.    This is a summary of the patients hospitalization.  For full hospital course, please see medical record.

## 2023-12-13 ENCOUNTER — TRANSCRIPTION ENCOUNTER (OUTPATIENT)
Age: 78
End: 2023-12-13

## 2023-12-13 VITALS
DIASTOLIC BLOOD PRESSURE: 75 MMHG | OXYGEN SATURATION: 97 % | HEART RATE: 80 BPM | TEMPERATURE: 98 F | RESPIRATION RATE: 18 BRPM | SYSTOLIC BLOOD PRESSURE: 144 MMHG

## 2023-12-13 LAB
GLUCOSE BLDC GLUCOMTR-MCNC: 188 MG/DL — HIGH (ref 70–99)
GLUCOSE BLDC GLUCOMTR-MCNC: 188 MG/DL — HIGH (ref 70–99)
GLUCOSE BLDC GLUCOMTR-MCNC: 306 MG/DL — HIGH (ref 70–99)
GLUCOSE BLDC GLUCOMTR-MCNC: 306 MG/DL — HIGH (ref 70–99)

## 2023-12-13 PROCEDURE — 99239 HOSP IP/OBS DSCHRG MGMT >30: CPT

## 2023-12-13 RX ADMIN — LISINOPRIL 40 MILLIGRAM(S): 2.5 TABLET ORAL at 05:04

## 2023-12-13 RX ADMIN — HEPARIN SODIUM 5000 UNIT(S): 5000 INJECTION INTRAVENOUS; SUBCUTANEOUS at 05:06

## 2023-12-13 RX ADMIN — Medication 81 MILLIGRAM(S): at 12:15

## 2023-12-13 RX ADMIN — Medication 4: at 12:15

## 2023-12-13 RX ADMIN — Medication 1: at 08:53

## 2023-12-13 RX ADMIN — AMLODIPINE BESYLATE 10 MILLIGRAM(S): 2.5 TABLET ORAL at 05:05

## 2023-12-13 NOTE — DISCHARGE NOTE NURSING/CASE MANAGEMENT/SOCIAL WORK - PATIENT PORTAL LINK FT
You can access the FollowMyHealth Patient Portal offered by [facility] by registering at the following website: [email or web address]. By joining [facility]’s FollowMyHealth portal, you will also be able to view your health information using other applications (apps) compatible with our system. You can access the FollowMyHealth Patient Portal offered by [facility] by registering at the following website: [email or web address]. By joining [facility]’s FollowMyHealth portal, you will also be able to view your health information using other applications (apps) compatible with our system.

## 2023-12-13 NOTE — DISCHARGE NOTE NURSING/CASE MANAGEMENT/SOCIAL WORK - NSDCPEFALRISK_GEN_ALL_CORE
For information on Fall & Injury Prevention, visit: [email or web address] OR  [email or web address] OR  https://www.cdc.gov/steadi/patient.html For information on Fall & Injury Prevention, visit: [email or web address] OR  [email or web address] OR  https://www.cdc.gov/steadi/patient.html

## 2023-12-18 ENCOUNTER — APPOINTMENT (OUTPATIENT)
Dept: ULTRASOUND IMAGING | Facility: IMAGING CENTER | Age: 78
End: 2023-12-18

## 2023-12-18 DIAGNOSIS — I63.9 CEREBRAL INFARCTION, UNSPECIFIED: ICD-10-CM

## 2023-12-18 DIAGNOSIS — R29.700 NIHSS SCORE 0: ICD-10-CM

## 2023-12-18 DIAGNOSIS — E78.5 HYPERLIPIDEMIA, UNSPECIFIED: ICD-10-CM

## 2023-12-18 DIAGNOSIS — I10 ESSENTIAL (PRIMARY) HYPERTENSION: ICD-10-CM

## 2023-12-18 DIAGNOSIS — E11.9 TYPE 2 DIABETES MELLITUS WITHOUT COMPLICATIONS: ICD-10-CM

## 2024-01-09 ENCOUNTER — APPOINTMENT (OUTPATIENT)
Dept: ENDOCRINOLOGY | Facility: CLINIC | Age: 79
End: 2024-01-09

## 2024-01-30 ENCOUNTER — APPOINTMENT (OUTPATIENT)
Dept: ENDOCRINOLOGY | Facility: CLINIC | Age: 79
End: 2024-01-30
Payer: MEDICARE

## 2024-01-30 VITALS
DIASTOLIC BLOOD PRESSURE: 80 MMHG | OXYGEN SATURATION: 97 % | RESPIRATION RATE: 16 BRPM | WEIGHT: 156 LBS | BODY MASS INDEX: 25.99 KG/M2 | HEIGHT: 65 IN | TEMPERATURE: 98.4 F | SYSTOLIC BLOOD PRESSURE: 113 MMHG | HEART RATE: 95 BPM

## 2024-01-30 LAB — GLUCOSE BLDC GLUCOMTR-MCNC: 133

## 2024-01-30 PROCEDURE — G2211 COMPLEX E/M VISIT ADD ON: CPT

## 2024-01-30 PROCEDURE — 95251 CONT GLUC MNTR ANALYSIS I&R: CPT

## 2024-01-30 PROCEDURE — 99214 OFFICE O/P EST MOD 30 MIN: CPT | Mod: 25

## 2024-01-30 PROCEDURE — 82962 GLUCOSE BLOOD TEST: CPT

## 2024-01-30 RX ORDER — EMPAGLIFLOZIN 10 MG/1
10 TABLET, FILM COATED ORAL
Qty: 30 | Refills: 6 | Status: ACTIVE | COMMUNITY
Start: 2024-01-30 | End: 1900-01-01

## 2024-01-30 NOTE — ASSESSMENT
[Diabetes Foot Care] : diabetes foot care [Long Term Vascular Complications] : long term vascular complications of diabetes [Carbohydrate Consistent Diet] : carbohydrate consistent diet [Importance of Diet and Exercise] : importance of diet and exercise to improve glycemic control, achieve weight loss and improve cardiovascular health [Exercise/Effect on Glucose] : exercise/effect on glucose [Hypoglycemia Management] : hypoglycemia management [Glucagon Use] : glucagon use [Self Monitoring of Blood Glucose] : self monitoring of blood glucose [Retinopathy Screening] : Patient was referred to ophthalmology for retinopathy screening [Diabetic Medications] : Risks and benefits of diabetic medications were discussed [FreeTextEntry1] : 1. T2DM, uncontrolled - very poor adherence - compliance is reiterated - Janumet 50-1000mg BID,  - retry Jardiance 10 mg qd (R+B). hopefully the coverage has improved - change Prandin 1 mg ac lunch only - written instructions are given - switch to Alice 3 - Hydration !!! - crestor 20mg HS - monitor BP, urine microalbumin  2. Hypercalcemia - repeat a 24hrs U calcium. If still with underexcretion of Ca, will send for CASR  3. Osteoporosis - DXA 3 sites in 03/25 - on Fosamax since 09/2021 - to take OTC vitamin D3 1,000 IU/day - continue weight-bearing exercises; advised avoiding high risk sports - routine dental evaluation advised. - She's not a candidate for rh-PTH therapy b/o hyperparathyroidism.  4. MNG - Rpt Thyroid US to f/u on b/l cysts  RTC post tests.

## 2024-01-30 NOTE — HISTORY OF PRESENT ILLNESS
[FreeTextEntry1] : Patient is here  for multiple medical issues  *** Jan 30, 2024 ***  admitted last month to [facility] with acute stroke labs at that time- a1c- 8.4, ca- 9.9-10.5 most recent calcium 11.2 states that collected a 24h Uca, but "it was lost" missed her sono appt  taking  Janumet 50-1000mg BID, Prandin 1 mg ac B+D (states that hs been taking am dose "as soon as she gets up, but not before breakfast"), Lisinopril 40 mg qd (dose increased), amlodipine , Crestor 20 mg HS, Fosamax 70 mg qw (started in 09/21), daily OTC vitamin D3 wearing Alice  Date of download:  01/30/2024  Diabetes Medications and Dosage: as above Indication for CGMS: verify a change in the treatment regimen, identify periods of hypoglycemia/ hyperglycemia.  Modal day report: pattern.  Pt with HYPO  5% of the time ( NONE below 54),  72% in target range Hyperglycemia:  23% elevation  Identified issues: carbohydrate counting, spiking post lunch dates analyzed:  01/17/2024- 01/30/2024  *** Sep 28, 2023 ***  taking Janumet 50-1000mg BID, Prandin 1 mg ac B+D (skips sometimes the 2nd dose), Lisinopril 20 mg qd, Fosamax 70 mg qw (started in 09/21) not taking crestor has difficulties operating/ scanning Alice 2 not scanning recently and is not checking FS today p/B- 180 (did not take Prandin) labs from 8/23- a1c- 7.2, LDL- 191, ca- 11.1  DXA(3/4/23)- L2-L3 (-2.3),  LFN (-3.3) did not collect 24h Uca and no thyroid sono yet  *** May 01, 2023 ***  last seen in 2019 reconsulted for diabetic and HPT management no recent labs. Thinks she had DXA recently, but is not aware of the results taking Metformin 1000 mg qd only, Lisinopril 20 mg qd, Fosamax 70 mg qw (started in 09/21) thinks that also takes "another diabetic medication", but not sure exactly which one. She does not know whether she's taking statins  Parathyroid scan (9/7/19)- parathyroid lesion posterior to RLP and LUP. ? possible 3rd lesion inferior to the LLP  *** July 19, 2019 ***  On metformin 1g bid states that  jardiance and onglyza are not covered.  stopped checking BS at home today ppg in the office- 143 did not do a parathyroid scan yet Thyr US (5/2/19)- b/l colloid cysts. No parathyroid adenoma DXA (5/2/19)- severe kyphosis with L1(-2.7), L2 (-3.9), FN (-2.8), radius 33% (-2.6)  ca- 11.1 <-- 11.2, PTH- 95, 25D- 23.8 24h Uca- 59 Ca/creat ratio- 0.004  HISTORY OF PRESENT ILLNESS.   Ms. BARBER was diagnosed with Diabetes Mellitus Type 2 in 2014  Denies known complications of retinopathy, nephropathy, or neuropathy.  Reports history HTN, dyslipidemia. Denies CAD.  Presently on metformin 1g qd to bid, lisinopril 20mg qd. She's been previously on Januvia, but stopped because of GI distress Blood sugars are checked once a day.   Hypoglycemia frequency: none Diet:not following ADA.  Exercise: none  Lab review: a1c- 8.9, ca- 10.7, LDL-  177, creatinine  0.82  Cancer history: none Calcium supplementation: none Fracture history: traumatic left shoulder, right arm fractures Bone disease risk factors: no history of liver disease, kidney disease, thyroid disease, anticonvulsant use, glucocorticoid use, autoimmune disorders such as rheumatoid arthritis and SLE, COPD, IBD, known malabsorption disorders, or weight loss.  Family history is negative for calcium disorders, nephrolithiasis, pancreatitis and tumors.  Currently, no symptoms of polyuria, polydipsia, constipation, abdominal pain, mental confusion, depression, bone pain or fractures.   ****  Last dilated eye - 02/23 Last podiatry visit  - 2018 Last cardiology evaluation - several years ago Last stress test - several years ago Last 2-D Echo - several years ago Last nephrology evaluation - none Last neurology evaluation- none

## 2024-03-21 ENCOUNTER — APPOINTMENT (OUTPATIENT)
Dept: ENDOCRINOLOGY | Facility: CLINIC | Age: 79
End: 2024-03-21

## 2024-03-22 ENCOUNTER — APPOINTMENT (OUTPATIENT)
Dept: ENDOCRINOLOGY | Facility: CLINIC | Age: 79
End: 2024-03-22

## 2024-04-17 ENCOUNTER — NON-APPOINTMENT (OUTPATIENT)
Age: 79
End: 2024-04-17

## 2024-04-24 ENCOUNTER — INPATIENT (INPATIENT)
Facility: HOSPITAL | Age: 79
LOS: 4 days | Discharge: HOME HEALTH | End: 2024-04-29
Attending: INTERNAL MEDICINE | Admitting: INTERNAL MEDICINE
Payer: MEDICARE

## 2024-04-24 VITALS
WEIGHT: 139.99 LBS | TEMPERATURE: 98 F | OXYGEN SATURATION: 97 % | HEIGHT: 64 IN | DIASTOLIC BLOOD PRESSURE: 87 MMHG | RESPIRATION RATE: 20 BRPM | HEART RATE: 109 BPM | SYSTOLIC BLOOD PRESSURE: 150 MMHG

## 2024-04-24 DIAGNOSIS — E78.5 HYPERLIPIDEMIA, UNSPECIFIED: ICD-10-CM

## 2024-04-24 DIAGNOSIS — Z79.84 LONG TERM (CURRENT) USE OF ORAL HYPOGLYCEMIC DRUGS: ICD-10-CM

## 2024-04-24 DIAGNOSIS — Z91.013 ALLERGY TO SEAFOOD: ICD-10-CM

## 2024-04-24 DIAGNOSIS — R55 SYNCOPE AND COLLAPSE: ICD-10-CM

## 2024-04-24 DIAGNOSIS — R41.82 ALTERED MENTAL STATUS, UNSPECIFIED: ICD-10-CM

## 2024-04-24 DIAGNOSIS — E04.1 NONTOXIC SINGLE THYROID NODULE: ICD-10-CM

## 2024-04-24 DIAGNOSIS — Z86.73 PERSONAL HISTORY OF TRANSIENT ISCHEMIC ATTACK (TIA), AND CEREBRAL INFARCTION WITHOUT RESIDUAL DEFICITS: ICD-10-CM

## 2024-04-24 DIAGNOSIS — Z88.1 ALLERGY STATUS TO OTHER ANTIBIOTIC AGENTS STATUS: ICD-10-CM

## 2024-04-24 DIAGNOSIS — I69.354 HEMIPLEGIA AND HEMIPARESIS FOLLOWING CEREBRAL INFARCTION AFFECTING LEFT NON-DOMINANT SIDE: ICD-10-CM

## 2024-04-24 DIAGNOSIS — I10 ESSENTIAL (PRIMARY) HYPERTENSION: ICD-10-CM

## 2024-04-24 DIAGNOSIS — E11.8 TYPE 2 DIABETES MELLITUS WITH UNSPECIFIED COMPLICATIONS: ICD-10-CM

## 2024-04-24 DIAGNOSIS — Z11.52 ENCOUNTER FOR SCREENING FOR COVID-19: ICD-10-CM

## 2024-04-24 DIAGNOSIS — Z79.82 LONG TERM (CURRENT) USE OF ASPIRIN: ICD-10-CM

## 2024-04-24 DIAGNOSIS — E11.9 TYPE 2 DIABETES MELLITUS WITHOUT COMPLICATIONS: ICD-10-CM

## 2024-04-24 DIAGNOSIS — I67.2 CEREBRAL ATHEROSCLEROSIS: ICD-10-CM

## 2024-04-24 DIAGNOSIS — G40.209 LOCALIZATION-RELATED (FOCAL) (PARTIAL) SYMPTOMATIC EPILEPSY AND EPILEPTIC SYNDROMES WITH COMPLEX PARTIAL SEIZURES, NOT INTRACTABLE, WITHOUT STATUS EPILEPTICUS: ICD-10-CM

## 2024-04-24 DIAGNOSIS — E21.0 PRIMARY HYPERPARATHYROIDISM: ICD-10-CM

## 2024-04-24 LAB
ALBUMIN SERPL ELPH-MCNC: 3.8 G/DL — SIGNIFICANT CHANGE UP (ref 3.3–5)
ALP SERPL-CCNC: 75 U/L — SIGNIFICANT CHANGE UP (ref 40–120)
ALT FLD-CCNC: 21 U/L — SIGNIFICANT CHANGE UP (ref 12–78)
ANION GAP SERPL CALC-SCNC: 9 MMOL/L — SIGNIFICANT CHANGE UP (ref 5–17)
APPEARANCE UR: CLEAR — SIGNIFICANT CHANGE UP
APTT BLD: 29.2 SEC — SIGNIFICANT CHANGE UP (ref 24.5–35.6)
AST SERPL-CCNC: 22 U/L — SIGNIFICANT CHANGE UP (ref 15–37)
BASOPHILS # BLD AUTO: 0.08 K/UL — SIGNIFICANT CHANGE UP (ref 0–0.2)
BASOPHILS NFR BLD AUTO: 0.8 % — SIGNIFICANT CHANGE UP (ref 0–2)
BILIRUB SERPL-MCNC: 0.6 MG/DL — SIGNIFICANT CHANGE UP (ref 0.2–1.2)
BILIRUB UR-MCNC: NEGATIVE — SIGNIFICANT CHANGE UP
BLD GP AB SCN SERPL QL: SIGNIFICANT CHANGE UP
BUN SERPL-MCNC: 11 MG/DL — SIGNIFICANT CHANGE UP (ref 7–23)
CALCIUM SERPL-MCNC: 10.3 MG/DL — HIGH (ref 8.5–10.1)
CHLORIDE SERPL-SCNC: 110 MMOL/L — HIGH (ref 96–108)
CO2 SERPL-SCNC: 21 MMOL/L — LOW (ref 22–31)
COLOR SPEC: YELLOW — SIGNIFICANT CHANGE UP
CREAT SERPL-MCNC: 0.95 MG/DL — SIGNIFICANT CHANGE UP (ref 0.5–1.3)
DIFF PNL FLD: NEGATIVE — SIGNIFICANT CHANGE UP
EGFR: 62 ML/MIN/1.73M2 — SIGNIFICANT CHANGE UP
EOSINOPHIL # BLD AUTO: 0.2 K/UL — SIGNIFICANT CHANGE UP (ref 0–0.5)
EOSINOPHIL NFR BLD AUTO: 2 % — SIGNIFICANT CHANGE UP (ref 0–6)
FLUAV AG NPH QL: SIGNIFICANT CHANGE UP
FLUBV AG NPH QL: SIGNIFICANT CHANGE UP
GLUCOSE BLDC GLUCOMTR-MCNC: 180 MG/DL — HIGH (ref 70–99)
GLUCOSE SERPL-MCNC: 187 MG/DL — HIGH (ref 70–99)
GLUCOSE UR QL: >=1000 MG/DL
HCT VFR BLD CALC: 38.8 % — SIGNIFICANT CHANGE UP (ref 34.5–45)
HGB BLD-MCNC: 12.2 G/DL — SIGNIFICANT CHANGE UP (ref 11.5–15.5)
IMM GRANULOCYTES NFR BLD AUTO: 0.3 % — SIGNIFICANT CHANGE UP (ref 0–0.9)
INR BLD: 0.89 RATIO — SIGNIFICANT CHANGE UP (ref 0.85–1.18)
KETONES UR-MCNC: NEGATIVE MG/DL — SIGNIFICANT CHANGE UP
LEUKOCYTE ESTERASE UR-ACNC: NEGATIVE — SIGNIFICANT CHANGE UP
LYMPHOCYTES # BLD AUTO: 3.61 K/UL — HIGH (ref 1–3.3)
LYMPHOCYTES # BLD AUTO: 36.6 % — SIGNIFICANT CHANGE UP (ref 13–44)
MCHC RBC-ENTMCNC: 28.7 PG — SIGNIFICANT CHANGE UP (ref 27–34)
MCHC RBC-ENTMCNC: 31.4 G/DL — LOW (ref 32–36)
MCV RBC AUTO: 91.3 FL — SIGNIFICANT CHANGE UP (ref 80–100)
MONOCYTES # BLD AUTO: 0.57 K/UL — SIGNIFICANT CHANGE UP (ref 0–0.9)
MONOCYTES NFR BLD AUTO: 5.8 % — SIGNIFICANT CHANGE UP (ref 2–14)
NEUTROPHILS # BLD AUTO: 5.38 K/UL — SIGNIFICANT CHANGE UP (ref 1.8–7.4)
NEUTROPHILS NFR BLD AUTO: 54.5 % — SIGNIFICANT CHANGE UP (ref 43–77)
NITRITE UR-MCNC: NEGATIVE — SIGNIFICANT CHANGE UP
NRBC # BLD: 0 /100 WBCS — SIGNIFICANT CHANGE UP (ref 0–0)
PH UR: 7.5 — SIGNIFICANT CHANGE UP (ref 5–8)
PLATELET # BLD AUTO: 197 K/UL — SIGNIFICANT CHANGE UP (ref 150–400)
POTASSIUM SERPL-MCNC: 4.3 MMOL/L — SIGNIFICANT CHANGE UP (ref 3.5–5.3)
POTASSIUM SERPL-SCNC: 4.3 MMOL/L — SIGNIFICANT CHANGE UP (ref 3.5–5.3)
PROT SERPL-MCNC: 9 GM/DL — HIGH (ref 6–8.3)
PROT UR-MCNC: NEGATIVE MG/DL — SIGNIFICANT CHANGE UP
PROTHROM AB SERPL-ACNC: 10.6 SEC — SIGNIFICANT CHANGE UP (ref 9.5–13)
RBC # BLD: 4.25 M/UL — SIGNIFICANT CHANGE UP (ref 3.8–5.2)
RBC # FLD: 14.6 % — HIGH (ref 10.3–14.5)
SARS-COV-2 RNA SPEC QL NAA+PROBE: SIGNIFICANT CHANGE UP
SODIUM SERPL-SCNC: 140 MMOL/L — SIGNIFICANT CHANGE UP (ref 135–145)
SP GR SPEC: >1.03 — HIGH (ref 1–1.03)
TROPONIN I, HIGH SENSITIVITY RESULT: 4.5 NG/L — SIGNIFICANT CHANGE UP
UROBILINOGEN FLD QL: 1 MG/DL — SIGNIFICANT CHANGE UP (ref 0.2–1)
WBC # BLD: 9.87 K/UL — SIGNIFICANT CHANGE UP (ref 3.8–10.5)
WBC # FLD AUTO: 9.87 K/UL — SIGNIFICANT CHANGE UP (ref 3.8–10.5)

## 2024-04-24 PROCEDURE — 70498 CT ANGIOGRAPHY NECK: CPT | Mod: 26,MC

## 2024-04-24 PROCEDURE — 99285 EMERGENCY DEPT VISIT HI MDM: CPT | Mod: FS

## 2024-04-24 PROCEDURE — 71046 X-RAY EXAM CHEST 2 VIEWS: CPT | Mod: 26

## 2024-04-24 PROCEDURE — 93880 EXTRACRANIAL BILAT STUDY: CPT | Mod: 26

## 2024-04-24 PROCEDURE — 99222 1ST HOSP IP/OBS MODERATE 55: CPT

## 2024-04-24 PROCEDURE — 70496 CT ANGIOGRAPHY HEAD: CPT | Mod: 26,MC

## 2024-04-24 RX ORDER — INSULIN LISPRO 100/ML
VIAL (ML) SUBCUTANEOUS
Refills: 0 | Status: DISCONTINUED | OUTPATIENT
Start: 2024-04-24 | End: 2024-04-29

## 2024-04-24 RX ORDER — ATORVASTATIN CALCIUM 80 MG/1
80 TABLET, FILM COATED ORAL AT BEDTIME
Refills: 0 | Status: DISCONTINUED | OUTPATIENT
Start: 2024-04-24 | End: 2024-04-29

## 2024-04-24 RX ORDER — INSULIN LISPRO 100/ML
VIAL (ML) SUBCUTANEOUS AT BEDTIME
Refills: 0 | Status: DISCONTINUED | OUTPATIENT
Start: 2024-04-24 | End: 2024-04-29

## 2024-04-24 RX ORDER — DEXTROSE 50 % IN WATER 50 %
12.5 SYRINGE (ML) INTRAVENOUS ONCE
Refills: 0 | Status: DISCONTINUED | OUTPATIENT
Start: 2024-04-24 | End: 2024-04-29

## 2024-04-24 RX ORDER — DEXTROSE 50 % IN WATER 50 %
25 SYRINGE (ML) INTRAVENOUS ONCE
Refills: 0 | Status: DISCONTINUED | OUTPATIENT
Start: 2024-04-24 | End: 2024-04-29

## 2024-04-24 RX ORDER — GABAPENTIN 400 MG/1
100 CAPSULE ORAL
Refills: 0 | Status: DISCONTINUED | OUTPATIENT
Start: 2024-04-24 | End: 2024-04-29

## 2024-04-24 RX ORDER — SODIUM CHLORIDE 9 MG/ML
1000 INJECTION, SOLUTION INTRAVENOUS
Refills: 0 | Status: DISCONTINUED | OUTPATIENT
Start: 2024-04-24 | End: 2024-04-29

## 2024-04-24 RX ORDER — ACETAMINOPHEN 500 MG
650 TABLET ORAL EVERY 6 HOURS
Refills: 0 | Status: DISCONTINUED | OUTPATIENT
Start: 2024-04-24 | End: 2024-04-29

## 2024-04-24 RX ORDER — LANOLIN ALCOHOL/MO/W.PET/CERES
3 CREAM (GRAM) TOPICAL AT BEDTIME
Refills: 0 | Status: DISCONTINUED | OUTPATIENT
Start: 2024-04-24 | End: 2024-04-29

## 2024-04-24 RX ORDER — EMPAGLIFLOZIN 10 MG/1
1 TABLET, FILM COATED ORAL
Refills: 0 | DISCHARGE

## 2024-04-24 RX ORDER — LISINOPRIL 2.5 MG/1
40 TABLET ORAL DAILY
Refills: 0 | Status: DISCONTINUED | OUTPATIENT
Start: 2024-04-24 | End: 2024-04-29

## 2024-04-24 RX ORDER — GLUCAGON INJECTION, SOLUTION 0.5 MG/.1ML
1 INJECTION, SOLUTION SUBCUTANEOUS ONCE
Refills: 0 | Status: DISCONTINUED | OUTPATIENT
Start: 2024-04-24 | End: 2024-04-29

## 2024-04-24 RX ORDER — DEXTROSE 10 % IN WATER 10 %
125 INTRAVENOUS SOLUTION INTRAVENOUS ONCE
Refills: 0 | Status: DISCONTINUED | OUTPATIENT
Start: 2024-04-24 | End: 2024-04-29

## 2024-04-24 RX ORDER — DEXTROSE 50 % IN WATER 50 %
15 SYRINGE (ML) INTRAVENOUS ONCE
Refills: 0 | Status: DISCONTINUED | OUTPATIENT
Start: 2024-04-24 | End: 2024-04-29

## 2024-04-24 RX ORDER — AMLODIPINE BESYLATE 2.5 MG/1
10 TABLET ORAL DAILY
Refills: 0 | Status: DISCONTINUED | OUTPATIENT
Start: 2024-04-25 | End: 2024-04-29

## 2024-04-24 RX ORDER — ASPIRIN/CALCIUM CARB/MAGNESIUM 324 MG
81 TABLET ORAL DAILY
Refills: 0 | Status: DISCONTINUED | OUTPATIENT
Start: 2024-04-24 | End: 2024-04-29

## 2024-04-24 RX ADMIN — Medication 81 MILLIGRAM(S): at 17:18

## 2024-04-24 RX ADMIN — GABAPENTIN 100 MILLIGRAM(S): 400 CAPSULE ORAL at 17:17

## 2024-04-24 RX ADMIN — LISINOPRIL 40 MILLIGRAM(S): 2.5 TABLET ORAL at 17:17

## 2024-04-24 RX ADMIN — ATORVASTATIN CALCIUM 80 MILLIGRAM(S): 80 TABLET, FILM COATED ORAL at 21:39

## 2024-04-24 NOTE — H&P ADULT - ASSESSMENT
78 years old female with h/o HTN, HLD, DM, h/o right frontal infarct in 12/2023 was brought in to ED with transient unresponsiveness. While talking with daughter over the phone with merry, daughter noted that patient had episode of unresponsiveness with eye rolling upward. Unclear how long it lasted, likely for a few minutes. No loss of bladder or bowel control. Patient cannot recall the event  slightly hypertensive, afebrile, sat well at RA. CT head with no acute pathology. Chronic ischemic changes. CTA with multifocal narrowing of major intracranial vessels, 2-3mm vascular outputching of anterior communiting artery ( similar to prior, ED discussed with radiologist). Multiple hypodense nodularity throughout the bilateral lobes of the thyroid.

## 2024-04-24 NOTE — H&P ADULT - PROBLEM SELECTOR PLAN 1
transient unresponsiveness while talking to daughter with facetime. Patient was sitting on sofa at that time  CT head  ( I personally review) with no acute pathology. Chronic ischemic changes. CTA with multifocal narrowing of major intracranial vessels, 2-3mm vascular outpouching of anterior comminuting artery ( similar to prior, ED discussed with radiologist)  No focal neurological weakness  syncope vs seizure  Check EEG, carotid doppler  On aspirin, statin  ED consulted neurology

## 2024-04-24 NOTE — ED PROVIDER NOTE - PHYSICAL EXAMINATION
PHYSICAL EXAM:    GENERAL: Alert, appears stated age, well appearing, non-toxic  SKIN: Warm, and dry. MMM.   HEAD: NC, AT  EYE: Normal lids/conjunctiva, PERRL, EOMI  ENT: Normal hearing, patent oropharynx   NECK: +supple. No meningismus, or JVD.   Pulm: Bilateral BS, normal resp effort, no wheezes, stridor, or retractions  CV: RRR, no M/R/G, 2+and = radial pulses  Abd: soft, non-tender, non-distended, no hepatosplenomegaly. no CVA tenderness.   Mskel: no erythema, cyanosis, edema. no calf tenderness  Neuro: AAOx3, no sensory/motor deficits, CN 2-12 intact. No speech slurring, pronator drift, facial asymmetry. normal finger-to-nose b/l. 5/5 strength throughout.

## 2024-04-24 NOTE — ED PROVIDER NOTE - OBJECTIVE STATEMENT
78-year-old female with PMH HTN, HLD, diabetes, right frontal infarct 12/2023 78-year-old female with PMH HTN, HLD, diabetes, right frontal infarct 12/2023 presents with episode of syncope witnessed via FaceTime by daughter in which daughter describes pt looking up and not answering her questions x 1 minute while seated on sofa at which time she hung up and called 911.  pt does not recall event or coming here by ambulance.   No self micturition/defecation, tongue biting, full body shaking, history of seizures  no fever, cough, cp, sob, n/v/d, any sxs at all.   pt is at baseline mental status and without complaints here.

## 2024-04-24 NOTE — H&P ADULT - HISTORY OF PRESENT ILLNESS
78 years old female with h/o HTN, HLD, DM, h/o right frontal infarct in 12/2023 was brought in to ED with transient unresponsiveness. While talking with daughter over the phone with merry, daughter noted that patient had episode of unresponsiveness with eye rolling upward. Unclear how long it lasted, likely for a few minutes. No loss of bladder or bowel control. Patient cannot recall the event  slightly hypertensive, afebrile, sat well at RA. CT head with no acute pathology. Chronic ischemic changes. CTA with multifocal narrowing of major intracranial vessels, 2-3mm vascular outputching of anterior commuting artery ( similar to prior, ED discussed with radiologist). Multiple hypodense nodularity throughout the bilateral lobes of the thyroid.    SH: no toxic habits  FH: HTN, DM

## 2024-04-24 NOTE — ED ADULT NURSE NOTE - OBJECTIVE STATEMENT
77 yo female, A&Ox4, BIBEMS from home s/p syncopal episode while on Facetime with daughter at home. Patient is able to move all extremities, speak in full sentences, sensory intact. Denies chest pain, sob, Daughter at bedside. PMH: HTN, CVA no residual, DMT2, HLD

## 2024-04-24 NOTE — ED PROVIDER NOTE - PROGRESS NOTE DETAILS
BOB LOWRY: discussed with Dr. Monteiro, medicine, accepts admission. Discussed with Dr. Gay, neurology,  recommends EEG.

## 2024-04-24 NOTE — PATIENT PROFILE ADULT - FALL HARM RISK - HARM RISK INTERVENTIONS

## 2024-04-24 NOTE — ED PROVIDER NOTE - ATTENDING APP SHARED VISIT CONTRIBUTION OF CARE
Benson:  I have independently evaluated the patient and have documented in the appropriate sections above.  I agree with the exam and plan as noted above by BOB Goldberg.    We discussed the risk and benefits of admission regarding the imaging findings and whether they warrant admissions.

## 2024-04-24 NOTE — H&P ADULT - NSHPPHYSICALEXAM_GEN_ALL_CORE
CONSTITUTIONAL: alert and cooperative, no acute distress  EYES: PERRL, no scleral icterus  ENT: Mucosa moist, tongue normal  NECK: Neck supple, trachea midline, non-tender  CARDIAC: Normal S1 and S2. Regular rate and rhythms. No Pedal edema. Peripheral pulses intact  LUNGS: Equal air entry both lungs. No rales, rhonchi, wheezing. Normal respiratory effort.   ABDOMEN: Soft, nondistended, nontender. No guarding or rebound tenderness. No hepatomegaly or splenomegaly. Bowel sound normal.  MUSCULOSKELETAL: Normocephalic, atraumatic. No significant deformity or joint abnormality  NEUROLOGICAL: No gross motor or sensory deficits. CN II-XII grossly intact  SKIN: no lesions or eruptions. Normal turgor  PSYCHIATRIC: A&O x 3, appropriate mood and affect

## 2024-04-24 NOTE — ED ADULT NURSE NOTE - NSFALLUNIVINTERV_ED_ALL_ED
Bed/Stretcher in lowest position, wheels locked, appropriate side rails in place/Call bell, personal items and telephone in reach/Instruct patient to call for assistance before getting out of bed/chair/stretcher/Non-slip footwear applied when patient is off stretcher/[place] to call system/Physically safe environment - no spills, clutter or unnecessary equipment/Purposeful proactive rounding/Room/bathroom lighting operational, light cord in reach

## 2024-04-25 LAB
A1C WITH ESTIMATED AVERAGE GLUCOSE RESULT: 7.4 % — HIGH (ref 4–5.6)
ALBUMIN SERPL ELPH-MCNC: 3.6 G/DL — SIGNIFICANT CHANGE UP (ref 3.3–5)
ALP SERPL-CCNC: 73 U/L — SIGNIFICANT CHANGE UP (ref 40–120)
ALT FLD-CCNC: 19 U/L — SIGNIFICANT CHANGE UP (ref 12–78)
ANION GAP SERPL CALC-SCNC: 7 MMOL/L — SIGNIFICANT CHANGE UP (ref 5–17)
AST SERPL-CCNC: 22 U/L — SIGNIFICANT CHANGE UP (ref 15–37)
BILIRUB SERPL-MCNC: 0.8 MG/DL — SIGNIFICANT CHANGE UP (ref 0.2–1.2)
BUN SERPL-MCNC: 13 MG/DL — SIGNIFICANT CHANGE UP (ref 7–23)
CALCIUM SERPL-MCNC: 10.6 MG/DL — HIGH (ref 8.5–10.1)
CHLORIDE SERPL-SCNC: 110 MMOL/L — HIGH (ref 96–108)
CHOLEST SERPL-MCNC: 177 MG/DL — SIGNIFICANT CHANGE UP
CO2 SERPL-SCNC: 22 MMOL/L — SIGNIFICANT CHANGE UP (ref 22–31)
CREAT SERPL-MCNC: 0.9 MG/DL — SIGNIFICANT CHANGE UP (ref 0.5–1.3)
EGFR: 66 ML/MIN/1.73M2 — SIGNIFICANT CHANGE UP
ESTIMATED AVERAGE GLUCOSE: 166 MG/DL — HIGH (ref 68–114)
GLUCOSE BLDC GLUCOMTR-MCNC: 139 MG/DL — HIGH (ref 70–99)
GLUCOSE BLDC GLUCOMTR-MCNC: 179 MG/DL — HIGH (ref 70–99)
GLUCOSE BLDC GLUCOMTR-MCNC: 201 MG/DL — HIGH (ref 70–99)
GLUCOSE BLDC GLUCOMTR-MCNC: 211 MG/DL — HIGH (ref 70–99)
GLUCOSE SERPL-MCNC: 160 MG/DL — HIGH (ref 70–99)
HCT VFR BLD CALC: 37.8 % — SIGNIFICANT CHANGE UP (ref 34.5–45)
HDLC SERPL-MCNC: 58 MG/DL — SIGNIFICANT CHANGE UP
HGB BLD-MCNC: 11.9 G/DL — SIGNIFICANT CHANGE UP (ref 11.5–15.5)
LIPID PNL WITH DIRECT LDL SERPL: 101 MG/DL — HIGH
MAGNESIUM SERPL-MCNC: 1.9 MG/DL — SIGNIFICANT CHANGE UP (ref 1.6–2.6)
MCHC RBC-ENTMCNC: 28.7 PG — SIGNIFICANT CHANGE UP (ref 27–34)
MCHC RBC-ENTMCNC: 31.5 G/DL — LOW (ref 32–36)
MCV RBC AUTO: 91.3 FL — SIGNIFICANT CHANGE UP (ref 80–100)
NON HDL CHOLESTEROL: 119 MG/DL — SIGNIFICANT CHANGE UP
NRBC # BLD: 0 /100 WBCS — SIGNIFICANT CHANGE UP (ref 0–0)
PHOSPHATE SERPL-MCNC: 2.4 MG/DL — LOW (ref 2.5–4.5)
PLATELET # BLD AUTO: 201 K/UL — SIGNIFICANT CHANGE UP (ref 150–400)
POTASSIUM SERPL-MCNC: 4.2 MMOL/L — SIGNIFICANT CHANGE UP (ref 3.5–5.3)
POTASSIUM SERPL-SCNC: 4.2 MMOL/L — SIGNIFICANT CHANGE UP (ref 3.5–5.3)
PROT SERPL-MCNC: 8.6 GM/DL — HIGH (ref 6–8.3)
RBC # BLD: 4.14 M/UL — SIGNIFICANT CHANGE UP (ref 3.8–5.2)
RBC # FLD: 14.5 % — SIGNIFICANT CHANGE UP (ref 10.3–14.5)
SODIUM SERPL-SCNC: 139 MMOL/L — SIGNIFICANT CHANGE UP (ref 135–145)
T4 FREE SERPL-MCNC: 1.5 NG/DL — SIGNIFICANT CHANGE UP (ref 0.9–1.8)
TRIGL SERPL-MCNC: 99 MG/DL — SIGNIFICANT CHANGE UP
TSH SERPL-MCNC: 1.58 UIU/ML — SIGNIFICANT CHANGE UP (ref 0.36–3.74)
WBC # BLD: 9.47 K/UL — SIGNIFICANT CHANGE UP (ref 3.8–10.5)
WBC # FLD AUTO: 9.47 K/UL — SIGNIFICANT CHANGE UP (ref 3.8–10.5)

## 2024-04-25 PROCEDURE — 99232 SBSQ HOSP IP/OBS MODERATE 35: CPT

## 2024-04-25 PROCEDURE — 99222 1ST HOSP IP/OBS MODERATE 55: CPT

## 2024-04-25 PROCEDURE — 76536 US EXAM OF HEAD AND NECK: CPT | Mod: 26

## 2024-04-25 PROCEDURE — 95816 EEG AWAKE AND DROWSY: CPT | Mod: 26

## 2024-04-25 RX ADMIN — Medication 81 MILLIGRAM(S): at 11:35

## 2024-04-25 RX ADMIN — Medication 2: at 11:34

## 2024-04-25 RX ADMIN — ATORVASTATIN CALCIUM 80 MILLIGRAM(S): 80 TABLET, FILM COATED ORAL at 21:16

## 2024-04-25 RX ADMIN — Medication 650 MILLIGRAM(S): at 11:35

## 2024-04-25 RX ADMIN — LISINOPRIL 40 MILLIGRAM(S): 2.5 TABLET ORAL at 05:34

## 2024-04-25 RX ADMIN — GABAPENTIN 100 MILLIGRAM(S): 400 CAPSULE ORAL at 05:34

## 2024-04-25 RX ADMIN — Medication 1: at 18:28

## 2024-04-25 RX ADMIN — AMLODIPINE BESYLATE 10 MILLIGRAM(S): 2.5 TABLET ORAL at 05:34

## 2024-04-25 RX ADMIN — GABAPENTIN 100 MILLIGRAM(S): 400 CAPSULE ORAL at 17:16

## 2024-04-25 NOTE — PROGRESS NOTE ADULT - SUBJECTIVE AND OBJECTIVE BOX
Patient is a 78y old  Female who presents with a chief complaint of AMS (24 Apr 2024 16:45)      INTERVAL HPI/OVERNIGHT EVENTS: no events noted overnight. patient was evaluated, spoke w/ daughter who informed that she was talking w/ her on video call when she suddenly started looking upwards and stopped responding, she couldn't tell if her eyes were open or closed. Patient reports that she was not confused afterwards. No prior hx of        MEDICATIONS  (STANDING):  amLODIPine   Tablet 10 milliGRAM(s) Oral daily  aspirin  chewable 81 milliGRAM(s) Oral daily  atorvastatin 80 milliGRAM(s) Oral at bedtime  dextrose 10% Bolus 125 milliLiter(s) IV Bolus once  dextrose 5%. 1000 milliLiter(s) (100 mL/Hr) IV Continuous <Continuous>  dextrose 5%. 1000 milliLiter(s) (50 mL/Hr) IV Continuous <Continuous>  dextrose 50% Injectable 12.5 Gram(s) IV Push once  dextrose 50% Injectable 25 Gram(s) IV Push once  gabapentin 100 milliGRAM(s) Oral two times a day  glucagon  Injectable 1 milliGRAM(s) IntraMuscular once  insulin lispro (ADMELOG) corrective regimen sliding scale   SubCutaneous three times a day before meals  insulin lispro (ADMELOG) corrective regimen sliding scale   SubCutaneous at bedtime  lisinopril 40 milliGRAM(s) Oral daily    MEDICATIONS  (PRN):  acetaminophen     Tablet .. 650 milliGRAM(s) Oral every 6 hours PRN Mild Pain (1 - 3), Moderate Pain (4 - 6)  dextrose Oral Gel 15 Gram(s) Oral once PRN Blood Glucose LESS THAN 70 milliGRAM(s)/deciliter  melatonin 3 milliGRAM(s) Oral at bedtime PRN Insomnia      __________________________________________________  REVIEW OF SYSTEMS:    CONSTITUTIONAL: No fever,   EYES: no acute visual disturbances  NECK: No pain or stiffness  RESPIRATORY: No cough; No shortness of breath  CARDIOVASCULAR: No chest pain, no palpitations  GASTROINTESTINAL: No pain. No nausea or vomiting; No diarrhea   NEUROLOGICAL: No headache or numbness, no tremors  MUSCULOSKELETAL: No joint pain, no muscle pain  GENITOURINARY: no dysuria, no frequency, no hesitancy  PSYCHIATRY: no depression , no anxiety  ALL OTHER  ROS negative        Vital Signs Last 24 Hrs  T(C): 36.8 (25 Apr 2024 17:15), Max: 37.2 (25 Apr 2024 00:02)  T(F): 98.3 (25 Apr 2024 17:15), Max: 99 (25 Apr 2024 00:02)  HR: 98 (25 Apr 2024 17:15) (78 - 98)  BP: 164/79 (25 Apr 2024 17:15) (120/64 - 164/79)  BP(mean): 96 (25 Apr 2024 15:04) (96 - 96)  RR: 18 (25 Apr 2024 17:15) (17 - 18)  SpO2: 98% (25 Apr 2024 17:15) (98% - 100%)    Parameters below as of 25 Apr 2024 15:04  Patient On (Oxygen Delivery Method): room air        ________________________________________________  PHYSICAL EXAM:  GENERAL: NAD  HEENT: Normocephalic;  conjunctivae and sclerae clear; moist mucous membranes;   NECK : supple  CHEST/LUNG: Clear to auscultation bilaterally with good air entry   HEART: S1 S2  regular; no murmurs, gallops or rubs  ABDOMEN: Soft, Nontender, Nondistended; Bowel sounds present  EXTREMITIES: no cyanosis; no edema; no calf tenderness  SKIN: warm and dry; no rash  NERVOUS SYSTEM:  Awake and alert; Oriented  to place, person and time ; no new deficits    _________________________________________________  LABS:                        11.9   9.47  )-----------( 201      ( 25 Apr 2024 09:07 )             37.8     04-25    139  |  110<H>  |  13  ----------------------------<  160<H>  4.2   |  22  |  0.90    Ca    10.6<H>      25 Apr 2024 09:07  Phos  2.4     04-25  Mg     1.9     04-25    TPro  8.6<H>  /  Alb  3.6  /  TBili  0.8  /  DBili  x   /  AST  22  /  ALT  19  /  AlkPhos  73  04-25    PT/INR - ( 24 Apr 2024 11:33 )   PT: 10.6 sec;   INR: 0.89 ratio         PTT - ( 24 Apr 2024 11:33 )  PTT:29.2 sec  Urinalysis Basic - ( 25 Apr 2024 09:07 )    Color: x / Appearance: x / SG: x / pH: x  Gluc: 160 mg/dL / Ketone: x  / Bili: x / Urobili: x   Blood: x / Protein: x / Nitrite: x   Leuk Esterase: x / RBC: x / WBC x   Sq Epi: x / Non Sq Epi: x / Bacteria: x      CAPILLARY BLOOD GLUCOSE      POCT Blood Glucose.: 201 mg/dL (25 Apr 2024 20:58)  POCT Blood Glucose.: 179 mg/dL (25 Apr 2024 18:23)  POCT Blood Glucose.: 211 mg/dL (25 Apr 2024 11:22)  POCT Blood Glucose.: 139 mg/dL (25 Apr 2024 08:11)        RADIOLOGY & ADDITIONAL TESTS:    Imaging Personally Reviewed:  YES    Consultant(s) Notes Reviewed:   YES    Care Discussed with Consultants : YES     Plan of care was discussed with patient and /or primary care giver; all questions and concerns were addressed and care was aligned with patient's wishes.         Patient is a 78y old  Female who presents with a chief complaint of AMS (24 Apr 2024 16:45)      INTERVAL HPI/OVERNIGHT EVENTS: no events noted overnight. Patient was evaluated, spoke w/ daughter who informed that she was talking w/ her on video call when she suddenly started looking upwards and stopped responding, she couldn't tell if her eyes were open or closed. Patient reports that she was not confused afterwards. No prior hx of seizures. No warning signs or prodromal features        MEDICATIONS  (STANDING):  amLODIPine   Tablet 10 milliGRAM(s) Oral daily  aspirin  chewable 81 milliGRAM(s) Oral daily  atorvastatin 80 milliGRAM(s) Oral at bedtime  dextrose 10% Bolus 125 milliLiter(s) IV Bolus once  dextrose 5%. 1000 milliLiter(s) (100 mL/Hr) IV Continuous <Continuous>  dextrose 5%. 1000 milliLiter(s) (50 mL/Hr) IV Continuous <Continuous>  dextrose 50% Injectable 12.5 Gram(s) IV Push once  dextrose 50% Injectable 25 Gram(s) IV Push once  gabapentin 100 milliGRAM(s) Oral two times a day  glucagon  Injectable 1 milliGRAM(s) IntraMuscular once  insulin lispro (ADMELOG) corrective regimen sliding scale   SubCutaneous three times a day before meals  insulin lispro (ADMELOG) corrective regimen sliding scale   SubCutaneous at bedtime  lisinopril 40 milliGRAM(s) Oral daily    MEDICATIONS  (PRN):  acetaminophen     Tablet .. 650 milliGRAM(s) Oral every 6 hours PRN Mild Pain (1 - 3), Moderate Pain (4 - 6)  dextrose Oral Gel 15 Gram(s) Oral once PRN Blood Glucose LESS THAN 70 milliGRAM(s)/deciliter  melatonin 3 milliGRAM(s) Oral at bedtime PRN Insomnia      __________________________________________________  REVIEW OF SYSTEMS:    CONSTITUTIONAL: No fever,   EYES: no acute visual disturbances  NECK: No pain or stiffness  RESPIRATORY: No cough; No shortness of breath  CARDIOVASCULAR: No chest pain, no palpitations  GASTROINTESTINAL: No pain. No nausea or vomiting; No diarrhea   NEUROLOGICAL: No headache or numbness, no tremors  MUSCULOSKELETAL: No joint pain, no muscle pain  GENITOURINARY: no dysuria, no frequency, no hesitancy  PSYCHIATRY: no depression , no anxiety  ALL OTHER  ROS negative        Vital Signs Last 24 Hrs  T(C): 36.8 (25 Apr 2024 17:15), Max: 37.2 (25 Apr 2024 00:02)  T(F): 98.3 (25 Apr 2024 17:15), Max: 99 (25 Apr 2024 00:02)  HR: 98 (25 Apr 2024 17:15) (78 - 98)  BP: 164/79 (25 Apr 2024 17:15) (120/64 - 164/79)  BP(mean): 96 (25 Apr 2024 15:04) (96 - 96)  RR: 18 (25 Apr 2024 17:15) (17 - 18)  SpO2: 98% (25 Apr 2024 17:15) (98% - 100%)    Parameters below as of 25 Apr 2024 15:04  Patient On (Oxygen Delivery Method): room air        ________________________________________________  PHYSICAL EXAM:  GENERAL: NAD  HEENT: Normocephalic;  conjunctivae and sclerae clear; moist mucous membranes;   NECK : supple  CHEST/LUNG: Clear to auscultation bilaterally with good air entry   HEART: S1 S2  regular; no murmurs, gallops or rubs  ABDOMEN: Soft, Nontender, Nondistended; Bowel sounds present  EXTREMITIES: no cyanosis; no edema; no calf tenderness  SKIN: warm and dry; no rash  NERVOUS SYSTEM:  Awake and alert; Oriented  to place, person and time ; no new deficits    _________________________________________________  LABS:                        11.9   9.47  )-----------( 201      ( 25 Apr 2024 09:07 )             37.8     04-25    139  |  110<H>  |  13  ----------------------------<  160<H>  4.2   |  22  |  0.90    Ca    10.6<H>      25 Apr 2024 09:07  Phos  2.4     04-25  Mg     1.9     04-25    TPro  8.6<H>  /  Alb  3.6  /  TBili  0.8  /  DBili  x   /  AST  22  /  ALT  19  /  AlkPhos  73  04-25    PT/INR - ( 24 Apr 2024 11:33 )   PT: 10.6 sec;   INR: 0.89 ratio         PTT - ( 24 Apr 2024 11:33 )  PTT:29.2 sec  Urinalysis Basic - ( 25 Apr 2024 09:07 )    Color: x / Appearance: x / SG: x / pH: x  Gluc: 160 mg/dL / Ketone: x  / Bili: x / Urobili: x   Blood: x / Protein: x / Nitrite: x   Leuk Esterase: x / RBC: x / WBC x   Sq Epi: x / Non Sq Epi: x / Bacteria: x      CAPILLARY BLOOD GLUCOSE      POCT Blood Glucose.: 201 mg/dL (25 Apr 2024 20:58)  POCT Blood Glucose.: 179 mg/dL (25 Apr 2024 18:23)  POCT Blood Glucose.: 211 mg/dL (25 Apr 2024 11:22)  POCT Blood Glucose.: 139 mg/dL (25 Apr 2024 08:11)        RADIOLOGY & ADDITIONAL TESTS:    Imaging Personally Reviewed:  YES    Consultant(s) Notes Reviewed:   YES    Care Discussed with Consultants : YES     Plan of care was discussed with patient and /or primary care giver; all questions and concerns were addressed and care was aligned with patient's wishes.

## 2024-04-25 NOTE — CONSULT NOTE ADULT - ASSESSMENT
Residual L hemiparesis from R frontal ischemic stroke December 2023.    Generalized weakness.  Senile asthenia.  Deconditioning (limited physical activity)    Syncope and collapse.  Not consistent with stroke, TIA, or seizure.     Residual L hemiparesis from R frontal ischemic stroke December 2023.    Generalized weakness.  Senile asthenia.  Deconditioning (limited physical activity)    Syncope and collapse.  Not consistent with stroke, TIA, or seizure.  Risk factors include:  Asthenia.  Antihypertensive Rx.  Cerebrovascular disease (multiple arterial stenoses).  Neck extension (possible mechanical compromise of, in particular, vertebral arteries.       Residual L hemiparesis from R frontal ischemic stroke December 2023.    Generalized weakness.  Senile asthenia.  Deconditioning (limited physical activity)    Syncope and collapse.  Not consistent with stroke, TIA, or seizure.  Risk factors include:  Asthenia.  Antihypertensive Rx.  Cerebrovascular disease (multiple arterial stenoses).  Neck extension (possible mechanical compromise of, in particular, vertebral arteries.    R/O cardiac arrhythmia.    RECOMMENDATIONS    TTE with bubble study.    Cardiac telemetry.      Consider out-Pt ambulatory cardiac monitoring.      PT evaluation.      Orthostatic vital sign measurements x 2  Orthostatic readings (systolic, diastolic, heart rate) should be taken: after at least 5 minutes supine, then "immediately" upon sitting, and after 1,2,3 and 5 minutes seated.  If there is a significant drop upon sitting, or Pt has symptoms (e.g., lightheadedness), then Pt is to remain seated (or even made to lie down again, if necessary) until symptoms (if any) resolve.  After having taken readings with patient seated,  have patient stand and take readings immediately, and after 1, 2, 3 and 5 mins.  Again, if symptomatic or BP  or pulse drops significantly, you have to have patient sit down.        If the only readings taken after sitting or standing are taken after several minutes (which is how measurements are typically obtained), then neurally-mediated failure of BP/P adjustments to postural change may not be detected, giving a false sense of security.    The above steps are more readily performed by two persons acting in concert rather than by one examiner unassisted.                                                             IMPORTANT  -  PLEASE NOTE:                              I am a neurohospitalist. I do not see patients outside of the hospital.        Patients requiring neurological follow-up after discharge may contact one of the following offices.     [facility] [place]  [address] [place] [address]  [phone]    [facility]  [address]  [place], [place]  [phone]    Baljinder Gay M.D.   - Department of Neurology  [employer] [facility]

## 2024-04-25 NOTE — CONSULT NOTE ADULT - SUBJECTIVE AND OBJECTIVE BOX
BIBEMS from home yesterday.  History from Admission H&P    <Start of quote(s) from H&P>  "Reason for Admission: AMS  History of Present Illness:   78 years old female with h/o HTN, HLD, DM, h/o right frontal infarct in 12/2023 was brought in to ED with transient unresponsiveness. While talking with daughter over the phone with [employer], daughter noted that patient had episode of unresponsiveness with eye rolling upward. Unclear how long it lasted, likely for a few minutes. No loss of bladder or bowel control. Patient cannot recall the event  slightly hypertensive, afebrile, sat well at [facility]. CT head with no acute pathology. Chronic ischemic changes. CTA with multifocal narrowing of major intracranial vessels, 2-3mm vascular outputching of anterior commuting artery ( similar to prior, ED discussed with radiologist). Multiple hypodense nodularity throughout the bilateral lobes of the thyroid.    SH: no toxic habits  . . .     Review of Systems: All ROS were negative except transient unresponsiveness   . . .     Home Medications:   * Patient Currently Takes Medications as of 12-Dec-2023 12:38 documented in Structured Notes  · 	PT/OT and Speech: Once  · 	aspirin 81 mg oral tablet, chewable: 1 tab(s) orally once a day  · 	amLODIPine 10 mg oral tablet: 1 tab(s) orally once a day  · 	Lipitor 80 mg oral tablet: 1 tab(s) orally once a day (at bedtime)  · 	lisinopril 40 mg oral tablet: 1 tab(s) orally once a day  · 	 mg oral tablet: 1 tab(s) orally every 4 to 6 hours, As Needed -for moderate pain   · 	gabapentin 100 mg oral tablet: orally 2 times a day  · 	ferrous fumarate 325 mg (106 mg elemental iron) oral tablet: 1 tab(s) orally once a day  · 	Janumet 50 mg-1000 mg oral tablet: 1 tab(s) orally 2 times a day  · 	Jardiance 10 mg oral tablet: Last Dose Taken:  , 1 tab(s) orally once a day   . . .     · Substance use	No"  <End of quote(s) from H&P>     BIBEMS from home yesterday.  History from Admission H&P    <Start of quote(s) from H&P>  "Reason for Admission: AMS  History of Present Illness:   78 years old female with h/o HTN, HLD, DM, h/o right frontal infarct in 12/2023 was brought in to ED with transient unresponsiveness. While talking with daughter over the phone with [employer], daughter noted that patient had episode of unresponsiveness with eye rolling upward. Unclear how long it lasted, likely for a few minutes. No loss of bladder or bowel control. Patient cannot recall the event  slightly hypertensive, afebrile, sat well at [facility]. CT head with no acute pathology. Chronic ischemic changes. CTA with multifocal narrowing of major intracranial vessels, 2-3mm vascular outputching of anterior commuting artery ( similar to prior, ED discussed with radiologist). Multiple hypodense nodularity throughout the bilateral lobes of the thyroid.    SH: no toxic habits  . . .     Review of Systems: All ROS were negative except transient unresponsiveness   . . .     Home Medications:   * Patient Currently Takes Medications as of 12-Dec-2023 12:38 documented in Structured Notes  · 	PT/OT and Speech: Once  · 	aspirin 81 mg oral tablet, chewable: 1 tab(s) orally once a day  · 	amLODIPine 10 mg oral tablet: 1 tab(s) orally once a day  · 	Lipitor 80 mg oral tablet: 1 tab(s) orally once a day (at bedtime)  · 	lisinopril 40 mg oral tablet: 1 tab(s) orally once a day  · 	 mg oral tablet: 1 tab(s) orally every 4 to 6 hours, As Needed -for moderate pain   · 	gabapentin 100 mg oral tablet: orally 2 times a day  · 	ferrous fumarate 325 mg (106 mg elemental iron) oral tablet: 1 tab(s) orally once a day  · 	Janumet 50 mg-1000 mg oral tablet: 1 tab(s) orally 2 times a day  · 	Jardiance 10 mg oral tablet: Last Dose Taken:  , 1 tab(s) orally once a day   . . .     · Substance use	No"  <End of quote(s) from H&P>    Per radiology report of CT head and CTAs head and neck:  "IMPRESSION:    CT HEAD:  1.  No evidence of acute intracranial hemorrhage or midline shift.  2.  Chronic ischemic changes as discussed above.    CTA BRAIN:  1.  Severe multifocal narrowing of the major intracranial vessels as   discussed above.  2.  2-3 mm vascular outpouching at the anterior commuting artery, likely   small saccular aneurysm versus vascular infundibulum.    CTA NECK:  1.  No evidence of critical stenosis by NASCET criteria.  2.  Multiple hypodense nodularity throughout the bilateral lobes of the   thyroid. Recommend nonemergent thyroid ultrasound for further   characterization if clinically indicated."    x   BIBEMS from home yesterday.  History from Admission H&P    <Start of quote(s) from H&P>  "Reason for Admission: AMS  History of Present Illness:   78 years old female with h/o HTN, HLD, DM, h/o right frontal infarct in 12/2023 was brought in to ED with transient unresponsiveness. While talking with daughter over the phone with [employer], daughter noted that patient had episode of unresponsiveness with eye rolling upward. Unclear how long it lasted, likely for a few minutes. No loss of bladder or bowel control. Patient cannot recall the event  slightly hypertensive, afebrile, sat well at [facility]. CT head with no acute pathology. Chronic ischemic changes. CTA with multifocal narrowing of major intracranial vessels, 2-3mm vascular outputching of anterior commuting artery ( similar to prior, ED discussed with radiologist). Multiple hypodense nodularity throughout the bilateral lobes of the thyroid.    SH: no toxic habits  . . .     Review of Systems: All ROS were negative except transient unresponsiveness   . . .     Home Medications:   * Patient Currently Takes Medications as of 12-Dec-2023 12:38 documented in Structured Notes  · 	PT/OT and Speech: Once  · 	aspirin 81 mg oral tablet, chewable: 1 tab(s) orally once a day  · 	amLODIPine 10 mg oral tablet: 1 tab(s) orally once a day  · 	Lipitor 80 mg oral tablet: 1 tab(s) orally once a day (at bedtime)  · 	lisinopril 40 mg oral tablet: 1 tab(s) orally once a day  · 	 mg oral tablet: 1 tab(s) orally every 4 to 6 hours, As Needed -for moderate pain   · 	gabapentin 100 mg oral tablet: orally 2 times a day  · 	ferrous fumarate 325 mg (106 mg elemental iron) oral tablet: 1 tab(s) orally once a day  · 	Janumet 50 mg-1000 mg oral tablet: 1 tab(s) orally 2 times a day  · 	Jardiance 10 mg oral tablet: Last Dose Taken:  , 1 tab(s) orally once a day   . . .     · Substance use	No"  <End of quote(s) from H&P>    Per my interviewing the patient I note in addition or in particular:  She recalls being seated on the sofa, talking to her daughter on her cell phone via [employer] when she stretched her head back, made gurgling/raspy sounds.  Next rememebrs being found on the floor, she does not recollect how she got there.           Per radiology report of CT head and CTAs head and neck:  "IMPRESSION:    CT HEAD:  1.  No evidence of acute intracranial hemorrhage or midline shift.  2.  Chronic ischemic changes as discussed above.    CTA BRAIN:  1.  Severe multifocal narrowing of the major intracranial vessels as   discussed above.  2.  2-3 mm vascular outpouching at the anterior commuting artery, likely   small saccular aneurysm versus vascular infundibulum.    CTA NECK:  1.  No evidence of critical stenosis by NASCET criteria.  2.  Multiple hypodense nodularity throughout the bilateral lobes of the   thyroid. Recommend nonemergent thyroid ultrasound for further   characterization if clinically indicated."      EXAMINATION    Awake, alert.  Grossly ramiro comprehension, expression, prosody, articulation.  She names the fingers as big thumb, index, ring finger and baby finger.      On confrontation testing of limb muscles she evidences significant generalized weakness, worse on the L side.  Milagros L hand slow vs. the R.      Reflex                           Right    Left   Comment    Biceps                             1         2  Triceps                            0        0  Patellar                insufficient relaxation  Gastroc               insufficient relaxation  Plantar                        mute   mute     P/LT sensation grossly intact; no extinction on DSS.    Pt not amenable to getting OOB to have gait and station assessed.          BIBEMS from home yesterday.  History from Admission H&P    <Start of quote(s) from H&P>  "Reason for Admission: AMS  History of Present Illness:   78 years old female with h/o HTN, HLD, DM, h/o right frontal infarct in 12/2023 was brought in to ED with transient unresponsiveness. While talking with daughter over the phone with [employer], daughter noted that patient had episode of unresponsiveness with eye rolling upward. Unclear how long it lasted, likely for a few minutes. No loss of bladder or bowel control. Patient cannot recall the event  slightly hypertensive, afebrile, sat well at [facility]. CT head with no acute pathology. Chronic ischemic changes. CTA with multifocal narrowing of major intracranial vessels, 2-3mm vascular outputching of anterior commuting artery ( similar to prior, ED discussed with radiologist). Multiple hypodense nodularity throughout the bilateral lobes of the thyroid.    SH: no toxic habits  . . .     Review of Systems: All ROS were negative except transient unresponsiveness   . . .     Home Medications:   * Patient Currently Takes Medications as of 12-Dec-2023 12:38 documented in Structured Notes  · 	PT/OT and Speech: Once  · 	aspirin 81 mg oral tablet, chewable: 1 tab(s) orally once a day  · 	amLODIPine 10 mg oral tablet: 1 tab(s) orally once a day  · 	Lipitor 80 mg oral tablet: 1 tab(s) orally once a day (at bedtime)  · 	lisinopril 40 mg oral tablet: 1 tab(s) orally once a day  · 	 mg oral tablet: 1 tab(s) orally every 4 to 6 hours, As Needed -for moderate pain   · 	gabapentin 100 mg oral tablet: orally 2 times a day  · 	ferrous fumarate 325 mg (106 mg elemental iron) oral tablet: 1 tab(s) orally once a day  · 	Janumet 50 mg-1000 mg oral tablet: 1 tab(s) orally 2 times a day  · 	Jardiance 10 mg oral tablet: Last Dose Taken:  , 1 tab(s) orally once a day   . . .     · Substance use	No"  <End of quote(s) from H&P>    Per my interviewing the patient I note in addition or in particular:  She recalls being seated on the sofa, talking to her daughter on her cell phone via [employer] when she stretched her head back, made gurgling/raspy sounds.  Next rememebrs being found on the floor, she does not recollect how she got there.           Per radiology report of CT head and CTAs head and neck:  "COMPARISON: CT of the head with CTA head and neck December 8, 2023    FINDINGS:    CT HEAD:  No acute intracranial hemorrhage. Areas of decreased attenuation in the   deep and periventricular white matter, likely related to chronic small   vessel disease. Encephalomalacia/gliotic change in the right frontal   lobe. No hydrocephalus. The extra-axial spaces and basal cisterns are   within normal limits. No midline shift or mass effect present.    The cranial cervical junction is within normal limits. The sella is not   expanded. No depressed calvarial fracture. The visualized paranasal   sinuses are well aerated. The visualized mastoid air cells are well   aerated. The visualized orbits are within normal limits.    CTA BRAIN:  The intracranial segments of the bilateral ICAs opacify with intraluminal   contrast. Atherosclerotic calcification throughout the intracranial   segments of the ICAs. This results in multifocal narrowing in the   intracranial ICAs with severe focal narrowing in the right supraclinoid   ICA and severe focal narrowing in the left cavernous ICA.    2 to 3 mm inferiorly oriented vascular outpouching in the anterior   commuting artery, image 287 of series [id number].    Multifocal narrowing throughout the bilateral MCA distributions discussed   below:  Moderate to severe focal narrowing in a distal right M2 segment, image   166 of series 5.  Severe focal narrowing in a right M3/M4 segment, image 167 of series 5.  Severe focal narrowing in the inferior left M2 division, image 173 of   series 5.  Severe focal narrowing in the superior left M2 division, image 168 of   series 5.  Severe focal narrowing in the superior left M2 division, image 158 of   series 5.    Mild multifocal narrowing in the bilateral WILLIAM distributions.    Atherosclerotic calcification plaque through the courses of the   intradural vertebral arteries, resulting in focal narrowing discussed   below:  Severe focal narrowing in the distal right vertebral artery, image 236 of   series 5.  Severe multifocal narrowings in the left vertebral artery, images 260 and   232 of series 5.    Multifocal narrowing throughout the course of the basilar artery, most   severe on image 182 of series 5. Mild to moderate focal narrowings of the   courses of the bilateral PCAs.    The dural-based sinuses opacify with contrast to the extentvisualized.    CTA NECK:  Limited evaluation of the proximal vessels of the neck due to streak   artifact.    There is a two-vessel arch. The common carotid arteries opacify normally   with intraluminal contrast. The carotid bulbs are unremarkable. The   bilateral ICAs opacify normally with intraluminal contrast to the skull   base. The bilateral ICAs are tortuous.    The vertebral arteries have normal origins. The vertebral arteries are   codominant. Focal narrowing in the proximal left vertebral artery near   the vertebral artery ostium. The vertebral arteries opacify with   intraluminal contrast to the skull base.    Multiple hypodense nodularity throughout the bilateral lobes of the   thyroid.    Mild degenerative changes in the cervical spine.    Mild atelectatic change in the bilateral lung apices.    IMPRESSION:    CT HEAD:  1.  No evidence of acute intracranial hemorrhage or midline shift.  2.  Chronic ischemic changes as discussed above.    CTA BRAIN:  1.  Severe multifocal narrowing of the major intracranial vessels as   discussed above.  2.  2-3 mm vascular outpouching at the anterior commuting artery, likely   small saccular aneurysm versus vascular infundibulum.    CTA NECK:  1.  No evidence of critical stenosis by NASCET criteria.  2.  Multiple hypodense nodularity throughout the bilateral lobes of the   thyroid. Recommend nonemergent thyroid ultrasound for further   characterization if clinically indicated."      EXAMINATION    Awake, alert.  Grossly normal comprehension, expression, prosody, articulation.  She names the fingers as big thumb, index, ring finger and baby finger.      On confrontation testing of limb muscles she evidences significant generalized weakness, worse on the L side.  Milagros L hand slow vs. the R.      Reflex                           Right    Left   Comment    Biceps                             1         2  Triceps                            0        0  Patellar                insufficient relaxation  Gastroc               insufficient relaxation  Plantar                        mute   mute     P/LT sensation grossly intact; no extinction on DSS.    Pt not amenable to getting OOB to have gait and station assessed.          BIBEMS from home yesterday.  History from Admission H&P    <Start of quote(s) from H&P>  "Reason for Admission: AMS  History of Present Illness:   78 years old female with h/o HTN, HLD, DM, h/o right frontal infarct in 12/2023 was brought in to ED with transient unresponsiveness. While talking with daughter over the phone with [employer], daughter noted that patient had episode of unresponsiveness with eye rolling upward. Unclear how long it lasted, likely for a few minutes. No loss of bladder or bowel control. Patient cannot recall the event  slightly hypertensive, afebrile, sat well at [facility]. CT head with no acute pathology. Chronic ischemic changes. CTA with multifocal narrowing of major intracranial vessels, 2-3mm vascular outputching of anterior commuting artery ( similar to prior, ED discussed with radiologist). Multiple hypodense nodularity throughout the bilateral lobes of the thyroid.    SH: no toxic habits  . . .     Review of Systems: All ROS were negative except transient unresponsiveness   . . .     Home Medications:   * Patient Currently Takes Medications as of 12-Dec-2023 12:38 documented in Structured Notes  · 	PT/OT and Speech: Once  · 	aspirin 81 mg oral tablet, chewable: 1 tab(s) orally once a day  · 	amLODIPine 10 mg oral tablet: 1 tab(s) orally once a day  · 	Lipitor 80 mg oral tablet: 1 tab(s) orally once a day (at bedtime)  · 	lisinopril 40 mg oral tablet: 1 tab(s) orally once a day  · 	 mg oral tablet: 1 tab(s) orally every 4 to 6 hours, As Needed -for moderate pain   · 	gabapentin 100 mg oral tablet: orally 2 times a day  · 	ferrous fumarate 325 mg (106 mg elemental iron) oral tablet: 1 tab(s) orally once a day  · 	Janumet 50 mg-1000 mg oral tablet: 1 tab(s) orally 2 times a day  · 	Jardiance 10 mg oral tablet: Last Dose Taken:  , 1 tab(s) orally once a day   . . .     · Substance use	No"  <End of quote(s) from H&P>    Per my interviewing the patient I note in addition or in particular:  She recalls being seated on the sofa, talking to her daughter on her cell phone via [employer] when she stretched her head back, made gurgling/raspy sounds.  Next rememebrs being found on the floor, she does not recollect how she got there.           Per radiology report of CT head and CTAs head and neck:  "COMPARISON: CT of the head with CTA head and neck December 8, 2023    FINDINGS:    CT HEAD:  No acute intracranial hemorrhage. Areas of decreased attenuation in the   deep and periventricular white matter, likely related to chronic small   vessel disease. Encephalomalacia/gliotic change in the right frontal   lobe. No hydrocephalus. The extra-axial spaces and basal cisterns are   within normal limits. No midline shift or mass effect present.    The cranial cervical junction is within normal limits. The sella is not   expanded. No depressed calvarial fracture. The visualized paranasal   sinuses are well aerated. The visualized mastoid air cells are well   aerated. The visualized orbits are within normal limits.    CTA BRAIN:  The intracranial segments of the bilateral ICAs opacify with intraluminal   contrast. Atherosclerotic calcification throughout the intracranial   segments of the ICAs. This results in multifocal narrowing in the   intracranial ICAs with severe focal narrowing in the right supraclinoid   ICA and severe focal narrowing in the left cavernous ICA.    2 to 3 mm inferiorly oriented vascular outpouching in the anterior   commuting artery, image 287 of series [id number].    Multifocal narrowing throughout the bilateral MCA distributions discussed   below:  Moderate to severe focal narrowing in a distal right M2 segment, image   166 of series 5.  Severe focal narrowing in a right M3/M4 segment, image 167 of series 5.  Severe focal narrowing in the inferior left M2 division, image 173 of   series 5.  Severe focal narrowing in the superior left M2 division, image 168 of   series 5.  Severe focal narrowing in the superior left M2 division, image 158 of   series 5.    Mild multifocal narrowing in the bilateral WILLIAM distributions.    Atherosclerotic calcification plaque through the courses of the   intradural vertebral arteries, resulting in focal narrowing discussed   below:  Severe focal narrowing in the distal right vertebral artery, image 236 of   series 5.  Severe multifocal narrowings in the left vertebral artery, images 260 and   232 of series 5.    Multifocal narrowing throughout the course of the basilar artery, most   severe on image 182 of series 5. Mild to moderate focal narrowings of the   courses of the bilateral PCAs.    The dural-based sinuses opacify with contrast to the extentvisualized.    CTA NECK:  Limited evaluation of the proximal vessels of the neck due to streak   artifact.    There is a two-vessel arch. The common carotid arteries opacify normally   with intraluminal contrast. The carotid bulbs are unremarkable. The   bilateral ICAs opacify normally with intraluminal contrast to the skull   base. The bilateral ICAs are tortuous.    The vertebral arteries have normal origins. The vertebral arteries are   codominant. Focal narrowing in the proximal left vertebral artery near   the vertebral artery ostium. The vertebral arteries opacify with   intraluminal contrast to the skull base.    Multiple hypodense nodularity throughout the bilateral lobes of the   thyroid.    Mild degenerative changes in the cervical spine.    Mild atelectatic change in the bilateral lung apices.    IMPRESSION:    CT HEAD:  1.  No evidence of acute intracranial hemorrhage or midline shift.  2.  Chronic ischemic changes as discussed above.    CTA BRAIN:  1.  Severe multifocal narrowing of the major intracranial vessels as   discussed above.  2.  2-3 mm vascular outpouching at the anterior commuting artery, likely   small saccular aneurysm versus vascular infundibulum.    CTA NECK:  1.  No evidence of critical stenosis by NASCET criteria.  2.  Multiple hypodense nodularity throughout the bilateral lobes of the   thyroid. Recommend nonemergent thyroid ultrasound for further   characterization if clinically indicated."      Per epileptology report of routine EEG:  "FINDINGS:      Background:  Continuity: continuous  Symmetry: asymmetric  PDR: 10.5 Hz, attenuated to eye opening. Low-amplitude frontal beta in wakefulness.  Voltage: normal  Anterior Posterior Gradient: present  Other background findings: none  Breach: absent    Background Slowing:  Generalized slowing: none was present.  Focal slowing: continuous right frontotemporal maximal focal polymorphic delta/theta activity    State Changes:   Drowsiness and stage 2 sleep not captured.    Sporadic Epileptiform Discharges:    None    Rhythmic and Periodic Patterns (RPPs):  None     Electrographic and Electroclinical seizures:  None    Other Clinical Events:  None    Activation Procedures:   -Hyperventilation was not performed.    -Photic stimulation was performed and did not elicit any abnormalities.      Artifacts:  Intermittent myogenic and movement artifacts were present.    Single-lead EKG: Regular rhythm    EEG Classification / Summary:  Abnormal routine EEG in the awake state(s).  -Continuous right frontotemporal maximal focal slowing.  -No epileptiform abnormalities.    Clinical Impression:  -Structural/functional focal cerebral dysfunction in the right frontotemporal region.  -There were no epileptiform abnormalities recorded. "    Consult attending comment: findings consistent with area of ischemic damage.    EXAMINATION    Awake, alert.  Grossly normal comprehension, expression, prosody, articulation.  She names the fingers as big thumb, index, ring finger and baby finger.      On confrontation testing of limb muscles she evidences significant generalized weakness, worse on the L side.  Milagros L hand slow vs. the R.      Reflex                           Right    Left   Comment    Biceps                             1         2  Triceps                            0        0  Patellar                insufficient relaxation  Gastroc               insufficient relaxation  Plantar                        mute   mute     P/LT sensation grossly intact; no extinction on DSS.    Pt not amenable to getting OOB to have gait and station assessed.

## 2024-04-25 NOTE — EEG REPORT - NS EEG TEXT BOX
SYEDA BARBER [id number]     Study Date: 		04-25-24  Duration: 20 min  --------------------------------------------------------------------------------------------------  History:  CC/ HPI Patient is a 78y old  Female who presents with a chief complaint of AMS (24 Apr 2024 16:45)    MEDICATIONS  (STANDING):  No ASM  --------------------------------------------------------------------------------------------------  Study Interpretation:    [[[Abbreviation Key:  PDR=alpha rhythm/posterior dominant rhythm. A-P=anterior posterior.  Amplitude: ‘very low’:<20; ‘low’:20-49; ‘medium’:; ‘high’:>150uV.  Persistence for periodic/rhythmic patterns (% of epoch) ‘rare’:<1%; ‘occasional’:1-10%; ‘frequent’:10-50%; ‘abundant’:50-90%; ‘continuous’:>90%.  Persistence for sporadic discharges: ‘rare’:<1/hr; ‘occasional’:1/min-1/hr; ‘frequent’:>1/min; ‘abundant’:>1/10 sec.  RPP=rhythmic and periodic patterns; GRDA=generalized rhythmic delta activity; FIRDA=frontal intermittent GRDA; LRDA=lateralized rhythmic delta activity; TIRDA=temporal intermittent rhythmic delta activity;  LPD=PLED=lateralized periodic discharges; GPD=generalized periodic discharges; BIPDs =bilateral independent periodic discharges; Mf=multifocal; SIRPDs=stimulus induced rhythmic, periodic, or ictal appearing discharges; BIRDs=brief potentially ictal rhythmic discharges >4 Hz, lasting .5-10s; PFA (paroxysmal bursts >13 Hz or =8 Hz <10s).  Modifiers: +F=with fast component; +S=with spike component; +R=with rhythmic component.  S-B=burst suppression pattern.  Max=maximal. N1-drowsy; N2-stage II sleep; N3-slow wave sleep. SSS/BETS=small sharp spikes/benign epileptiform transients of sleep. HV=hyperventilation; PS=photic stimulation]]]    Daily EEG Visual Analysis    FINDINGS:      Background:  Continuity: continuous  Symmetry: asymmetric  PDR: 10 Hz activity, with amplitude to 40 uV, that attenuated to eye opening.  Low amplitude frontal beta noted in wakefulness.  Reactivity: present  Voltage: normal, mostly 20-150uV  Anterior Posterior Gradient: present  Other background findings: none  Breach: absent    Background Slowing:  Generalized slowing: none was present.  Focal slowing: continuous right frontotemporal maximal delta/theta activity    State Changes:   -Absent    Sporadic Epileptiform Discharges:    None    Rhythmic and Periodic Patterns (RPPs):  None     Electrographic and Electroclinical seizures:  None    Other Clinical Events:  None    Activation Procedures:   -Hyperventilation was not performed.    -Photic stimulation was performed and did not elicit any abnormalities.      Artifacts:  Intermittent myogenic and movement artifacts were noted.    ECG:  The heart rate on single channel ECG was predominantly between 70-80 BPM.    EEG Classification / Summary:  Abnormal  EEG in the awake state(s).  Continuous right frontotemporal maximal delta/theta activity.    Clinical Impression:  Structural/functional cerebral dysfunction seen in the right frontotemporal region.  There were no epileptiform abnormalities recorded.      This is a preliminary fellow impression only pending attending review    Miguel Uriostegui MD - Epilepsy Fellow SYEDA BARBER [id number]     Study Date: 04-25-24  Duration: 20 min  --------------------------------------------------------------------------------------------------  History:  CC/ HPI Patient is a 78y old  Female who presents with a chief complaint of AMS (24 Apr 2024 16:45)    MEDICATIONS  (STANDING):  No ASM  --------------------------------------------------------------------------------------------------  Study Interpretation:    [[[Abbreviation Key:  PDR=alpha rhythm/posterior dominant rhythm. A-P=anterior posterior.  Amplitude: ‘very low’:<20; ‘low’:20-49; ‘medium’:; ‘high’:>150uV.  Persistence for periodic/rhythmic patterns (% of epoch) ‘rare’:<1%; ‘occasional’:1-10%; ‘frequent’:10-50%; ‘abundant’:50-90%; ‘continuous’:>90%.  Persistence for sporadic discharges: ‘rare’:<1/hr; ‘occasional’:1/min-1/hr; ‘frequent’:>1/min; ‘abundant’:>1/10 sec.  RPP=rhythmic and periodic patterns; GRDA=generalized rhythmic delta activity; FIRDA=frontal intermittent GRDA; LRDA=lateralized rhythmic delta activity; TIRDA=temporal intermittent rhythmic delta activity;  LPD=PLED=lateralized periodic discharges; GPD=generalized periodic discharges; BIPDs =bilateral independent periodic discharges; Mf=multifocal; SIRPDs=stimulus induced rhythmic, periodic, or ictal appearing discharges; BIRDs=brief potentially ictal rhythmic discharges >4 Hz, lasting .5-10s; PFA (paroxysmal bursts >13 Hz or =8 Hz <10s).  Modifiers: +F=with fast component; +S=with spike component; +R=with rhythmic component.  S-B=burst suppression pattern.  Max=maximal. N1-drowsy; N2-stage II sleep; N3-slow wave sleep. SSS/BETS=small sharp spikes/benign epileptiform transients of sleep. HV=hyperventilation; PS=photic stimulation]]]    Daily EEG Visual Analysis    FINDINGS:      Background:  Continuity: continuous  Symmetry: asymmetric  PDR: 10.5 Hz, attenuated to eye opening. Low-amplitude frontal beta in wakefulness.  Voltage: normal  Anterior Posterior Gradient: present  Other background findings: none  Breach: absent    Background Slowing:  Generalized slowing: none was present.  Focal slowing: continuous right frontotemporal maximal focal polymorphic delta/theta activity    State Changes:   Drowsiness and stage 2 sleep not captured.    Sporadic Epileptiform Discharges:    None    Rhythmic and Periodic Patterns (RPPs):  None     Electrographic and Electroclinical seizures:  None    Other Clinical Events:  None    Activation Procedures:   -Hyperventilation was not performed.    -Photic stimulation was performed and did not elicit any abnormalities.      Artifacts:  Intermittent myogenic and movement artifacts were present.    Single-lead EKG: Regular rhythm    EEG Classification / Summary:  Abnormal routine EEG in the awake state(s).  -Continuous right frontotemporal maximal focal slowing.  -No epileptiform abnormalities.    Clinical Impression:  -Structural/functional focal cerebral dysfunction in the right frontotemporal region.  -There were no epileptiform abnormalities recorded.      ----------------------------------------------------------------------------    Miguel Uriostegui MD - Epilepsy Fellow    Diane Green MD  Attending Physician, [facility] [place]  negative...

## 2024-04-26 LAB
CULTURE RESULTS: SIGNIFICANT CHANGE UP
GLUCOSE BLDC GLUCOMTR-MCNC: 125 MG/DL — HIGH (ref 70–99)
GLUCOSE BLDC GLUCOMTR-MCNC: 144 MG/DL — HIGH (ref 70–99)
GLUCOSE BLDC GLUCOMTR-MCNC: 168 MG/DL — HIGH (ref 70–99)
GLUCOSE BLDC GLUCOMTR-MCNC: 206 MG/DL — HIGH (ref 70–99)
SPECIMEN SOURCE: SIGNIFICANT CHANGE UP

## 2024-04-26 PROCEDURE — 99232 SBSQ HOSP IP/OBS MODERATE 35: CPT

## 2024-04-26 RX ADMIN — ATORVASTATIN CALCIUM 80 MILLIGRAM(S): 80 TABLET, FILM COATED ORAL at 21:43

## 2024-04-26 RX ADMIN — Medication 81 MILLIGRAM(S): at 11:48

## 2024-04-26 RX ADMIN — LISINOPRIL 40 MILLIGRAM(S): 2.5 TABLET ORAL at 05:33

## 2024-04-26 RX ADMIN — GABAPENTIN 100 MILLIGRAM(S): 400 CAPSULE ORAL at 05:33

## 2024-04-26 RX ADMIN — AMLODIPINE BESYLATE 10 MILLIGRAM(S): 2.5 TABLET ORAL at 05:34

## 2024-04-26 RX ADMIN — Medication 1: at 11:48

## 2024-04-26 RX ADMIN — GABAPENTIN 100 MILLIGRAM(S): 400 CAPSULE ORAL at 17:13

## 2024-04-26 NOTE — PROGRESS NOTE ADULT - SUBJECTIVE AND OBJECTIVE BOX
Patient is a 78y old  Female who presents with a chief complaint of AMS (24 Apr 2024 16:45)      INTERVAL HPI/OVERNIGHT EVENTS: no events noted overnight. Patient was evaluated, spoke w/ daughter who informed that she was talking w/ her on video call when she suddenly started looking upwards and stopped responding, she couldn't tell if her eyes were open or closed. Patient reports that she was not confused afterwards. No prior hx of seizures. No warning signs or prodromal features     MEDICATIONS  (STANDING):  amLODIPine   Tablet 10 milliGRAM(s) Oral daily  aspirin  chewable 81 milliGRAM(s) Oral daily  atorvastatin 80 milliGRAM(s) Oral at bedtime  dextrose 10% Bolus 125 milliLiter(s) IV Bolus once  dextrose 5%. 1000 milliLiter(s) (100 mL/Hr) IV Continuous <Continuous>  dextrose 5%. 1000 milliLiter(s) (50 mL/Hr) IV Continuous <Continuous>  dextrose 50% Injectable 12.5 Gram(s) IV Push once  dextrose 50% Injectable 25 Gram(s) IV Push once  gabapentin 100 milliGRAM(s) Oral two times a day  glucagon  Injectable 1 milliGRAM(s) IntraMuscular once  insulin lispro (ADMELOG) corrective regimen sliding scale   SubCutaneous three times a day before meals  insulin lispro (ADMELOG) corrective regimen sliding scale   SubCutaneous at bedtime  lisinopril 40 milliGRAM(s) Oral daily    MEDICATIONS  (PRN):  acetaminophen     Tablet .. 650 milliGRAM(s) Oral every 6 hours PRN Mild Pain (1 - 3), Moderate Pain (4 - 6)  dextrose Oral Gel 15 Gram(s) Oral once PRN Blood Glucose LESS THAN 70 milliGRAM(s)/deciliter  melatonin 3 milliGRAM(s) Oral at bedtime PRN Insomnia      Vital Signs Last 24 Hrs  T(C): 36.4 (26 Apr 2024 07:00), Max: 36.8 (25 Apr 2024 15:04)  T(F): 97.6 (26 Apr 2024 07:00), Max: 98.3 (25 Apr 2024 17:15)  HR: 74 (26 Apr 2024 07:00) (72 - 98)  BP: 146/66 (26 Apr 2024 07:00) (128/70 - 164/79)  BP(mean): 96 (25 Apr 2024 15:04) (96 - 96)  RR: 16 (26 Apr 2024 07:00) (16 - 18)  SpO2: 96% (26 Apr 2024 07:00) (96% - 98%)    Parameters below as of 26 Apr 2024 04:50  Patient On (Oxygen Delivery Method): room air      ________________________________________________  PHYSICAL EXAM:  GENERAL: NAD  HEENT: Normocephalic;  conjunctivae and sclerae clear; moist mucous membranes;   NECK : supple  CHEST/LUNG: Clear to auscultation bilaterally with good air entry   HEART: S1 S2  regular; no murmurs, gallops or rubs  ABDOMEN: Soft, Nontender, Nondistended; Bowel sounds present  EXTREMITIES: no cyanosis; no edema; no calf tenderness  SKIN: warm and dry; no rash  NERVOUS SYSTEM:  Awake and alert; Oriented  to place, person and time ; no new deficits    _________________________________________________  LABS:                                 11.9   9.47  )-----------( 201      ( 25 Apr 2024 09:07 )             37.8   04-25    139  |  110<H>  |  13  ----------------------------<  160<H>  4.2   |  22  |  0.90    Ca    10.6<H>      25 Apr 2024 09:07  Phos  2.4     04-25  Mg     1.9     04-25    TPro  8.6<H>  /  Alb  3.6  /  TBili  0.8  /  DBili  x   /  AST  22  /  ALT  19  /  AlkPhos  73  04-25          CAPILLARY BLOOD GLUCOSE    CAPILLARY BLOOD GLUCOSE      POCT Blood Glucose.: 168 mg/dL (26 Apr 2024 10:53)  POCT Blood Glucose.: 144 mg/dL (26 Apr 2024 07:40)  POCT Blood Glucose.: 201 mg/dL (25 Apr 2024 20:58)  POCT Blood Glucose.: 179 mg/dL (25 Apr 2024 18:23)        RADIOLOGY & ADDITIONAL TESTS:  < from: US Thyroid (04.25.24 @ 11:16) >  IMPRESSION:    Scattered benign-appearing thyroid colloid cysts. There are no suspicious   lesions.    Please note the on the prior CT of 04/24/2024 there is 1 cm hyperdense   nodule located inferior and posterior to the lower pole of the right   lobe, right paraesophageal in location. This nodule was not identified on   today's exam but may represent parathyroid adenoma. Correlation with   serum calcium and parathyroid hormone levels is recommended.    TI-RAD 1: Benign (No FNA)  __________________________________    < end of copied text >  < from: US Duplex Carotid Arteries Complete, Bilateral (04.24.24 @ 17:02) >    IMPRESSION:    No significant hemodynamic stenosis of either carotid artery.    < end of copied text >  < from: Xray Chest 2 Views PA/Lat (04.24.24 @ 12:18) >    IMPRESSION: Clear lungs with no acute cardiopulmonary abnormalities.    < end of copied text >  < from: CT Angio Neck w/ IV Cont (04.24.24 @ 11:23) >    CT HEAD:  1.  No evidence of acute intracranial hemorrhage or midline shift.  2.  Chronic ischemic changes as discussed above.    CTA BRAIN:  1.  Severe multifocal narrowing of the major intracranial vessels as   discussed above.  2.  2-3 mm vascular outpouching at the anterior commuting artery, likely   small saccular aneurysm versus vascular infundibulum.    CTA NECK:  1.  No evidence of critical stenosis by NASCET criteria.  2.  Multiple hypodense nodularity throughout the bilateral lobes of the   thyroid. Recommend nonemergent thyroid ultrasound for further   characterization if clinically indicated.    < end of copied text >      Imaging Personally Reviewed:  YES    Consultant(s) Notes Reviewed:   YES    Care Discussed with Consultants : YES     Plan of care was discussed with patient and /or primary care giver; all questions and concerns were addressed and care was aligned with patient's wishes.

## 2024-04-26 NOTE — DIETITIAN INITIAL EVALUATION ADULT - ENERGY INTAKE
Pt reports poor appetite and PO intake during LOS; amenable to Glucerna x 2/day (provides 440 kcal, 20 g protein)

## 2024-04-26 NOTE — DIETITIAN INITIAL EVALUATION ADULT - PERTINENT MEDS FT
MEDICATIONS  (STANDING):  amLODIPine   Tablet 10 milliGRAM(s) Oral daily  aspirin  chewable 81 milliGRAM(s) Oral daily  atorvastatin 80 milliGRAM(s) Oral at bedtime  dextrose 10% Bolus 125 milliLiter(s) IV Bolus once  dextrose 5%. 1000 milliLiter(s) (100 mL/Hr) IV Continuous <Continuous>  dextrose 5%. 1000 milliLiter(s) (50 mL/Hr) IV Continuous <Continuous>  dextrose 50% Injectable 12.5 Gram(s) IV Push once  dextrose 50% Injectable 25 Gram(s) IV Push once  gabapentin 100 milliGRAM(s) Oral two times a day  glucagon  Injectable 1 milliGRAM(s) IntraMuscular once  insulin lispro (ADMELOG) corrective regimen sliding scale   SubCutaneous three times a day before meals  insulin lispro (ADMELOG) corrective regimen sliding scale   SubCutaneous at bedtime  lisinopril 40 milliGRAM(s) Oral daily    MEDICATIONS  (PRN):  acetaminophen     Tablet .. 650 milliGRAM(s) Oral every 6 hours PRN Mild Pain (1 - 3), Moderate Pain (4 - 6)  dextrose Oral Gel 15 Gram(s) Oral once PRN Blood Glucose LESS THAN 70 milliGRAM(s)/deciliter  melatonin 3 milliGRAM(s) Oral at bedtime PRN Insomnia

## 2024-04-26 NOTE — DIETITIAN INITIAL EVALUATION ADULT - ORAL INTAKE PTA/DIET HISTORY
Pt reports poor PO intake x 6 months PTA. Reports no diet restrictions at home and no known food allergies- noted on chart pt with shellfish allergy however pt reports she consumes shrimp at home with no issues. Reports she eats only a little bit throughout the day and likes green juices.

## 2024-04-26 NOTE — DIETITIAN INITIAL EVALUATION ADULT - PERTINENT LABORATORY DATA
04-25    139  |  110<H>  |  13  ----------------------------<  160<H>  4.2   |  22  |  0.90    Ca    10.6<H>      25 Apr 2024 09:07  Phos  2.4     04-25  Mg     1.9     04-25    TPro  8.6<H>  /  Alb  3.6  /  TBili  0.8  /  DBili  x   /  AST  22  /  ALT  19  /  AlkPhos  73  04-25  POCT Blood Glucose.: 168 mg/dL (04-26-24 @ 10:53)  A1C with Estimated Average Glucose Result: 7.4 % (04-25-24 @ 09:07)  A1C with Estimated Average Glucose Result: 8.4 % (12-09-23 @ 07:45)

## 2024-04-26 NOTE — DIETITIAN INITIAL EVALUATION ADULT - OTHER INFO
Denies difficulty chewing/swallowing. Reports unsure of weight changes; states unsure of UBW. Per Surjit ESTRADA pt weight history as follows: (12/09/2023) 68kg (06/26/2023) 71.8kg (05/15/2023) 70.8kg indicating weight stable x 11 months.  Pt with T2DM; states Janumet and repaglinide PTA. HbA1c 7.4% indicates fair blood glucose management.  Pt educated on using nutritional supplement, and choosing nutrient dense foods first to optimize PO intake. Also reinforced the Plate Method for blood glucose management- pt verbalized understanding. Made aware RD remains available.

## 2024-04-26 NOTE — DIETITIAN INITIAL EVALUATION ADULT - FLUID ACCUMULATION
Pre-Visit Chart Review  For Appointment Scheduled on 11-20-19    There are no preventive care reminders to display for this patient.                 No noted edema as per flow sheets.

## 2024-04-26 NOTE — DIETITIAN INITIAL EVALUATION ADULT - NS FNS DIET ORDER
Diet, DASH/TLC:   Sodium & Cholesterol Restricted  Consistent Carbohydrate {No Snacks} (04-24-24 @ 16:04) [Active]

## 2024-04-27 LAB
ANION GAP SERPL CALC-SCNC: 6 MMOL/L — SIGNIFICANT CHANGE UP (ref 5–17)
BUN SERPL-MCNC: 20 MG/DL — SIGNIFICANT CHANGE UP (ref 7–23)
CALCIUM SERPL-MCNC: 10.4 MG/DL — HIGH (ref 8.5–10.1)
CALCIUM SERPL-MCNC: 11.2 MG/DL — HIGH (ref 8.4–10.5)
CHLORIDE SERPL-SCNC: 111 MMOL/L — HIGH (ref 96–108)
CO2 SERPL-SCNC: 24 MMOL/L — SIGNIFICANT CHANGE UP (ref 22–31)
CREAT SERPL-MCNC: 0.78 MG/DL — SIGNIFICANT CHANGE UP (ref 0.5–1.3)
EGFR: 78 ML/MIN/1.73M2 — SIGNIFICANT CHANGE UP
GLUCOSE BLDC GLUCOMTR-MCNC: 155 MG/DL — HIGH (ref 70–99)
GLUCOSE BLDC GLUCOMTR-MCNC: 190 MG/DL — HIGH (ref 70–99)
GLUCOSE BLDC GLUCOMTR-MCNC: 275 MG/DL — HIGH (ref 70–99)
GLUCOSE BLDC GLUCOMTR-MCNC: 99 MG/DL — SIGNIFICANT CHANGE UP (ref 70–99)
GLUCOSE SERPL-MCNC: 166 MG/DL — HIGH (ref 70–99)
HCT VFR BLD CALC: 35.2 % — SIGNIFICANT CHANGE UP (ref 34.5–45)
HGB BLD-MCNC: 11.3 G/DL — LOW (ref 11.5–15.5)
MAGNESIUM SERPL-MCNC: 1.7 MG/DL — SIGNIFICANT CHANGE UP (ref 1.6–2.6)
MCHC RBC-ENTMCNC: 28.8 PG — SIGNIFICANT CHANGE UP (ref 27–34)
MCHC RBC-ENTMCNC: 32.1 G/DL — SIGNIFICANT CHANGE UP (ref 32–36)
MCV RBC AUTO: 89.8 FL — SIGNIFICANT CHANGE UP (ref 80–100)
NRBC # BLD: 0 /100 WBCS — SIGNIFICANT CHANGE UP (ref 0–0)
PHOSPHATE SERPL-MCNC: 2.7 MG/DL — SIGNIFICANT CHANGE UP (ref 2.5–4.5)
PLATELET # BLD AUTO: 191 K/UL — SIGNIFICANT CHANGE UP (ref 150–400)
POTASSIUM SERPL-MCNC: 4.1 MMOL/L — SIGNIFICANT CHANGE UP (ref 3.5–5.3)
POTASSIUM SERPL-SCNC: 4.1 MMOL/L — SIGNIFICANT CHANGE UP (ref 3.5–5.3)
PTH-INTACT FLD-MCNC: 90 PG/ML — HIGH (ref 15–65)
RBC # BLD: 3.92 M/UL — SIGNIFICANT CHANGE UP (ref 3.8–5.2)
RBC # FLD: 14.3 % — SIGNIFICANT CHANGE UP (ref 10.3–14.5)
SODIUM SERPL-SCNC: 141 MMOL/L — SIGNIFICANT CHANGE UP (ref 135–145)
WBC # BLD: 8.34 K/UL — SIGNIFICANT CHANGE UP (ref 3.8–10.5)
WBC # FLD AUTO: 8.34 K/UL — SIGNIFICANT CHANGE UP (ref 3.8–10.5)

## 2024-04-27 PROCEDURE — 99232 SBSQ HOSP IP/OBS MODERATE 35: CPT

## 2024-04-27 PROCEDURE — 70551 MRI BRAIN STEM W/O DYE: CPT | Mod: 26

## 2024-04-27 RX ORDER — LEVETIRACETAM 250 MG/1
250 TABLET, FILM COATED ORAL
Refills: 0 | Status: DISCONTINUED | OUTPATIENT
Start: 2024-04-27 | End: 2024-04-28

## 2024-04-27 RX ADMIN — AMLODIPINE BESYLATE 10 MILLIGRAM(S): 2.5 TABLET ORAL at 06:50

## 2024-04-27 RX ADMIN — Medication 3: at 11:36

## 2024-04-27 RX ADMIN — Medication 1: at 08:14

## 2024-04-27 RX ADMIN — ATORVASTATIN CALCIUM 80 MILLIGRAM(S): 80 TABLET, FILM COATED ORAL at 21:41

## 2024-04-27 RX ADMIN — LISINOPRIL 40 MILLIGRAM(S): 2.5 TABLET ORAL at 06:50

## 2024-04-27 RX ADMIN — LEVETIRACETAM 250 MILLIGRAM(S): 250 TABLET, FILM COATED ORAL at 17:19

## 2024-04-27 RX ADMIN — GABAPENTIN 100 MILLIGRAM(S): 400 CAPSULE ORAL at 06:50

## 2024-04-27 RX ADMIN — GABAPENTIN 100 MILLIGRAM(S): 400 CAPSULE ORAL at 17:19

## 2024-04-27 RX ADMIN — Medication 81 MILLIGRAM(S): at 11:35

## 2024-04-27 NOTE — PROGRESS NOTE ADULT - SUBJECTIVE AND OBJECTIVE BOX
Neurology Progress Note    No acute events overnight.  Never had symptoms like this before.     Neuro Exam: AOx3. Follows commands. No dysarthria. No facial droop. PERRL. Moving all four extremities.     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

## 2024-04-27 NOTE — PROGRESS NOTE ADULT - SUBJECTIVE AND OBJECTIVE BOX
Patient is a 78y old  Female who presents with a chief complaint of AMS (24 Apr 2024 16:45)      INTERVAL HPI/OVERNIGHT EVENTS: no events noted overnight. Patient was evaluated, spoke w/ daughter who informed that she was talking w/ her on video call when she suddenly started looking upwards and stopped responding, she couldn't tell if her eyes were open or closed. Patient reports that she was not confused afterwards. No prior hx of seizures. No warning signs or prodromal features     MEDICATIONS  (STANDING):  amLODIPine   Tablet 10 milliGRAM(s) Oral daily  aspirin  chewable 81 milliGRAM(s) Oral daily  atorvastatin 80 milliGRAM(s) Oral at bedtime  dextrose 10% Bolus 125 milliLiter(s) IV Bolus once  dextrose 5%. 1000 milliLiter(s) (100 mL/Hr) IV Continuous <Continuous>  dextrose 5%. 1000 milliLiter(s) (50 mL/Hr) IV Continuous <Continuous>  dextrose 50% Injectable 12.5 Gram(s) IV Push once  dextrose 50% Injectable 25 Gram(s) IV Push once  gabapentin 100 milliGRAM(s) Oral two times a day  glucagon  Injectable 1 milliGRAM(s) IntraMuscular once  insulin lispro (ADMELOG) corrective regimen sliding scale   SubCutaneous three times a day before meals  insulin lispro (ADMELOG) corrective regimen sliding scale   SubCutaneous at bedtime  lisinopril 40 milliGRAM(s) Oral daily    MEDICATIONS  (PRN):  acetaminophen     Tablet .. 650 milliGRAM(s) Oral every 6 hours PRN Mild Pain (1 - 3), Moderate Pain (4 - 6)  dextrose Oral Gel 15 Gram(s) Oral once PRN Blood Glucose LESS THAN 70 milliGRAM(s)/deciliter  melatonin 3 milliGRAM(s) Oral at bedtime PRN Insomnia    Vital Signs Last 24 Hrs  T(C): 36.8 (27 Apr 2024 10:50), Max: 37.1 (26 Apr 2024 16:30)  T(F): 98.3 (27 Apr 2024 10:50), Max: 98.7 (26 Apr 2024 16:30)  HR: 93 (27 Apr 2024 10:50) (69 - 97)  BP: 142/84 (27 Apr 2024 10:50) (114/69 - 149/79)  BP(mean): 102 (26 Apr 2024 16:30) (102 - 102)  RR: 18 (27 Apr 2024 10:50) (16 - 18)  SpO2: 97% (27 Apr 2024 10:50) (95% - 99%)    Parameters below as of 27 Apr 2024 10:50  Patient On (Oxygen Delivery Method): room air        ______________________________________________  PHYSICAL EXAM:  GENERAL: NAD  HEENT: Normocephalic;  conjunctivae and sclerae clear; moist mucous membranes;   NECK : supple  CHEST/LUNG: Clear to auscultation bilaterally with good air entry   HEART: S1 S2  regular; no murmurs, gallops or rubs  ABDOMEN: Soft, Nontender, Nondistended; Bowel sounds present  EXTREMITIES: no cyanosis; no edema; no calf tenderness  SKIN: warm and dry; no rash  NERVOUS SYSTEM:  Awake and alert; Oriented  to place, person and time ; no new deficits    _________________________________________________  LABS:                                            11.3   8.34  )-----------( 191      ( 27 Apr 2024 06:46 )             35.2   04-27    141  |  111<H>  |  20  ----------------------------<  166<H>  4.1   |  24  |  0.78    Ca    10.4<H>      27 Apr 2024 06:46  Phos  2.7     04-27  Mg     1.7     04-27      Parathyroid Hormone Intact, Serum (04.26.24 @ 14:00)    Calcium.: 11.2: Test Repeated mg/dL   Intact PTH: 90: PTH METHOD: Roche  Guide for Interpretation of PTH and Calcium Results                           Calcium             PTH                           MG/DL               PG/ML  Normal                   8.4-10.5            15-65  Primary  Hyperparathyroidism      >10.5               >50  Non-PTH Hypercalcemia    >10.5               0-20  Hypoparathyroidism       <8.4                0-20  Pseudohypoparathyroid    <8.4                >50  This is intended as a guide only. Factors such as sunlight exposure,  Vitamin D status and renal function should be evaluated along with  clinical presentation. pg/mL        CAPILLARY BLOOD GLUCOSE      POCT Blood Glucose.: 275 mg/dL (27 Apr 2024 11:04)  POCT Blood Glucose.: 190 mg/dL (27 Apr 2024 07:52)  POCT Blood Glucose.: 206 mg/dL (26 Apr 2024 21:24)  POCT Blood Glucose.: 125 mg/dL (26 Apr 2024 16:53)          RADIOLOGY & ADDITIONAL TESTS:  < from: US Thyroid (04.25.24 @ 11:16) >  IMPRESSION:    Scattered benign-appearing thyroid colloid cysts. There are no suspicious   lesions.    Please note the on the prior CT of 04/24/2024 there is 1 cm hyperdense   nodule located inferior and posterior to the lower pole of the right   lobe, right paraesophageal in location. This nodule was not identified on   today's exam but may represent parathyroid adenoma. Correlation with   serum calcium and parathyroid hormone levels is recommended.    TI-RAD 1: Benign (No FNA)  __________________________________    < end of copied text >  < from: US Duplex Carotid Arteries Complete, Bilateral (04.24.24 @ 17:02) >    IMPRESSION:    No significant hemodynamic stenosis of either carotid artery.    < end of copied text >  < from: Xray Chest 2 Views PA/Lat (04.24.24 @ 12:18) >    IMPRESSION: Clear lungs with no acute cardiopulmonary abnormalities.    < end of copied text >  < from: CT Angio Neck w/ IV Cont (04.24.24 @ 11:23) >    CT HEAD:  1.  No evidence of acute intracranial hemorrhage or midline shift.  2.  Chronic ischemic changes as discussed above.    CTA BRAIN:  1.  Severe multifocal narrowing of the major intracranial vessels as   discussed above.  2.  2-3 mm vascular outpouching at the anterior commuting artery, likely   small saccular aneurysm versus vascular infundibulum.    CTA NECK:  1.  No evidence of critical stenosis by NASCET criteria.  2.  Multiple hypodense nodularity throughout the bilateral lobes of the   thyroid. Recommend nonemergent thyroid ultrasound for further   characterization if clinically indicated.    < end of copied text >      Imaging Personally Reviewed:  YES    Consultant(s) Notes Reviewed:   YES    Care Discussed with Consultants : YES     Plan of care was discussed with patient and /or primary care giver; all questions and concerns were addressed and care was aligned with patient's wishes.

## 2024-04-28 DIAGNOSIS — E21.0 PRIMARY HYPERPARATHYROIDISM: ICD-10-CM

## 2024-04-28 LAB
GLUCOSE BLDC GLUCOMTR-MCNC: 141 MG/DL — HIGH (ref 70–99)
GLUCOSE BLDC GLUCOMTR-MCNC: 153 MG/DL — HIGH (ref 70–99)
GLUCOSE BLDC GLUCOMTR-MCNC: 165 MG/DL — HIGH (ref 70–99)
GLUCOSE BLDC GLUCOMTR-MCNC: 235 MG/DL — HIGH (ref 70–99)

## 2024-04-28 PROCEDURE — 99232 SBSQ HOSP IP/OBS MODERATE 35: CPT

## 2024-04-28 RX ORDER — LEVETIRACETAM 250 MG/1
250 TABLET, FILM COATED ORAL ONCE
Refills: 0 | Status: COMPLETED | OUTPATIENT
Start: 2024-04-28 | End: 2024-04-28

## 2024-04-28 RX ORDER — LEVETIRACETAM 250 MG/1
500 TABLET, FILM COATED ORAL
Refills: 0 | Status: DISCONTINUED | OUTPATIENT
Start: 2024-04-29 | End: 2024-04-29

## 2024-04-28 RX ADMIN — Medication 81 MILLIGRAM(S): at 11:35

## 2024-04-28 RX ADMIN — GABAPENTIN 100 MILLIGRAM(S): 400 CAPSULE ORAL at 06:32

## 2024-04-28 RX ADMIN — LEVETIRACETAM 250 MILLIGRAM(S): 250 TABLET, FILM COATED ORAL at 06:35

## 2024-04-28 RX ADMIN — ATORVASTATIN CALCIUM 80 MILLIGRAM(S): 80 TABLET, FILM COATED ORAL at 21:40

## 2024-04-28 RX ADMIN — Medication 2: at 11:37

## 2024-04-28 RX ADMIN — LEVETIRACETAM 250 MILLIGRAM(S): 250 TABLET, FILM COATED ORAL at 18:59

## 2024-04-28 RX ADMIN — LEVETIRACETAM 250 MILLIGRAM(S): 250 TABLET, FILM COATED ORAL at 17:34

## 2024-04-28 RX ADMIN — GABAPENTIN 100 MILLIGRAM(S): 400 CAPSULE ORAL at 17:33

## 2024-04-28 RX ADMIN — Medication 1: at 17:34

## 2024-04-28 NOTE — CONSULT NOTE ADULT - PROBLEM SELECTOR RECOMMENDATION 9
Patient has primary hyperparathyroidism proven by the blood test she has had.  At her age FHH (familial hypocalciuric hypercalcemia) is extremely unlikely and should be sought after only if thyroid investigations for primary hyperparathyroidism does not prove the diagnosis.  Any workup for primary hyperparathyroidism including parathyroid sonogram, sestamibi technetium dual scan can be done as an outpatient.  Patient has increased calcium but it is <12.0 and has been having a downward trend so there is no acute indication for workup of hyperparathyroidism.  Calcium also is stable as stated above  Thank you for the courtesy of this consultation

## 2024-04-28 NOTE — PROGRESS NOTE ADULT - SUBJECTIVE AND OBJECTIVE BOX
Patient is a 78y old  Female who presents with a chief complaint of AMS (24 Apr 2024 16:45)      INTERVAL HPI/OVERNIGHT EVENTS: no events noted overnight. Patient was evaluated, spoke w/ daughter who informed that she was talking w/ her on video call when she suddenly started looking upwards and stopped responding, she couldn't tell if her eyes were open or closed. Patient reports that she was not confused afterwards. No prior hx of seizures. No warning signs or prodromal features     MEDICATIONS  (STANDING):  amLODIPine   Tablet 10 milliGRAM(s) Oral daily  aspirin  chewable 81 milliGRAM(s) Oral daily  atorvastatin 80 milliGRAM(s) Oral at bedtime  dextrose 10% Bolus 125 milliLiter(s) IV Bolus once  dextrose 5%. 1000 milliLiter(s) (100 mL/Hr) IV Continuous <Continuous>  dextrose 5%. 1000 milliLiter(s) (50 mL/Hr) IV Continuous <Continuous>  dextrose 50% Injectable 12.5 Gram(s) IV Push once  dextrose 50% Injectable 25 Gram(s) IV Push once  gabapentin 100 milliGRAM(s) Oral two times a day  glucagon  Injectable 1 milliGRAM(s) IntraMuscular once  insulin lispro (ADMELOG) corrective regimen sliding scale   SubCutaneous three times a day before meals  insulin lispro (ADMELOG) corrective regimen sliding scale   SubCutaneous at bedtime  levETIRAcetam 250 milliGRAM(s) Oral two times a day  lisinopril 40 milliGRAM(s) Oral daily    MEDICATIONS  (PRN):  acetaminophen     Tablet .. 650 milliGRAM(s) Oral every 6 hours PRN Mild Pain (1 - 3), Moderate Pain (4 - 6)  dextrose Oral Gel 15 Gram(s) Oral once PRN Blood Glucose LESS THAN 70 milliGRAM(s)/deciliter  melatonin 3 milliGRAM(s) Oral at bedtime PRN Insomnia    Vital Signs Last 24 Hrs  T(C): 36.5 (28 Apr 2024 04:52), Max: 36.8 (27 Apr 2024 16:21)  T(F): 97.7 (28 Apr 2024 04:52), Max: 98.2 (27 Apr 2024 16:21)  HR: 90 (28 Apr 2024 06:29) (72 - 90)  BP: 118/76 (28 Apr 2024 06:29) (114/68 - 145/80)  BP(mean): 86 (27 Apr 2024 16:21) (86 - 86)  RR: 18 (28 Apr 2024 04:52) (17 - 18)  SpO2: 99% (28 Apr 2024 04:52) (97% - 100%)    Parameters below as of 28 Apr 2024 04:52  Patient On (Oxygen Delivery Method): room air      ______________________________________________  PHYSICAL EXAM:  GENERAL: NAD  HEENT: Normocephalic;  conjunctivae and sclerae clear; moist mucous membranes;   NECK : supple  CHEST/LUNG: Clear to auscultation bilaterally with good air entry   HEART: S1 S2  regular; no murmurs, gallops or rubs  ABDOMEN: Soft, Nontender, Nondistended; Bowel sounds present  EXTREMITIES: no cyanosis; no edema; no calf tenderness  SKIN: warm and dry; no rash  NERVOUS SYSTEM:  Awake and alert; Oriented  to place, person and time ; no new deficits    _________________________________________________  LABS:                                                     11.3   8.34  )-----------( 191      ( 27 Apr 2024 06:46 )             35.2   04-27    141  |  111<H>  |  20  ----------------------------<  166<H>  4.1   |  24  |  0.78    Ca    10.4<H>      27 Apr 2024 06:46  Phos  2.7     04-27  Mg     1.7     04-27      Parathyroid Hormone Intact, Serum (04.26.24 @ 14:00)    Calcium.: 11.2: Test Repeated mg/dL   Intact PTH: 90: PTH METHOD: Roche  Guide for Interpretation of PTH and Calcium Results                           Calcium             PTH                           MG/DL               PG/ML  Normal                   8.4-10.5            15-65  Primary  Hyperparathyroidism      >10.5               >50  Non-PTH Hypercalcemia    >10.5               0-20  Hypoparathyroidism       <8.4                0-20  Pseudohypoparathyroid    <8.4                >50  This is intended as a guide only. Factors such as sunlight exposure,  Vitamin D status and renal function should be evaluated along with  clinical presentation. pg/mL    A1C with Estimated Average Glucose (04.25.24 @ 09:07)    A1C with Estimated Average Glucose Result: 7.4: Method: Immunoassay       Reference Range                4.0-5.6%       High risk (prediabetic)        5.7-6.4%       Diabetic, diagnostic             >=6.5%       ADA diabetic treatment goal       <7.0%  The Hemoglobin A1c testing is NGSP-certified.Reference ranges are based  upon the 2010 recommendations of  the American Diabetes Association.  Interpretation may vary for children  and adolescents. %   Estimated Average Glucose: 166: The Estimated Average Glucose (eAG) or Mean Plasma Glucose (MPG) value is  calculated from the hemoglobin A1c value and covers the same time period.   The American Diabetes Association (ADA) and other professional  organizations recommend reporting the eAG with the HgbA1c. mg/dL        CAPILLARY BLOOD GLUCOSE    CAPILLARY BLOOD GLUCOSE      POCT Blood Glucose.: 141 mg/dL (28 Apr 2024 07:57)  POCT Blood Glucose.: 155 mg/dL (27 Apr 2024 21:28)  POCT Blood Glucose.: 99 mg/dL (27 Apr 2024 16:46)        RADIOLOGY & ADDITIONAL TESTS:    < from: MR Head No Cont (04.27.24 @ 14:09) >    IMPRESSION:    No acute cerebral ischemia, intracranial hemorrhages or mass effect.    Right frontal lobe encephalomalacia, sequela of remote infarction.    Chronic senescent findings.      < end of copied text >    < from: US Thyroid (04.25.24 @ 11:16) >  IMPRESSION:    Scattered benign-appearing thyroid colloid cysts. There are no suspicious   lesions.    Please note the on the prior CT of 04/24/2024 there is 1 cm hyperdense   nodule located inferior and posterior to the lower pole of the right   lobe, right paraesophageal in location. This nodule was not identified on   today's exam but may represent parathyroid adenoma. Correlation with   serum calcium and parathyroid hormone levels is recommended.    TI-RAD 1: Benign (No FNA)  __________________________________    < end of copied text >  < from: US Duplex Carotid Arteries Complete, Bilateral (04.24.24 @ 17:02) >    IMPRESSION:    No significant hemodynamic stenosis of either carotid artery.    < end of copied text >  < from: Xray Chest 2 Views PA/Lat (04.24.24 @ 12:18) >    IMPRESSION: Clear lungs with no acute cardiopulmonary abnormalities.    < end of copied text >  < from: CT Angio Neck w/ IV Cont (04.24.24 @ 11:23) >    CT HEAD:  1.  No evidence of acute intracranial hemorrhage or midline shift.  2.  Chronic ischemic changes as discussed above.    CTA BRAIN:  1.  Severe multifocal narrowing of the major intracranial vessels as   discussed above.  2.  2-3 mm vascular outpouching at the anterior commuting artery, likely   small saccular aneurysm versus vascular infundibulum.    CTA NECK:  1.  No evidence of critical stenosis by NASCET criteria.  2.  Multiple hypodense nodularity throughout the bilateral lobes of the   thyroid. Recommend nonemergent thyroid ultrasound for further   characterization if clinically indicated.    < end of copied text >      Imaging Personally Reviewed:  YES    Consultant(s) Notes Reviewed:   YES    Care Discussed with Consultants : YES     Plan of care was discussed with patient and /or primary care giver; all questions and concerns were addressed and care was aligned with patient's wishes.

## 2024-04-28 NOTE — PROGRESS NOTE ADULT - SUBJECTIVE AND OBJECTIVE BOX
Neurology Progress Note    No acute events overnight.     Neuro Exam: AOx3. Follows commands. No dysarthria. No facial droop. PERRL. Moving all four extremities.     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  - discussed with family at bedside. Neurology Progress Note    No acute events overnight.     Neuro Exam: AOx3. Follows commands. No dysarthria. No facial droop. PERRL. Moving all four extremities.     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 She was asked to hold off driving for a few months (i.e. 3 months)  - MRI brain reviewed with patient  - continue aspirin and statin for secondary stroke prevention  - will need outpatient Neurovascular consult for multiple intracranial atherosclerosis  - follow up in our office for management of seizure medication.  - discussed with family at bedside.

## 2024-04-28 NOTE — CONSULT NOTE ADULT - SUBJECTIVE AND OBJECTIVE BOX
Patient is a 78y old  Female who presents with a chief complaint of AMS (28 Apr 2024 17:29)      Reason For Consult: Primary Hyperparathyroidism     HPI:  78 years old female with h/o HTN, HLD, DM, h/o right frontal infarct in 12/2023 was brought in to ED with transient unresponsiveness. While talking with daughter over the phone with merry, daughter noted that patient had episode of unresponsiveness with eye rolling upward. Unclear how long it lasted, likely for a few minutes. No loss of bladder or bowel control. Patient cannot recall the event  slightly hypertensive, afebrile, sat well at [facility]. CT head with no acute pathology. Chronic ischemic changes. CTA with multifocal narrowing of major intracranial vessels, 2-3mm vascular outputching of anterior commuting artery ( similar to prior, ED discussed with radiologist). Multiple hypodense nodularity throughout the bilateral lobes of the thyroid.    SH: no toxic habits  FH: HTN, DM (24 Apr 2024 16:45)  Patient has HbA1c of 7.4 and has normal thyroid function tests.  Calcium on 2 occasions was 11.2 and 10.6 but PTH intact is 90.  She has no prior history of either thyroid or parathyroid condition    PAST MEDICAL & SURGICAL HISTORY:  DM (diabetes mellitus)      Mild HTN      HLD (hyperlipidemia)          FAMILY HISTORY:        Social History:    MEDICATIONS  (STANDING):  amLODIPine   Tablet 10 milliGRAM(s) Oral daily  aspirin  chewable 81 milliGRAM(s) Oral daily  atorvastatin 80 milliGRAM(s) Oral at bedtime  dextrose 10% Bolus 125 milliLiter(s) IV Bolus once  dextrose 5%. 1000 milliLiter(s) (100 mL/Hr) IV Continuous <Continuous>  dextrose 5%. 1000 milliLiter(s) (50 mL/Hr) IV Continuous <Continuous>  dextrose 50% Injectable 12.5 Gram(s) IV Push once  dextrose 50% Injectable 25 Gram(s) IV Push once  gabapentin 100 milliGRAM(s) Oral two times a day  glucagon  Injectable 1 milliGRAM(s) IntraMuscular once  insulin lispro (ADMELOG) corrective regimen sliding scale   SubCutaneous three times a day before meals  insulin lispro (ADMELOG) corrective regimen sliding scale   SubCutaneous at bedtime  lisinopril 40 milliGRAM(s) Oral daily    MEDICATIONS  (PRN):  acetaminophen     Tablet .. 650 milliGRAM(s) Oral every 6 hours PRN Mild Pain (1 - 3), Moderate Pain (4 - 6)  dextrose Oral Gel 15 Gram(s) Oral once PRN Blood Glucose LESS THAN 70 milliGRAM(s)/deciliter  melatonin 3 milliGRAM(s) Oral at bedtime PRN Insomnia      REVIEW OF SYSTEMS:  CONSTITUTIONAL:  as per HPI  HEENT:  Eyes:  No diplopia or blurred vision.   ENT:  No earache, sore throat or runny nose.  CARDIOVASCULAR:  No chest pain .  RESPIRATORY:  No cough, shortness of breath, PND or orthopnea.  GASTROINTESTINAL:  No nausea, vomiting or diarrhea.  GENITOURINARY:  No dysuria, frequency or urgency. No Blood in urine  MUSCULOSKELETAL:  no joint aches, no muscle pain, myalgia  SKIN:  No change in skin, hair or nails.  NEUROLOGIC:  No paresthesias, fasciculations, seizures or weakness.  PSYCHIATRIC:  No disorder of thought or mood.  ENDOCRINE:  No heat or cold intolerance, polyuria or polydipsia. abnormal weight gain or loss, oral thrush  HEMATOLOGICAL:  No easy bruising or bleeding.     T(C): 36.7 (04-28-24 @ 16:52), Max: 36.7 (04-28-24 @ 10:28)  HR: 77 (04-28-24 @ 16:52) (69 - 90)  BP: 130/78 (04-28-24 @ 16:52) (114/68 - 145/80)  RR: 16 (04-28-24 @ 16:52) (16 - 18)  SpO2: 97% (04-28-24 @ 16:52) (96% - 100%)  Wt(kg): --    PHYSICAL EXAM:  GENERAL: NAD, well-groomed, well-developed  HEAD:  Atraumatic, Normocephalic  EYES: PERRLA, conjunctiva and sclera clear  ENMT: No  exudates,, Moist mucous membranes,, No lesions  NECK: Supple, No JVD,   NERVOUS SYSTEM:  Alert & Oriented   CHEST/LUNG: Clear to percussion bilaterally; No rales, rhonchi, wheezing, or rubs  HEART: Regular rate and rhythm; No murmurs, rubs, or gallops  ABDOMEN: Soft, Nontender, Nondistended; Bowel sounds present  EXTREMITIES:  2+ Peripheral Pulses, No clubbing, cyanosis, or edema  LYMPH: No lymphadenopathy noted  SKIN: No rashes or lesions    CAPILLARY BLOOD GLUCOSE      POCT Blood Glucose.: 153 mg/dL (28 Apr 2024 17:06)  POCT Blood Glucose.: 235 mg/dL (28 Apr 2024 11:15)  POCT Blood Glucose.: 141 mg/dL (28 Apr 2024 07:57)  POCT Blood Glucose.: 155 mg/dL (27 Apr 2024 21:28)                            11.3   8.34  )-----------( 191      ( 27 Apr 2024 06:46 )             35.2       CMP:  04-27 @ 06:46  SGPT --  Albumin --   Alk Phos --   Anion Gap 6   SGOT --   Total Bili --   BUN 20   Calcium Total 10.4   CO2 24   Chloride 111   Creatinine 0.78   eGFR if AA --   eGFR if non AA --   Glucose 166   Potassium 4.1   Protein --   Sodium 141      Thyroid Function Tests:      Diabetes Tests:     Parathyroids:     Adrenals:       Radiology:

## 2024-04-29 ENCOUNTER — TRANSCRIPTION ENCOUNTER (OUTPATIENT)
Age: 79
End: 2024-04-29

## 2024-04-29 VITALS
DIASTOLIC BLOOD PRESSURE: 74 MMHG | HEART RATE: 79 BPM | TEMPERATURE: 98 F | OXYGEN SATURATION: 97 % | RESPIRATION RATE: 18 BRPM | SYSTOLIC BLOOD PRESSURE: 131 MMHG

## 2024-04-29 LAB
GLUCOSE BLDC GLUCOMTR-MCNC: 144 MG/DL — HIGH (ref 70–99)
GLUCOSE BLDC GLUCOMTR-MCNC: 147 MG/DL — HIGH (ref 70–99)
GLUCOSE BLDC GLUCOMTR-MCNC: 218 MG/DL — HIGH (ref 70–99)

## 2024-04-29 PROCEDURE — 99239 HOSP IP/OBS DSCHRG MGMT >30: CPT

## 2024-04-29 RX ORDER — LEVETIRACETAM 250 MG/1
1 TABLET, FILM COATED ORAL
Qty: 60 | Refills: 0
Start: 2024-04-29

## 2024-04-29 RX ORDER — LEVETIRACETAM 250 MG/1
1 TABLET, FILM COATED ORAL
Qty: 0 | Refills: 0 | DISCHARGE
Start: 2024-04-29

## 2024-04-29 RX ORDER — ATORVASTATIN CALCIUM 80 MG/1
1 TABLET, FILM COATED ORAL
Qty: 30 | Refills: 0
Start: 2024-04-29

## 2024-04-29 RX ADMIN — Medication 2: at 11:51

## 2024-04-29 RX ADMIN — GABAPENTIN 100 MILLIGRAM(S): 400 CAPSULE ORAL at 05:11

## 2024-04-29 RX ADMIN — LISINOPRIL 40 MILLIGRAM(S): 2.5 TABLET ORAL at 05:11

## 2024-04-29 RX ADMIN — AMLODIPINE BESYLATE 10 MILLIGRAM(S): 2.5 TABLET ORAL at 05:12

## 2024-04-29 RX ADMIN — Medication 81 MILLIGRAM(S): at 11:51

## 2024-04-29 RX ADMIN — LEVETIRACETAM 500 MILLIGRAM(S): 250 TABLET, FILM COATED ORAL at 05:11

## 2024-04-29 NOTE — DISCHARGE NOTE PROVIDER - ATTENDING DISCHARGE PHYSICAL EXAMINATION:
PHYSICAL EXAM:  GENERAL: NAD  HEENT: Normocephalic;  conjunctivae and sclerae clear; moist mucous membranes;   NECK : supple  CHEST/LUNG: Clear to auscultation bilaterally with good air entry   HEART: S1 S2  regular; no murmurs, gallops or rubs  ABDOMEN: Soft, Nontender, Nondistended; Bowel sounds present  EXTREMITIES: no cyanosis; no edema; no calf tenderness  SKIN: warm and dry; no rash  NERVOUS SYSTEM:  Awake and alert; Oriented  to place, person and time ; no new deficits

## 2024-04-29 NOTE — DISCHARGE NOTE PROVIDER - NSDCFUSCHEDAPPT_GEN_ALL_CORE_FT
Lynn Marina  [facility]  ENDOCRIN [address]   Scheduled Appointment: 05/30/2024

## 2024-04-29 NOTE — PHYSICAL THERAPY INITIAL EVALUATION ADULT - TRANSFER SKILLS, REHAB EVAL
Problem: DISCHARGE PLANNING  Goal: Discharge to home or other facility with appropriate resources  Description: INTERVENTIONS:  - Identify barriers to discharge w/pt and caregiver  - Include patient/family/discharge partner in discharge Robert Mares Honor patient and family perspectives and choices  Outcome: Progressing     Problem: Diabetes/Glucose Control  Goal: Glucose maintained within prescribed range  Description: INTERVENTIONS:  - Monitor Blood Glucose as ordered  - Assess for signs and symptom independent

## 2024-04-29 NOTE — DISCHARGE NOTE PROVIDER - NSDCCPCAREPLAN_GEN_ALL_CORE_FT
PRINCIPAL DISCHARGE DIAGNOSIS  Diagnosis: Syncope  Assessment and Plan of Treatment:      PRINCIPAL DISCHARGE DIAGNOSIS  Diagnosis: Syncope  Assessment and Plan of Treatment: f/u dr. olsen      SECONDARY DISCHARGE DIAGNOSES  Diagnosis: Hyperlipidemia, unspecified  Assessment and Plan of Treatment:     Diagnosis: Primary hyperparathyroidism  Assessment and Plan of Treatment: f/u dr. lerner    Diagnosis: Type 2 diabetes mellitus with unspecified complications  Assessment and Plan of Treatment:     Diagnosis: History of CVA (cerebrovascular accident)  Assessment and Plan of Treatment:     Diagnosis: Benign essential HTN  Assessment and Plan of Treatment:     Diagnosis: Intracranial atherosclerosis  Assessment and Plan of Treatment: f/u with dr. pandya or dr. tam or dr. lau

## 2024-04-29 NOTE — PHYSICAL THERAPY INITIAL EVALUATION ADULT - GENERAL OBSERVATIONS, REHAB EVAL
Encountered ambulating from bathroom to bed on cardiac monitoring holding onto wall/furniture. NAD. Pt makes needs and wants known. Pt moves BUE/ BLE without difficulties and integrates both sides of her body. Pt was AA&Ox4, cooperative & followed commands. Pt has h/o R knee meniscal injury

## 2024-04-29 NOTE — DISCHARGE NOTE PROVIDER - CARE PROVIDER_API CALL
Nai Schneider  Neurology  [facility] [place] [address]  Phone: [phone]  Fax: [phone]  Follow Up Time:     Primary Care Provider,   Phone: (   )    -  Fax: (   )    -  Follow Up Time:    Nai Schneider  Neurology  [address] [place] [address]  Phone: [phone]  Fax: [phone]  Follow Up Time:     Reece Mcgee  Vascular Neurology  [address] [place] [address]  Phone: [phone]  Fax: [phone]  Follow Up Time:     Stanley Hughes  Neurosurgery  [address] [facility], [address] [address]  Phone: [phone]  Fax: [phone]  Follow Up Time:     Kyle Hou  Radiology  [address] [facility], [facility] [address]  [place] [address]  Phone: [phone]  Fax: [phone]  Follow Up Time:     Primary Care Provider,   Phone: (   )    -  Fax: (   )    -  Follow Up Time:     Joey Mac  Endocrinology/Metab/Diabetes  [address] [facility], [facility] [address]  [place] [address]  Phone: [phone]  Fax: [phone]  Follow Up Time:

## 2024-04-29 NOTE — PROGRESS NOTE ADULT - PROBLEM SELECTOR PLAN 1
Continue with the same including IV hydration and follow-up calcium levels.  Currently no more interventions like pamidronate.  Patient does not have Humoral Hypercalcemia of malignancy

## 2024-04-29 NOTE — DISCHARGE NOTE PROVIDER - PROVIDER TOKENS
PROVIDER:[TOKEN:[[id number]]],FREE:[LAST:[Primary Care Provider],PHONE:[(   )    -],FAX:[(   )    -]] PROVIDER:[TOKEN:[[id number]]],PROVIDER:[TOKEN:[[id number]]],PROVIDER:[TOKEN:[[id number]]],PROVIDER:[TOKEN:[[id number]]],FREE:[LAST:[Primary Care Provider],PHONE:[(   )    -],FAX:[(   )    -]],PROVIDER:[TOKEN:[[id number]]]

## 2024-04-29 NOTE — PROGRESS NOTE ADULT - PROVIDER SPECIALTY LIST ADULT
Internal Medicine
Neurology
Internal Medicine
Neurology
Neurology
Endocrinology

## 2024-04-29 NOTE — PROGRESS NOTE ADULT - SUBJECTIVE AND OBJECTIVE BOX
Neurology Progress Note    No acute events overnight.     Neuro Exam: AOx3. Follows commands. No dysarthria. No facial droop. PERRL. Moving all four extremities.     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 She was asked to hold off driving for a few months (i.e. 3 months)  - MRI brain reviewed with patient  - continue aspirin and statin for secondary stroke prevention  - will need outpatient Neurovascular consult for multiple intracranial atherosclerosis  - follow up in our office for management of seizure medication.

## 2024-04-29 NOTE — DISCHARGE NOTE PROVIDER - CARE PROVIDERS DIRECT ADDRESSES
,DirectAddress_Unknown,DirectAddress_Unknown ,DirectAddress_Unknown,[email or web address],DirectAddress_Unknown,DirectAddress_Unknown

## 2024-04-29 NOTE — DISCHARGE NOTE NURSING/CASE MANAGEMENT/SOCIAL WORK - PATIENT PORTAL LINK FT
You can access the FollowMyHealth Patient Portal offered by [facility] by registering at the following website: [email or web address]. By joining [employer]’s FollowMyHealth portal, you will also be able to view your health information using other applications (apps) compatible with our system.

## 2024-04-29 NOTE — PHYSICAL THERAPY INITIAL EVALUATION ADULT - ADDITIONAL COMMENTS
Pt reports she lives alone on the 1st floor of a house. Pts friend resides on 2nd level and can assist if needed per pt. There is 2 [address] HR to enter the house. Pt is right handed and still drives. Pt ambulates outside with a SAC and sometimes uses a rolling walker.

## 2024-04-29 NOTE — PHYSICAL THERAPY INITIAL EVALUATION ADULT - PERTINENT HX OF CURRENT PROBLEM, REHAB EVAL
78 years old female with h/o HTN, HLD, DM, h/o right frontal infarct in 12/2023 was brought in to ED with transient unresponsiveness, while to talking to her daughter over phone. Daughter described it as blank staring upwards, no post-ictal confusion or prodromal features.

## 2024-04-29 NOTE — PROGRESS NOTE ADULT - ASSESSMENT
78 years old female with h/o HTN, HLD, DM, h/o right frontal infarct in 12/2023 was brought in to ED with transient unresponsiveness, while to talking to her daughter over phone. Daughter described it as blank staring upwards, no post-ictal confusion or prodromal features. Admitted for Syncope evaluation     A/P:  #Syncope - etioloy could be secondary to neurovascular event- reviewed cta head /neck several multifocal stenotic intracranial vessels. will d/w neuro vascular at tertiary center for their review    check orthostatic   get  pt eval   eeg notedClinical Impression:  -Structural/functional focal cerebral dysfunction in the right frontotemporal region.  -There were no epileptiform abnormalities recorded.     get mri brain rule acute cva  4/27/2024- f/u mri brain     #H/o Rt frontal Infarct    continue with asa  #HTN   bp stable continue with meds  #DM   f/u a1c   #HLD  continue statin    thyroid cysts-and one nodule - noted on US-   will check pth as recommended by radiologist   4/27/2024 - elevated PTH - will get endocrine consult- possible parathyroid adenoma
78 years old female with h/o HTN, HLD, DM, h/o right frontal infarct in 12/2023 was brought in to ED with transient unresponsiveness, while to talking to her daughter over phone. Daughter described it as blank staring upwards, no post-ictal confusion or prodromal features. Admitted for Syncope evaluation     A/P:  #Syncope - etioloy could be secondary to neurovascular event- reviewed cta head /neck several multifocal stenotic intracranial vessels. will d/w neuro vascular at tertiary center for their review    check orthostatic   get  pt eval   eeg notedClinical Impression:  -Structural/functional focal cerebral dysfunction in the right frontotemporal region.  -There were no epileptiform abnormalities recorded.     get mri brain rule acute cva    #H/o Rt frontal Infarct    continue with asa  #HTN   bp stable continue with meds  #DM   f/u a1c   #HLD  continue statin    thyroid cysts-and one nodule - noted on US-   will check pth as recommended by radiologist 
78 years old female with h/o HTN, HLD, DM, h/o right frontal infarct in 12/2023 was brought in to ED with transient unresponsiveness, while to talking to her daughter over phone. Daughter described it as blank staring upwards, no post-ictal confusion or prodromal features. Admitted for Syncope evaluation     A/P:  #Syncope - etioloy could be secondary to neurovascular event- reviewed cta head /neck several multifocal stenotic intracranial vessels. will d/w neuro vascular at tertiary center for their review    check orthostatic   get  pt eval   eeg noted Clinical Impression:  -Structural/functional focal cerebral dysfunction in the right frontotemporal region.  -There were no epileptiform abnormalities recorded.     get mri brain rule acute cva  4/27/2024- f/u mri brain   4/28/2024- mri brain- is negative for acute cva    #H/o Rt frontal Infarct    continue with asa  #HTN   bp stable continue with meds  #DM   f/u a1c -->7.4    #HLD  continue statin    thyroid cysts-and one nodule - noted on US-   will check pth as recommended by radiologist   4/27/2024 - elevated PTH - will get endocrine consult- possible parathyroid adenoma   await consult     dispo- daughter unable to get mom on today as she is away in [place]   also await PT evalauation
78 years old female with h/o HTN, HLD, DM, h/o right frontal infarct in 12/2023 was brought in to ED with transient unresponsiveness, while to talking to her daughter over phone. Daughter described it as blank staring upwards, no post-ictal confusion or prodromal features. Admitted for Syncope evaluation     A/P:  #Syncope   #H/o Rt frontal Infarct   #HTN  #DM  #HLD    Plan:  -Patient w/ syncopal event, hx is somewhat suggestive of a seizure episode especially given hx  fronto-temporal cva. However, etiology remains unclear at this time.   -CTA head and neck- Chronic ischemic changes. Severe multifocal narrowing of the major intracranial vessels.  -Carotid US- no hemodynamic significant stenosis   -EEG w/ no epileptiform changes. Structural/functional focal cerebral dysfunction in the right frontotemporal region. Neurology evaluation pending   -Will also get ECHO to evaluate for any structural heart disease, start tele monitoring   -HbA1C 7.4%, c/w hss   -BP stable   -D/w daughter over phone at length, all questions were answered.     
Hypercalcemia

## 2024-04-29 NOTE — DISCHARGE NOTE PROVIDER - NSDCMRMEDTOKEN_GEN_ALL_CORE_FT
amLODIPine 10 mg oral tablet: 1 tab(s) orally once a day  aspirin 81 mg oral tablet, chewable: 1 tab(s) orally once a day  ferrous fumarate 325 mg (106 mg elemental iron) oral tablet: 1 tab(s) orally once a day  gabapentin 100 mg oral tablet: orally 2 times a day  Janumet 50 mg-1000 mg oral tablet: 1 tab(s) orally 2 times a day  Jardiance 10 mg oral tablet: 1 tab(s) orally once a day  Keppra 500 mg oral tablet: 1 tab(s) orally 2 times a day  Lipitor 80 mg oral tablet: 1 tab(s) orally once a day (at bedtime)  lisinopril 40 mg oral tablet: 1 tab(s) orally once a day

## 2024-04-29 NOTE — DISCHARGE NOTE PROVIDER - HOSPITAL COURSE
78 years old female with h/o HTN, HLD, DM, h/o right frontal infarct in 12/2023 was brought in to ED with transient unresponsiveness, while to talking to her daughter over phone. Daughter described it as blank staring upwards, no post-ictal confusion or prodromal features. Admitted for Syncope evaluation  cta head /neck several multifocal stenotic intracranial vessels. EEG performed, structural/functional focal cerebral dysfunction in the right frontotemporal region, there were no epileptiform abnormalities recorded.  MRI brain negative for acute CVA. Patient seen by neurology who suspects complex partial seizure. Patient also with elevated PTH, endocrine consulted, no further workup necessary.   Patient stable for discharge with home PT.

## 2024-04-29 NOTE — PROGRESS NOTE ADULT - SUBJECTIVE AND OBJECTIVE BOX
Patient is a 78y old  Female who presents with a chief complaint of AMS (29 Apr 2024 15:36)      Interval History: Calcium is normal.  PTH is elevated but patient has no signs and symptoms now of hypocalcemia.  Discharge planning is on    MEDICATIONS  (STANDING):  amLODIPine   Tablet 10 milliGRAM(s) Oral daily  aspirin  chewable 81 milliGRAM(s) Oral daily  atorvastatin 80 milliGRAM(s) Oral at bedtime  dextrose 10% Bolus 125 milliLiter(s) IV Bolus once  dextrose 5%. 1000 milliLiter(s) (100 mL/Hr) IV Continuous <Continuous>  dextrose 5%. 1000 milliLiter(s) (50 mL/Hr) IV Continuous <Continuous>  dextrose 50% Injectable 12.5 Gram(s) IV Push once  dextrose 50% Injectable 25 Gram(s) IV Push once  gabapentin 100 milliGRAM(s) Oral two times a day  glucagon  Injectable 1 milliGRAM(s) IntraMuscular once  insulin lispro (ADMELOG) corrective regimen sliding scale   SubCutaneous three times a day before meals  insulin lispro (ADMELOG) corrective regimen sliding scale   SubCutaneous at bedtime  levETIRAcetam 500 milliGRAM(s) Oral two times a day  lisinopril 40 milliGRAM(s) Oral daily    MEDICATIONS  (PRN):  acetaminophen     Tablet .. 650 milliGRAM(s) Oral every 6 hours PRN Mild Pain (1 - 3), Moderate Pain (4 - 6)  dextrose Oral Gel 15 Gram(s) Oral once PRN Blood Glucose LESS THAN 70 milliGRAM(s)/deciliter  melatonin 3 milliGRAM(s) Oral at bedtime PRN Insomnia      Allergies    Biaxin (Urticaria; Rash)  shellfish (Swelling)    Intolerances        REVIEW OF SYSTEMS:  CONSTITUTIONAL: no changes  EYES: No eye pain, visual disturbances, or discharge  ENMT:  No difficulty hearing, No sinus or throat pain  NECK: No pain or stiffness  RESPIRATORY: No cough, wheezing, chills or hemoptysis; No shortness of breath  CARDIOVASCULAR: No chest pain, palpitations or leg swelling  GASTROINTESTINAL: No abdominal or epigastric pain. No nausea, vomiting, or hematemesis; No diarrhea or constipation. No melena or hematochezia.  GENITOURINARY: No dysuria, frequency, hematuria, or incontinence  NEUROLOGICAL: No headaches, memory loss, loss of strength, numbness, or tremors  SKIN: No itching, burning, rashes, or lesions   ENDOCRINE: No heat or cold intolerance; No hair loss  MUSCULOSKELETAL: No joint pain or swelling; No muscle, back, or extremity pain  PSYCHIATRIC: No depression, anxiety, mood swings, or difficulty sleeping  HEME/LYMPH: No easy bruising, or bleeding gums  ALLERY AND IMMUNOLOGIC: No hives or eczema    Vital Signs Last 24 Hrs  T(C): 36.6 (29 Apr 2024 16:10), Max: 36.8 (29 Apr 2024 10:46)  T(F): 97.9 (29 Apr 2024 16:10), Max: 98.2 (29 Apr 2024 10:46)  HR: 79 (29 Apr 2024 16:10) (66 - 80)  BP: 131/74 (29 Apr 2024 16:10) (115/67 - 138/79)  BP(mean): --  RR: 18 (29 Apr 2024 16:10) (18 - 19)  SpO2: 97% (29 Apr 2024 16:10) (97% - 100%)    Parameters below as of 29 Apr 2024 16:10  Patient On (Oxygen Delivery Method): room air        PHYSICAL EXAM:  GENERAL:   HEAD: Atraumatic, Normocephalic  EYES: PERRLA, conjunctiva and sclera clear  ENMT: No  exudates,; Moist mucous membranes,, No lesions  NECK: Supple, No JVD, Normal thyroid  NERVOUS SYSTEM:  Alert & Oriented,   CHEST/LUNG: Clear to auscultation bilaterally; No rales, rhonchi, wheezing, or rubs  HEART: Regular rate and rhythm; No murmurs, rubs, or gallops  ABDOMEN: Soft, Nontender, Nondistended; Bowel sounds present  EXTREMITIES:  2+ Peripheral Pulses, no edema  SKIN: No rashes or lesions    LABS:        CAPILLARY BLOOD GLUCOSE      POCT Blood Glucose.: 144 mg/dL (29 Apr 2024 16:34)  POCT Blood Glucose.: 218 mg/dL (29 Apr 2024 10:58)  POCT Blood Glucose.: 147 mg/dL (29 Apr 2024 07:48)    Lipid panel:           Thyroid:  Diabetes Tests:  Parathyroid Panel:  Adrenals:  RADIOLOGY & ADDITIONAL TESTS:    Imaging Personally Reviewed:  [ ] YES  [ ] NO    Consultant(s) Notes Reviewed:  [ ] YES  [ ] NO    Care Discussed with Consultants/Other Providers [ ] YES  [ ] NO

## 2024-05-06 ENCOUNTER — NON-APPOINTMENT (OUTPATIENT)
Age: 79
End: 2024-05-06

## 2024-05-07 ENCOUNTER — APPOINTMENT (OUTPATIENT)
Dept: ENDOCRINOLOGY | Facility: CLINIC | Age: 79
End: 2024-05-07
Payer: MEDICARE

## 2024-05-07 VITALS
OXYGEN SATURATION: 95 % | HEART RATE: 82 BPM | DIASTOLIC BLOOD PRESSURE: 70 MMHG | SYSTOLIC BLOOD PRESSURE: 120 MMHG | HEIGHT: 65 IN | RESPIRATION RATE: 16 BRPM | WEIGHT: 151 LBS | BODY MASS INDEX: 25.16 KG/M2

## 2024-05-07 DIAGNOSIS — E11.65 TYPE 2 DIABETES MELLITUS WITH HYPERGLYCEMIA: ICD-10-CM

## 2024-05-07 DIAGNOSIS — I10 ESSENTIAL (PRIMARY) HYPERTENSION: ICD-10-CM

## 2024-05-07 DIAGNOSIS — E83.52 HYPERCALCEMIA: ICD-10-CM

## 2024-05-07 DIAGNOSIS — E78.5 HYPERLIPIDEMIA, UNSPECIFIED: ICD-10-CM

## 2024-05-07 DIAGNOSIS — M81.0 AGE-RELATED OSTEOPOROSIS W/OUT CURRENT PATHOLOGICAL FRACTURE: ICD-10-CM

## 2024-05-07 DIAGNOSIS — E04.2 NONTOXIC MULTINODULAR GOITER: ICD-10-CM

## 2024-05-07 LAB — GLUCOSE BLDC GLUCOMTR-MCNC: 148

## 2024-05-07 PROCEDURE — 99214 OFFICE O/P EST MOD 30 MIN: CPT

## 2024-05-07 PROCEDURE — 82962 GLUCOSE BLOOD TEST: CPT

## 2024-05-07 PROCEDURE — 95251 CONT GLUC MNTR ANALYSIS I&R: CPT

## 2024-05-07 PROCEDURE — G2211 COMPLEX E/M VISIT ADD ON: CPT

## 2024-05-07 NOTE — HISTORY OF PRESENT ILLNESS
[FreeTextEntry1] : Patient is here  for multiple medical issues  *** May 07, 2024 ***  admitted 2 weeks ago with a new seizure episode (thought was related to the prior stroke) started on Aptiom  labs from the hospital reviewed - a1c- 7.4%, TSH- 1.58, Ft4- 1.5, calcium- 11.2, PTH- 90, LDL- 101 still no repeat 24h Uca staying on Janumet 50-1000mg BID,  Jardiance 10 mg qd ,  Prandin 1 mg ac lunch only  Thyroid US (4/24/24) done at the hospital- There are multiple benign-appearing colloid cysts measuring up to 8 mm in the right lobe and scattered benign-appearing colloid cysts measuring up to 1.3 cm in the left midpole. (TIRAD -1). Please note the on the prior CT of 04/24/2024 there is 1 cm hyperdense nodule located inferior and posterior to the lower pole of the right lobe, right paraesophageal in location. This nodule was not identified on today's exam but may represent parathyroid adenoma.   wearing Alice Date of download:  05/07/2024  Diabetes Medications and Dosage: as above Indication for CGMS: verify a change in the treatment regimen, identify periods of hypoglycemia/ hyperglycemia.  Modal day report: pattern.  Pt with HYPO  0% of the time ( NONE below 54),  82% in target range Hyperglycemia:  18% elevation  Identified issues: carbohydrate counting dates analyzed:  04/24/2024- 05/07/2024  *** Jan 30, 2024 ***  admitted last month to [facility] with acute stroke labs at that time- a1c- 8.4, ca- 9.9-10.5 most recent calcium 11.2 states that collected a 24h Uca, but "it was lost" missed her sono appt  taking  Janumet 50-1000mg BID, Prandin 1 mg ac B+D (states that hs been taking am dose "as soon as she gets up, but not before breakfast"), Lisinopril 40 mg qd (dose increased), amlodipine , Crestor 20 mg HS, Fosamax 70 mg qw (started in 09/21), daily OTC vitamin D3 wearing Alice  Date of download:  01/30/2024  Diabetes Medications and Dosage: as above Indication for CGMS: verify a change in the treatment regimen, identify periods of hypoglycemia/ hyperglycemia.  Modal day report: pattern.  Pt with HYPO  5% of the time ( NONE below 54),  72% in target range Hyperglycemia:  23% elevation  Identified issues: carbohydrate counting, spiking post lunch dates analyzed:  01/17/2024- 01/30/2024  *** Sep 28, 2023 ***  taking Janumet 50-1000mg BID, Prandin 1 mg ac B+D (skips sometimes the 2nd dose), Lisinopril 20 mg qd, Fosamax 70 mg qw (started in 09/21) not taking crestor has difficulties operating/ scanning Alice 2 not scanning recently and is not checking FS today p/B- 180 (did not take Prandin) labs from 8/23- a1c- 7.2, LDL- 191, ca- 11.1  DXA(3/4/23)- L2-L3 (-2.3),  LFN (-3.3) did not collect 24h Uca and no thyroid sono yet  *** May 01, 2023 ***  last seen in 2019 reconsulted for diabetic and HPT management no recent labs. Thinks she had DXA recently, but is not aware of the results taking Metformin 1000 mg qd only, Lisinopril 20 mg qd, Fosamax 70 mg qw (started in 09/21) thinks that also takes "another diabetic medication", but not sure exactly which one. She does not know whether she's taking statins  Parathyroid scan (9/7/19)- parathyroid lesion posterior to RLP and LUP. ? possible 3rd lesion inferior to the LLP  *** July 19, 2019 ***  On metformin 1g bid states that  jardiance and onglyza are not covered.  stopped checking BS at home today ppg in the office- 143 did not do a parathyroid scan yet Thyr US (5/2/19)- b/l colloid cysts. No parathyroid adenoma DXA (5/2/19)- severe kyphosis with L1(-2.7), L2 (-3.9), FN (-2.8), radius 33% (-2.6)  ca- 11.1 <-- 11.2, PTH- 95, 25D- 23.8 24h Uca- 59 Ca/creat ratio- 0.004  HISTORY OF PRESENT ILLNESS.   Ms. BARBER was diagnosed with Diabetes Mellitus Type 2 in 2014  Denies known complications of retinopathy, nephropathy, or neuropathy.  Reports history HTN, dyslipidemia. Denies CAD.  Presently on metformin 1g qd to bid, lisinopril 20mg qd. She's been previously on Januvia, but stopped because of GI distress Blood sugars are checked once a day.   Hypoglycemia frequency: none Diet:not following ADA.  Exercise: none  Lab review: a1c- 8.9, ca- 10.7, LDL-  177, creatinine  0.82  Cancer history: none Calcium supplementation: none Fracture history: traumatic left shoulder, right arm fractures Bone disease risk factors: no history of liver disease, kidney disease, thyroid disease, anticonvulsant use, glucocorticoid use, autoimmune disorders such as rheumatoid arthritis and SLE, COPD, IBD, known malabsorption disorders, or weight loss.  Family history is negative for calcium disorders, nephrolithiasis, pancreatitis and tumors.  Currently, no symptoms of polyuria, polydipsia, constipation, abdominal pain, mental confusion, depression, bone pain or fractures.   ****  Last dilated eye - 02/23 Last podiatry visit  - 2018 Last cardiology evaluation - several years ago Last stress test - several years ago Last 2-D Echo - several years ago Last nephrology evaluation - none Last neurology evaluation- none

## 2024-05-07 NOTE — ASSESSMENT
[Diabetes Foot Care] : diabetes foot care [Long Term Vascular Complications] : long term vascular complications of diabetes [Carbohydrate Consistent Diet] : carbohydrate consistent diet [Importance of Diet and Exercise] : importance of diet and exercise to improve glycemic control, achieve weight loss and improve cardiovascular health [Exercise/Effect on Glucose] : exercise/effect on glucose [Hypoglycemia Management] : hypoglycemia management [Glucagon Use] : glucagon use [Self Monitoring of Blood Glucose] : self monitoring of blood glucose [Retinopathy Screening] : Patient was referred to ophthalmology for retinopathy screening [Diabetic Medications] : Risks and benefits of diabetic medications were discussed [FreeTextEntry1] : 1. T2DM, suboptimally controlled - very poor adherence - compliance is reiterated - Janumet 50-1000mg BID,  - Jardiance 10 mg qd (R+B). hopefully the coverage has improved - can take Prandin 1 mg ac large carb breakfast or lunch only - written instructions are given - Alice 3 - Hydration !!! - crestor 20mg HS - monitor BP, urine microalbumin  2. Hypercalcemia - repeat a 24hrs U calcium. If still with underexcretion of Ca, will send for CASR  3. Osteoporosis - DXA 3 sites in 03/25 - on Fosamax since 09/2021 - to take OTC vitamin D3 1,000 IU/day - continue weight-bearing exercises; advised avoiding high risk sports - routine dental evaluation advised. - She's not a candidate for rh-PTH therapy b/o hyperparathyroidism.  4. MNG - overall stable b/l cysts  *** Plan is discussed  with daughter Ninfa Mirza [phone]

## 2024-05-13 ENCOUNTER — NON-APPOINTMENT (OUTPATIENT)
Age: 79
End: 2024-05-13

## 2024-05-28 ENCOUNTER — RX RENEWAL (OUTPATIENT)
Age: 79
End: 2024-05-28

## 2024-05-30 RX ORDER — SITAGLIPTIN AND METFORMIN HYDROCHLORIDE 50; 1000 MG/1; MG/1
50-1000 TABLET, FILM COATED ORAL
Qty: 180 | Refills: 0 | Status: ACTIVE | COMMUNITY
Start: 2023-05-15 | End: 1900-01-01

## 2024-06-03 ENCOUNTER — APPOINTMENT (OUTPATIENT)
Dept: NEUROLOGY | Facility: CLINIC | Age: 79
End: 2024-06-03
Payer: MEDICARE

## 2024-06-03 VITALS
WEIGHT: 150 LBS | OXYGEN SATURATION: 95 % | HEART RATE: 73 BPM | HEIGHT: 65 IN | BODY MASS INDEX: 24.99 KG/M2 | SYSTOLIC BLOOD PRESSURE: 125 MMHG | DIASTOLIC BLOOD PRESSURE: 70 MMHG

## 2024-06-03 DIAGNOSIS — I67.9 CEREBROVASCULAR DISEASE, UNSPECIFIED: ICD-10-CM

## 2024-06-03 DIAGNOSIS — Z86.73 PERSONAL HISTORY OF TRANSIENT ISCHEMIC ATTACK (TIA), AND CEREBRAL INFARCTION W/OUT RESIDUAL DEFICITS: ICD-10-CM

## 2024-06-03 DIAGNOSIS — R56.9 UNSPECIFIED CONVULSIONS: ICD-10-CM

## 2024-06-03 PROCEDURE — 99205 OFFICE O/P NEW HI 60 MIN: CPT

## 2024-06-03 RX ORDER — ASPIRIN ENTERIC COATED TABLETS 81 MG 81 MG/1
81 TABLET, DELAYED RELEASE ORAL
Refills: 0 | Status: ACTIVE | COMMUNITY
Start: 2024-06-03

## 2024-06-03 NOTE — DISCUSSION/SUMMARY
[FreeTextEntry1] : Ms. Mcdonald is a 78 year-old woman with PMH HTN, HLD, right frontal stroke (12/2023), DM who presented to the office today for hospital follow-up. She is about 6 weeks post seizure vs. TIA localized to the basilar artery (ipsilateral gaze preference and weakness). 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 Unclear if event was 2/2 seizure or posterior circulation TIA related to her intracranial atherosclerotic stenosis. Will perform MRA head with NOVA to assess degree of intracranial flow disturbance from the stenoses. She will continue ASA/Crestor/HTN/DM meds for TIA/post stroke management and  mg BID for seizure prevention. She will follow-up with me after imaging in about 6-8 weeks. All of their questions and concerns were addressed.

## 2024-06-03 NOTE — PHYSICAL EXAM
[FreeTextEntry1] : GENERAL PHYSICAL EXAM: GEN: no distress, normal affect EYES: sclera white, conjunctiva clear, no nystagmus CV: normal rhythm PULM: no respiratory distress, normal rhythm and effort EXT: no edema, no cyanosis MSK: muscle tone and strength normal SKIN: warm, dry, no rash or lesion on exposed skin   NEUROLOGICAL EXAM: Mental Status Orientation: alert and oriented to person, place, time, and situation Language: clear and fluent, intact comprehension and repetition  Cranial Nerves II: visual fields full to confrontation  III, IV, VI: PERRL, EOMI V, VII: facial sensation and movement intact and symmetric  VIII: hearing intact  IX, X: uvula midline, soft palate elevates normally  XI: BL shoulder shrug intact  XII: tongue midline  Motor Shoulder abd: 5 (R), 5 (L) EF/EE: 5 (R), 5 (L) WF/WE: 5 (R), 5 (L) hand : 5 (R), 5 (L) HF/HE: 4 (R), 5 (L) KF/KE: 5 (R), 5 (L) DF/PF: 5 (R), 5 (L)  Tone and bulk are normal in upper and lower limbs No pronator drift  Sensation Intact to light touch in all 4 EXTs  Coordination Normal FTN bilaterally Able to perform rapid, alternating movements  Gait Antalgic gait

## 2024-06-03 NOTE — HISTORY OF PRESENT ILLNESS
[FreeTextEntry1] : Ms. Mcdonald is a 78 year-old woman with PMH HTN, HLD, right frontal stroke (12/2023), DM who presented to the ED at  on 4/24/24. While on Facetime with her daughter that day she became unresponsive. Daughter described it as blank staring upwards to the right, then become slumped over to the right and began making a snoring noise. When EMS arrived, she was found on the floor, foaming from her mouth. Symptoms lasted 10-15 minutes before improving. CTH with no evidence of acute intracranial hemorrhage or midline shift. CTA showed severe multifocal stenosis of the anterior and posterior circulation and a small 2-3 mm anterior communicating artery aneurysm. EEG performed with evidence of structural/functional focal cerebral dysfunction in the right frontotemporal region, there were no epileptiform abnormalities recorded. Neurology consulted, suspected complex partial seizure and initiated AED treatment. MRI brain negative for acute infarction. Since discharge, she denies interval TIA, stroke or seizure-like symptoms.

## 2024-06-17 NOTE — PATIENT PROFILE ADULT - FUNCTIONAL ASSESSMENT - DAILY ACTIVITY SCORE.
[facility]SW-N  Circumcision Procedural Note    Dallas Romero Patient Status:  [place]    2024 MRN [id number]   Location [facility]SW-N Attending Viri Couch,    Hosp Day # 1 PCP Carolina Lucas MD     Pre-procedure:  Patient consented, infant identified, genital exam normal    Preop Diagnosis:     Uncircumcised Male Infant    Postop Diagnosis:  Same as above    Procedure:  Infant Circumcision    Circumcised with:  Gomco  1.1    Surgeon:  Jina Rodriguez MD    Analgesia/Anesthetic Utilized: Lidocaine    Complications:  none    EBL:  Minimal    Condition: stable  Jina Rodriguez MD  2024  11:28 AM    
20

## 2024-06-28 ENCOUNTER — NON-APPOINTMENT (OUTPATIENT)
Age: 79
End: 2024-06-28

## 2024-08-19 ENCOUNTER — APPOINTMENT (OUTPATIENT)
Dept: ENDOCRINOLOGY | Facility: CLINIC | Age: 79
End: 2024-08-19
Payer: MEDICARE

## 2024-08-19 VITALS
RESPIRATION RATE: 16 BRPM | OXYGEN SATURATION: 96 % | HEIGHT: 65 IN | WEIGHT: 148 LBS | SYSTOLIC BLOOD PRESSURE: 129 MMHG | DIASTOLIC BLOOD PRESSURE: 77 MMHG | HEART RATE: 69 BPM | BODY MASS INDEX: 24.66 KG/M2

## 2024-08-19 DIAGNOSIS — E83.52 HYPERCALCEMIA: ICD-10-CM

## 2024-08-19 DIAGNOSIS — E11.65 TYPE 2 DIABETES MELLITUS WITH HYPERGLYCEMIA: ICD-10-CM

## 2024-08-19 DIAGNOSIS — I10 ESSENTIAL (PRIMARY) HYPERTENSION: ICD-10-CM

## 2024-08-19 DIAGNOSIS — M81.0 AGE-RELATED OSTEOPOROSIS W/OUT CURRENT PATHOLOGICAL FRACTURE: ICD-10-CM

## 2024-08-19 DIAGNOSIS — E78.5 HYPERLIPIDEMIA, UNSPECIFIED: ICD-10-CM

## 2024-08-19 DIAGNOSIS — E04.2 NONTOXIC MULTINODULAR GOITER: ICD-10-CM

## 2024-08-19 LAB — GLUCOSE BLDC GLUCOMTR-MCNC: 127

## 2024-08-19 PROCEDURE — G2211 COMPLEX E/M VISIT ADD ON: CPT

## 2024-08-19 PROCEDURE — 99214 OFFICE O/P EST MOD 30 MIN: CPT

## 2024-08-19 PROCEDURE — 95251 CONT GLUC MNTR ANALYSIS I&R: CPT

## 2024-08-19 PROCEDURE — 36415 COLL VENOUS BLD VENIPUNCTURE: CPT

## 2024-08-19 PROCEDURE — 82962 GLUCOSE BLOOD TEST: CPT

## 2024-08-19 NOTE — HISTORY OF PRESENT ILLNESS
[FreeTextEntry1] : Patient is here  for multiple medical issues  *** Aug 19, 2024 ***  feels ok, offers no new c/o staying on Janumet 50-1000mg BID,  Jardiance 10 mg qd, Crestor 20mg HS, Keppra, Lisinopril, Norvasc, ASA  Date of download:  08/19/2024  Diabetes Medications and Dosage: as above Indication for CGMS: verify a change in the treatment regimen, identify periods of hypoglycemia/ hyperglycemia.  Modal day report: pattern.  Pt with HYPO  0% of the time ( NONE below 54),  90% in target range Hyperglycemia:  10% elevation  Identified issues: carbohydrate counting dates analyzed: 08/06/2024 - 08/19/2024  24hrs Ca- 72 <-- 59  *** May 07, 2024 ***  admitted 2 weeks ago with a new seizure episode (thought was related to the prior stroke) started on Aptiom  labs from the hospital reviewed - a1c- 7.4%, TSH- 1.58, Ft4- 1.5, calcium- 11.2, PTH- 90, LDL- 101 still no repeat 24h Uca staying on Janumet 50-1000mg BID,  Jardiance 10 mg qd ,  Prandin 1 mg ac lunch only  Thyroid US (4/24/24) done at the hospital- There are multiple benign-appearing colloid cysts measuring up to 8 mm in the right lobe and scattered benign-appearing colloid cysts measuring up to 1.3 cm in the left midpole. (TIRAD -1). Please note the on the prior CT of 04/24/2024 there is 1 cm hyperdense nodule located inferior and posterior to the lower pole of the right lobe, right paraesophageal in location. This nodule was not identified on today's exam but may represent parathyroid adenoma.   wearing Alice Date of download:  05/07/2024  Diabetes Medications and Dosage: as above Indication for CGMS: verify a change in the treatment regimen, identify periods of hypoglycemia/ hyperglycemia.  Modal day report: pattern.  Pt with HYPO  0% of the time ( NONE below 54),  82% in target range Hyperglycemia:  18% elevation  Identified issues: carbohydrate counting dates analyzed:  04/24/2024- 05/07/2024  *** Jan 30, 2024 ***  admitted last month to [facility] with acute stroke labs at that time- a1c- 8.4, ca- 9.9-10.5 most recent calcium 11.2 states that collected a 24h Uca, but "it was lost" missed her sono appt  taking  Janumet 50-1000mg BID, Prandin 1 mg ac B+D (states that hs been taking am dose "as soon as she gets up, but not before breakfast"), Lisinopril 40 mg qd (dose increased), amlodipine , Crestor 20 mg HS, Fosamax 70 mg qw (started in 09/21), daily OTC vitamin D3 wearing Alice  Date of download:  01/30/2024  Diabetes Medications and Dosage: as above Indication for CGMS: verify a change in the treatment regimen, identify periods of hypoglycemia/ hyperglycemia.  Modal day report: pattern.  Pt with HYPO  5% of the time ( NONE below 54),  72% in target range Hyperglycemia:  23% elevation  Identified issues: carbohydrate counting, spiking post lunch dates analyzed:  01/17/2024- 01/30/2024  *** Sep 28, 2023 ***  taking Janumet 50-1000mg BID, Prandin 1 mg ac B+D (skips sometimes the 2nd dose), Lisinopril 20 mg qd, Fosamax 70 mg qw (started in 09/21) not taking crestor has difficulties operating/ scanning Alice 2 not scanning recently and is not checking FS today p/B- 180 (did not take Prandin) labs from 8/23- a1c- 7.2, LDL- 191, ca- 11.1  DXA(3/4/23)- L2-L3 (-2.3),  LFN (-3.3) did not collect 24h Uca and no thyroid sono yet  *** May 01, 2023 ***  last seen in 2019 reconsulted for diabetic and HPT management no recent labs. Thinks she had DXA recently, but is not aware of the results taking Metformin 1000 mg qd only, Lisinopril 20 mg qd, Fosamax 70 mg qw (started in 09/21) thinks that also takes "another diabetic medication", but not sure exactly which one. She does not know whether she's taking statins  Parathyroid scan (9/7/19)- parathyroid lesion posterior to RLP and LUP. ? possible 3rd lesion inferior to the LLP  *** July 19, 2019 ***  On metformin 1g bid states that  jardiance and onglyza are not covered.  stopped checking BS at home today ppg in the office- 143 did not do a parathyroid scan yet Thyr US (5/2/19)- b/l colloid cysts. No parathyroid adenoma DXA (5/2/19)- severe kyphosis with L1(-2.7), L2 (-3.9), FN (-2.8), radius 33% (-2.6)  ca- 11.1 <-- 11.2, PTH- 95, 25D- 23.8 24h Uca- 59 Ca/creat ratio- 0.004  HISTORY OF PRESENT ILLNESS.   Ms. BARBER was diagnosed with Diabetes Mellitus Type 2 in 2014  Denies known complications of retinopathy, nephropathy, or neuropathy.  Reports history HTN, dyslipidemia. Denies CAD.  Presently on metformin 1g qd to bid, lisinopril 20mg qd. She's been previously on Januvia, but stopped because of GI distress Blood sugars are checked once a day.   Hypoglycemia frequency: none Diet:not following ADA.  Exercise: none  Lab review: a1c- 8.9, ca- 10.7, LDL-  177, creatinine  0.82  Cancer history: none Calcium supplementation: none Fracture history: traumatic left shoulder, right arm fractures Bone disease risk factors: no history of liver disease, kidney disease, thyroid disease, anticonvulsant use, glucocorticoid use, autoimmune disorders such as rheumatoid arthritis and SLE, COPD, IBD, known malabsorption disorders, or weight loss.  Family history is negative for calcium disorders, nephrolithiasis, pancreatitis and tumors.  Currently, no symptoms of polyuria, polydipsia, constipation, abdominal pain, mental confusion, depression, bone pain or fractures.   ****  Last dilated eye - 02/23 Last podiatry visit  - 2018 Last cardiology evaluation - several years ago Last stress test - several years ago Last 2-D Echo - several years ago Last nephrology evaluation - none Last neurology evaluation- none

## 2024-08-19 NOTE — ASSESSMENT
[Diabetes Foot Care] : diabetes foot care [Long Term Vascular Complications] : long term vascular complications of diabetes [Carbohydrate Consistent Diet] : carbohydrate consistent diet [Importance of Diet and Exercise] : importance of diet and exercise to improve glycemic control, achieve weight loss and improve cardiovascular health [Exercise/Effect on Glucose] : exercise/effect on glucose [Hypoglycemia Management] : hypoglycemia management [Glucagon Use] : glucagon use [Self Monitoring of Blood Glucose] : self monitoring of blood glucose [Retinopathy Screening] : Patient was referred to ophthalmology for retinopathy screening [Diabetic Medications] : Risks and benefits of diabetic medications were discussed [FreeTextEntry1] : 1. T2DM, suboptimally controlled - very poor adherence - compliance is reiterated - Janumet  mg BID,  - Jardiance 10 mg qd (R+B). hopefully the coverage has improved - can take Prandin 1 mg ac large carb breakfast or lunch only - written instructions are given - Alice 3 - Hydration !!! - crestor 20mg HS - monitor BP, urine microalbumin  2. Hypercalcemia - repeated a 24hrs U calcium is underexcretion-  will send for CASR  3. Osteoporosis - DXA 3 sites in 03/25 - on Fosamax since 09/2021 - to take OTC vitamin D3 1,000 IU/day - continue weight-bearing exercises; advised avoiding high risk sports - routine dental evaluation advised. - She's not a candidate for rh-PTH therapy b/o hyperparathyroidism.  4. MNG - overall stable b/l cysts  *** Plan is discussed  with daughter Ninfa Mirza [phone]

## 2024-08-24 LAB
25(OH)D3 SERPL-MCNC: 24.1 NG/ML
ALBUMIN SERPL ELPH-MCNC: 4.9 G/DL
ALP BLD-CCNC: 78 U/L
ALT SERPL-CCNC: 11 U/L
ANION GAP SERPL CALC-SCNC: 11 MMOL/L
AST SERPL-CCNC: 18 U/L
BILIRUB SERPL-MCNC: 0.4 MG/DL
BUN SERPL-MCNC: 21 MG/DL
CA-I SERPL-SCNC: 5.7 MG/DL
CALCIUM SERPL-MCNC: 11.4 MG/DL
CALCIUM SERPL-MCNC: 11.4 MG/DL
CHLORIDE SERPL-SCNC: 106 MMOL/L
CHOLEST SERPL-MCNC: 166 MG/DL
CO2 SERPL-SCNC: 24 MMOL/L
CREAT SERPL-MCNC: 0.88 MG/DL
CREAT SPEC-SCNC: 200 MG/DL
EGFR: 68 ML/MIN/1.73M2
ESTIMATED AVERAGE GLUCOSE: 151 MG/DL
FOLATE SERPL-MCNC: 13.7 NG/ML
GLUCOSE SERPL-MCNC: 86 MG/DL
HBA1C MFR BLD HPLC: 6.9 %
HDLC SERPL-MCNC: 57 MG/DL
LDLC SERPL CALC-MCNC: 91 MG/DL
MICROALBUMIN 24H UR DL<=1MG/L-MCNC: 1.8 MG/DL
MICROALBUMIN/CREAT 24H UR-RTO: 9 MG/G
NONHDLC SERPL-MCNC: 109 MG/DL
PARATHYROID HORMONE INTACT: 80 PG/ML
POTASSIUM SERPL-SCNC: 4.3 MMOL/L
PROT SERPL-MCNC: 8 G/DL
SODIUM SERPL-SCNC: 141 MMOL/L
T4 FREE SERPL-MCNC: 1.3 NG/DL
TRIGL SERPL-MCNC: 102 MG/DL
TSH SERPL-ACNC: 1.65 UIU/ML
VIT B12 SERPL-MCNC: 332 PG/ML

## 2024-08-28 ENCOUNTER — APPOINTMENT (OUTPATIENT)
Dept: MRI IMAGING | Facility: HOSPITAL | Age: 79
End: 2024-08-28

## 2024-08-28 ENCOUNTER — OUTPATIENT (OUTPATIENT)
Dept: OUTPATIENT SERVICES | Facility: HOSPITAL | Age: 79
LOS: 1 days | End: 2024-08-28
Payer: MEDICARE

## 2024-08-28 DIAGNOSIS — I67.9 CEREBROVASCULAR DISEASE, UNSPECIFIED: ICD-10-CM

## 2024-08-28 PROCEDURE — 70544 MR ANGIOGRAPHY HEAD W/O DYE: CPT | Mod: 26,MH

## 2024-08-28 PROCEDURE — 70544 MR ANGIOGRAPHY HEAD W/O DYE: CPT

## 2024-09-06 NOTE — ED ADULT NURSE NOTE - BEFAST ARM NUMBNESS
[Lower back] : lower back [8] : 8 [6] : 6 [Dull/Aching] : dull/aching No [Localized] : localized [Radiating] : radiating [Tightness] : tightness [] : yes [de-identified] : 7/19/19: Ms. Ella Coon, a 82-year-old female, presents today for fu lower back - plan at last was "would reinintiaite PT at this point for tx of compression fracture and have f/u in a month or so - if not getting better then would order MRI in order to assess the healing" - overall doing about the same - going down the left leg - right leg is okay - no loss of bb control - USES CANE - at times uses gabapentin - no PT currently it was helpful for her - using brace at times  xrays today: L spine 4 views - DS at L4-5, compression deformity at L4, slight slip AT l5-s1, scoliosis in the lumbar AP pelvis -no obvious fx --- 12/4/20: Patient presents with upper and mid back pain for roughly 2 weeks. No specific injury. Pain started in the mid back, with tightness and spasm like pain, and has now extended into the lower back. Notes radicular pain in both legs. She has been taking Gabapentin with little relief. She saw Dr. Velasco last week, who prescribed MDP (mild improvement) and ordered MRIs. MRI T spine: Severe disc space narrowing and severe edematous endplate changes at T10-T11 with mild bone edema. No acute fracture. No compression deformity. Severe multilevel degenerative disc disease throughout the entire thoracic spine including disc space narrowing and vertebral endplate changes most severe at the mid thoracic spine. Mildly ectatic ascending aorta noted up to 3.7 cm. Heterogeneous left thyroid gland with suggestion of nodules and a right thyroidectomy. Consider thyroid ultrasound. Multilevel disc bulges from T1 through T12 impinging the thecal sac. No focal disc herniation. No compression of the spinal cord. MRI L spine: 1. No acute fracture. Mild chronic loss of height of the superior endplates of L3 and L4 with 20% loss of height. 2. Moderate to severe multilevel degenerative disc disease. 3. Incompletely imaged enlarged either multifibroid or adenomatous uterus. Recommend follow up pelvic ultrasound. She is the aware of the thyroid and the fibrid uterus findings  She is the aware of the thyroid and the fibrid uterus findings 1/15/21: Here for fu - plan at last was "reviewed the MRI - has what looks like mild acute compression  and T11 and also DS with stenosis L4-5 and stenosis L5-S1 - we discussed tx options - she isn't intere at this point - rec PT - shes tried pain management without relief- Tylenol #3 script" - having pain in th at this point and into the tailbone - the Tylenol #3 - the middle back not too bad - no PT so far  xrays today: T spine 2 views - no new fracture  L spine 2 views - DS at l4-5 AP pelvis - no obvious fx  6/5/21: Here for pain in the back and in the right knee - SHE FELL and hit her chin - was about 4 days  the pain in the right deep posterior buttock and down the right leg  gabapentin helps  xrays today: t spine - new new fracture  L spine - no new fracture  AP pelvis - no obvious fx r KNEE - OA IN THE KNEE 3/7/22: Here for fu - plan at last was "No obvious new fracture - gabapentin -  shower chair - Tylenol #3" - overall the hips are hurting her anteriorly - her back hurts about the same  No treatment recently  no new medication  gabapentin she takes  xrays today: L spine - no changes - has chronic loss of height and L3-4 spondylolisthesis  10/31/22: Here today with left leg paresthesias and pain for about 2 months. Was seen by PMD who referred her to vascular and being treated with laser. No known PVD. Pain in left leg constant but much worse with walking and standing and better with rest and elevation. Gabapentin qhs with help sleeping.  No similar pain in past. Back pain intermittently. No BB dysfunction, no foot drop.   Hx of comnpression fractures but this is diff pain.  also right knee instability at times.   xrays today: L spine - ds at L4-5, diffuse spondylosis, loss of disc hieght  AP PELVIS - hip narrowing  /r/ KNEE - OA IN THE KNEE   01/21/23 here for a follow up on the lower spine ,still having pain on the left leg /hip numbness.  pain is worse.  no injury.  had vascular surgery since last visit thinking this would help leg, but it did not.  aleve helps a little.  2/6/23: Here for fu - plan at last was "worsening shooting down the legs - indicated for MRi of the L spine  fu to review the MRi" - overall doing about the same - was in the office in the urgent care this weekend and was given mdp/flexeril - she didn't take the celebrex after reading about it due to fear about it - the back is doing a bit better - most of the pain in the left leg at this point   MRi L spine - stable mild chronic compression at L3, chronic mild superior endplate compression deformity at L4 - stable grade anterolisthesis at L4-5 and L5-S1 - progressive spondylosis since 2017 MRI - mild to moderate stenosis    12/15/23:  here today to follow up on her lower back. pt states symptoms increased. c/o RLE>LLE radiating pain.   1/15/24: here for fu, reports persistent low back pain, with LLE>RLE radicular pain and hamstring tightness. Patient evaluated by Dr. Ernst (pain mgtm) who recommended LESI L5-S1, which is scheduled for 1/17/24. continues to utlize cane for ambulation.  Overall pain remains   ---------------------------------------------------------------------  9/6/24 here for follow up, reports persistent low back pain, with LLE>RLE. She had LESI with Dr. Ernst in January but did not provide relief-patient did not want to do repeat injection. She would like to resume PT at this time. Continues to utilize cane for ambulation.  no recent trauma Pain with sitting   xrays today: L spine - ds at L4-5, diffuse spondylosis, loss of disc hieght  AP PELVIS - hip narrowing  (no change since the last xrays in 20220 [FreeTextEntry5] : Here for lower back follow up [FreeTextEntry7] : down the left leg

## 2024-10-21 ENCOUNTER — APPOINTMENT (OUTPATIENT)
Dept: ENDOCRINOLOGY | Facility: CLINIC | Age: 79
End: 2024-10-21

## 2025-01-07 ENCOUNTER — APPOINTMENT (OUTPATIENT)
Dept: ENDOCRINOLOGY | Facility: CLINIC | Age: 80
End: 2025-01-07
Payer: MEDICARE

## 2025-01-07 VITALS
BODY MASS INDEX: 25.49 KG/M2 | SYSTOLIC BLOOD PRESSURE: 112 MMHG | RESPIRATION RATE: 16 BRPM | HEART RATE: 97 BPM | DIASTOLIC BLOOD PRESSURE: 68 MMHG | HEIGHT: 65 IN | OXYGEN SATURATION: 96 % | TEMPERATURE: 98 F | WEIGHT: 153 LBS

## 2025-01-07 DIAGNOSIS — M81.0 AGE-RELATED OSTEOPOROSIS W/OUT CURRENT PATHOLOGICAL FRACTURE: ICD-10-CM

## 2025-01-07 DIAGNOSIS — E78.5 HYPERLIPIDEMIA, UNSPECIFIED: ICD-10-CM

## 2025-01-07 DIAGNOSIS — E11.65 TYPE 2 DIABETES MELLITUS WITH HYPERGLYCEMIA: ICD-10-CM

## 2025-01-07 DIAGNOSIS — I10 ESSENTIAL (PRIMARY) HYPERTENSION: ICD-10-CM

## 2025-01-07 DIAGNOSIS — E83.52 HYPERCALCEMIA: ICD-10-CM

## 2025-01-07 DIAGNOSIS — E04.2 NONTOXIC MULTINODULAR GOITER: ICD-10-CM

## 2025-01-07 LAB — GLUCOSE BLDC GLUCOMTR-MCNC: 181

## 2025-01-07 PROCEDURE — G2211 COMPLEX E/M VISIT ADD ON: CPT

## 2025-01-07 PROCEDURE — 95251 CONT GLUC MNTR ANALYSIS I&R: CPT

## 2025-01-07 PROCEDURE — 36415 COLL VENOUS BLD VENIPUNCTURE: CPT

## 2025-01-07 PROCEDURE — 99214 OFFICE O/P EST MOD 30 MIN: CPT

## 2025-01-07 RX ORDER — SITAGLIPTIN AND METFORMIN HYDROCHLORIDE 50; 1000 MG/1; MG/1
50-1000 TABLET, FILM COATED, EXTENDED RELEASE ORAL
Qty: 180 | Refills: 3 | Status: ACTIVE | COMMUNITY
Start: 2025-01-07 | End: 1900-01-01

## 2025-01-09 LAB
25(OH)D3 SERPL-MCNC: 25.1 NG/ML
ALBUMIN SERPL ELPH-MCNC: 4.6 G/DL
ALP BLD-CCNC: 83 U/L
ALT SERPL-CCNC: 16 U/L
ANION GAP SERPL CALC-SCNC: 15 MMOL/L
AST SERPL-CCNC: 21 U/L
BILIRUB SERPL-MCNC: 0.3 MG/DL
BUN SERPL-MCNC: 19 MG/DL
CALCIUM SERPL-MCNC: 11 MG/DL
CHLORIDE SERPL-SCNC: 104 MMOL/L
CHOLEST SERPL-MCNC: 158 MG/DL
CO2 SERPL-SCNC: 23 MMOL/L
CREAT SERPL-MCNC: 0.89 MG/DL
CREAT SPEC-SCNC: 85 MG/DL
EGFR: 66 ML/MIN/1.73M2
ESTIMATED AVERAGE GLUCOSE: 177 MG/DL
FOLATE SERPL-MCNC: 17.9 NG/ML
GLUCOSE SERPL-MCNC: 155 MG/DL
HBA1C MFR BLD HPLC: 7.8 %
HDLC SERPL-MCNC: 63 MG/DL
LDLC SERPL CALC-MCNC: 72 MG/DL
MICROALBUMIN 24H UR DL<=1MG/L-MCNC: <1.2 MG/DL
MICROALBUMIN/CREAT 24H UR-RTO: NORMAL MG/G
NONHDLC SERPL-MCNC: 95 MG/DL
POTASSIUM SERPL-SCNC: 4.9 MMOL/L
PROT SERPL-MCNC: 7.7 G/DL
SODIUM SERPL-SCNC: 142 MMOL/L
T4 FREE SERPL-MCNC: 1.2 NG/DL
TRIGL SERPL-MCNC: 136 MG/DL
TSH SERPL-ACNC: 2.28 UIU/ML
VIT B12 SERPL-MCNC: 302 PG/ML

## 2025-01-13 ENCOUNTER — NON-APPOINTMENT (OUTPATIENT)
Age: 80
End: 2025-01-13

## 2025-01-13 ENCOUNTER — EMERGENCY (EMERGENCY)
Facility: HOSPITAL | Age: 80
LOS: 0 days | Discharge: HOME | End: 2025-01-14
Attending: EMERGENCY MEDICINE
Payer: MEDICARE

## 2025-01-13 VITALS
RESPIRATION RATE: 19 BRPM | WEIGHT: 149.91 LBS | TEMPERATURE: 98 F | HEIGHT: 65 IN | DIASTOLIC BLOOD PRESSURE: 86 MMHG | SYSTOLIC BLOOD PRESSURE: 135 MMHG | OXYGEN SATURATION: 98 % | HEART RATE: 126 BPM

## 2025-01-13 DIAGNOSIS — Z86.73 PERSONAL HISTORY OF TRANSIENT ISCHEMIC ATTACK (TIA), AND CEREBRAL INFARCTION WITHOUT RESIDUAL DEFICITS: ICD-10-CM

## 2025-01-13 DIAGNOSIS — Z88.1 ALLERGY STATUS TO OTHER ANTIBIOTIC AGENTS: ICD-10-CM

## 2025-01-13 DIAGNOSIS — R11.2 NAUSEA WITH VOMITING, UNSPECIFIED: ICD-10-CM

## 2025-01-13 DIAGNOSIS — E11.9 TYPE 2 DIABETES MELLITUS WITHOUT COMPLICATIONS: ICD-10-CM

## 2025-01-13 DIAGNOSIS — R19.7 DIARRHEA, UNSPECIFIED: ICD-10-CM

## 2025-01-13 DIAGNOSIS — E78.00 PURE HYPERCHOLESTEROLEMIA, UNSPECIFIED: ICD-10-CM

## 2025-01-13 DIAGNOSIS — R00.0 TACHYCARDIA, UNSPECIFIED: ICD-10-CM

## 2025-01-13 DIAGNOSIS — R10.11 RIGHT UPPER QUADRANT PAIN: ICD-10-CM

## 2025-01-13 LAB
ALBUMIN SERPL ELPH-MCNC: 4 G/DL — SIGNIFICANT CHANGE UP (ref 3.3–5)
ALP SERPL-CCNC: 83 U/L — SIGNIFICANT CHANGE UP (ref 40–120)
ALT FLD-CCNC: 22 U/L — SIGNIFICANT CHANGE UP (ref 12–78)
ANION GAP SERPL CALC-SCNC: 10 MMOL/L — SIGNIFICANT CHANGE UP (ref 5–17)
APPEARANCE UR: CLEAR — SIGNIFICANT CHANGE UP
AST SERPL-CCNC: 22 U/L — SIGNIFICANT CHANGE UP (ref 15–37)
BASOPHILS # BLD AUTO: 0.05 K/UL — SIGNIFICANT CHANGE UP (ref 0–0.2)
BASOPHILS NFR BLD AUTO: 0.3 % — SIGNIFICANT CHANGE UP (ref 0–2)
BILIRUB SERPL-MCNC: 0.9 MG/DL — SIGNIFICANT CHANGE UP (ref 0.2–1.2)
BILIRUB UR-MCNC: NEGATIVE — SIGNIFICANT CHANGE UP
BUN SERPL-MCNC: 17 MG/DL — SIGNIFICANT CHANGE UP (ref 7–23)
CALCIUM SERPL-MCNC: 10.5 MG/DL — HIGH (ref 8.5–10.1)
CHLORIDE SERPL-SCNC: 108 MMOL/L — SIGNIFICANT CHANGE UP (ref 96–108)
CO2 SERPL-SCNC: 21 MMOL/L — LOW (ref 22–31)
COLOR SPEC: YELLOW — SIGNIFICANT CHANGE UP
CREAT SERPL-MCNC: 1.14 MG/DL — SIGNIFICANT CHANGE UP (ref 0.5–1.3)
DIFF PNL FLD: NEGATIVE — SIGNIFICANT CHANGE UP
EGFR: 49 ML/MIN/1.73M2 — LOW
EOSINOPHIL # BLD AUTO: 0.03 K/UL — SIGNIFICANT CHANGE UP (ref 0–0.5)
EOSINOPHIL NFR BLD AUTO: 0.2 % — SIGNIFICANT CHANGE UP (ref 0–6)
FLUAV AG NPH QL: SIGNIFICANT CHANGE UP
FLUBV AG NPH QL: SIGNIFICANT CHANGE UP
GLUCOSE SERPL-MCNC: 146 MG/DL — HIGH (ref 70–99)
GLUCOSE UR QL: >=1000 MG/DL
HCT VFR BLD CALC: 43.8 % — SIGNIFICANT CHANGE UP (ref 34.5–45)
HGB BLD-MCNC: 13.1 G/DL — SIGNIFICANT CHANGE UP (ref 11.5–15.5)
IMM GRANULOCYTES NFR BLD AUTO: 0.5 % — SIGNIFICANT CHANGE UP (ref 0–0.9)
KETONES UR-MCNC: 40 MG/DL
LACTATE SERPL-SCNC: 2.4 MMOL/L — HIGH (ref 0.7–2)
LEUKOCYTE ESTERASE UR-ACNC: NEGATIVE — SIGNIFICANT CHANGE UP
LIDOCAIN IGE QN: 10 U/L — LOW (ref 13–75)
LYMPHOCYTES # BLD AUTO: 1.02 K/UL — SIGNIFICANT CHANGE UP (ref 1–3.3)
LYMPHOCYTES # BLD AUTO: 6.6 % — LOW (ref 13–44)
MCHC RBC-ENTMCNC: 28.2 PG — SIGNIFICANT CHANGE UP (ref 27–34)
MCHC RBC-ENTMCNC: 29.9 G/DL — LOW (ref 32–36)
MCV RBC AUTO: 94.2 FL — SIGNIFICANT CHANGE UP (ref 80–100)
MONOCYTES # BLD AUTO: 1.04 K/UL — HIGH (ref 0–0.9)
MONOCYTES NFR BLD AUTO: 6.7 % — SIGNIFICANT CHANGE UP (ref 2–14)
NEUTROPHILS # BLD AUTO: 13.33 K/UL — HIGH (ref 1.8–7.4)
NEUTROPHILS NFR BLD AUTO: 85.7 % — HIGH (ref 43–77)
NITRITE UR-MCNC: NEGATIVE — SIGNIFICANT CHANGE UP
NRBC # BLD: 0 /100 WBCS — SIGNIFICANT CHANGE UP (ref 0–0)
PH UR: 5.5 — SIGNIFICANT CHANGE UP (ref 5–8)
PLATELET # BLD AUTO: 241 K/UL — SIGNIFICANT CHANGE UP (ref 150–400)
POTASSIUM SERPL-MCNC: 4.5 MMOL/L — SIGNIFICANT CHANGE UP (ref 3.5–5.3)
POTASSIUM SERPL-SCNC: 4.5 MMOL/L — SIGNIFICANT CHANGE UP (ref 3.5–5.3)
PROT SERPL-MCNC: 8.9 GM/DL — HIGH (ref 6–8.3)
PROT UR-MCNC: NEGATIVE MG/DL — SIGNIFICANT CHANGE UP
RBC # BLD: 4.65 M/UL — SIGNIFICANT CHANGE UP (ref 3.8–5.2)
RBC # FLD: 15.2 % — HIGH (ref 10.3–14.5)
RSV RNA NPH QL NAA+NON-PROBE: SIGNIFICANT CHANGE UP
SARS-COV-2 RNA SPEC QL NAA+PROBE: SIGNIFICANT CHANGE UP
SODIUM SERPL-SCNC: 139 MMOL/L — SIGNIFICANT CHANGE UP (ref 135–145)
SP GR SPEC: >1.03 — HIGH (ref 1–1.03)
UROBILINOGEN FLD QL: 0.2 MG/DL — SIGNIFICANT CHANGE UP (ref 0.2–1)
WBC # BLD: 15.54 K/UL — HIGH (ref 3.8–10.5)
WBC # FLD AUTO: 15.54 K/UL — HIGH (ref 3.8–10.5)

## 2025-01-13 PROCEDURE — 71046 X-RAY EXAM CHEST 2 VIEWS: CPT | Mod: 26

## 2025-01-13 PROCEDURE — 74177 CT ABD & PELVIS W/CONTRAST: CPT | Mod: 26

## 2025-01-13 PROCEDURE — 99285 EMERGENCY DEPT VISIT HI MDM: CPT | Mod: FS

## 2025-01-13 PROCEDURE — 93010 ELECTROCARDIOGRAM REPORT: CPT

## 2025-01-13 RX ORDER — ACETAMINOPHEN 80 MG/.8ML
1000 SOLUTION/ DROPS ORAL ONCE
Refills: 0 | Status: COMPLETED | OUTPATIENT
Start: 2025-01-13 | End: 2025-01-13

## 2025-01-13 RX ORDER — PIPERACILLIN AND TAZOBACTAM 3; .375 G/15ML; G/15ML
3.38 INJECTION, POWDER, LYOPHILIZED, FOR SOLUTION INTRAVENOUS ONCE
Refills: 0 | Status: COMPLETED | OUTPATIENT
Start: 2025-01-13 | End: 2025-01-13

## 2025-01-13 RX ORDER — FAMOTIDINE 20 MG/1
20 TABLET, FILM COATED ORAL ONCE
Refills: 0 | Status: COMPLETED | OUTPATIENT
Start: 2025-01-13 | End: 2025-01-13

## 2025-01-13 RX ORDER — SODIUM CHLORIDE 9 MG/ML
1000 INJECTION, SOLUTION INTRAMUSCULAR; INTRAVENOUS; SUBCUTANEOUS ONCE
Refills: 0 | Status: DISCONTINUED | OUTPATIENT
Start: 2025-01-13 | End: 2025-01-13

## 2025-01-13 RX ORDER — ONDANSETRON 4 MG/1
4 TABLET ORAL ONCE
Refills: 0 | Status: COMPLETED | OUTPATIENT
Start: 2025-01-13 | End: 2025-01-13

## 2025-01-13 RX ORDER — SODIUM CHLORIDE 9 MG/ML
1750 INJECTION, SOLUTION INTRAMUSCULAR; INTRAVENOUS; SUBCUTANEOUS ONCE
Refills: 0 | Status: COMPLETED | OUTPATIENT
Start: 2025-01-13 | End: 2025-01-13

## 2025-01-13 RX ADMIN — SODIUM CHLORIDE 1750 MILLILITER(S): 9 INJECTION, SOLUTION INTRAMUSCULAR; INTRAVENOUS; SUBCUTANEOUS at 20:17

## 2025-01-13 RX ADMIN — PIPERACILLIN AND TAZOBACTAM 200 GRAM(S): 3; .375 INJECTION, POWDER, LYOPHILIZED, FOR SOLUTION INTRAVENOUS at 22:34

## 2025-01-13 RX ADMIN — ACETAMINOPHEN 400 MILLIGRAM(S): 80 SOLUTION/ DROPS ORAL at 20:09

## 2025-01-13 RX ADMIN — FAMOTIDINE 20 MILLIGRAM(S): 20 TABLET, FILM COATED ORAL at 20:09

## 2025-01-13 RX ADMIN — ONDANSETRON 4 MILLIGRAM(S): 4 TABLET ORAL at 20:09

## 2025-01-13 NOTE — ED PROVIDER NOTE - PHYSICAL EXAMINATION
GEN: Awake, alert, interactive, NAD.  HEAD AND NECK: NC/AT. Airway patent. Neck supple.   EYES:  Clear b/l. EOMI. PERRL.   ENT: Moist mucus membranes.   CARDIAC: Regular rate, regular rhythm. No evident pedal edema.    RESP/CHEST: Normal respiratory effort with no use of accessory muscles or retractions. Clear throughout on auscultation.  ABD: soft, non-distended, (+) mild tenderness to RUQ pain. No rebound, no guarding.   BACK: No midline spinal TTP. No CVAT.   EXTREMITIES: Moving all extremities with no apparent deformities.   SKIN: Warm, dry, intact normal color. No rash.   NEURO: AOx3, CN II-XII grossly intact, no focal deficits.   PSYCH: Appropriate mood and affect.

## 2025-01-13 NOTE — ED ADULT NURSE NOTE - NSICDXPASTMEDICALHX_GEN_ALL_CORE_FT
PAST MEDICAL HISTORY:  CVA (cerebrovascular accident)     DM (diabetes mellitus)     HLD (hyperlipidemia)     Mild HTN

## 2025-01-13 NOTE — ED PROVIDER NOTE - NS ED ROS FT
CONSTITUTIONAL: No fever,  no fatigue  EYES: No visual changes  ENT: No ear pain, no sore throat  CARDIOVASCULAR: No chest pain, no palpitations  RESPIRATORY: No cough, no SOB  GI:  no constipation   GENITOURINARY: No dysuria, no frequency, no hematuria  MUSKULOSKELETAL: No backpain, no joint pain, no myalgias  SKIN: No rash  NEURO: No headache    ALL OTHER SYSTEMS NEGATIVE.

## 2025-01-13 NOTE — ED ADULT NURSE NOTE - CHIEF COMPLAINT QUOTE
pt c/o RUQ abdominal pain, nausea, vomiting, weakness and fatigue since this morning. states she thinks her symptoms are "from taking Janumet on a empty stomach". pt also  sent from urgent care for tremors and elevated HR. history of dm, stroke ( 2023) and vertigo . hr at triage 126. fs 95

## 2025-01-13 NOTE — ED PROVIDER NOTE - OBJECTIVE STATEMENT
----- Message from Jennifer Cantrell EMRE   80 y/o female with dm, history CVA, high chol here with feeling weak and lethargic today. Pt reports vomiting x 3 episodes and diarrhea. Pt also reports having right upper abdominal pain. Denies fever, chills. Pt went to  and was sent here for elevated hr and tremors. Pt denies chest pain, dyspnea, cough, recent travel, sick contact.

## 2025-01-13 NOTE — ED ADULT NURSE NOTE - OBJECTIVE STATEMENT
Pt presents to ED A&Ox4 ambulatory with cane presents to ED c/o R upper quadrant pain accompanied with  3 episodes of vomiting, generalized weakness and fatigue that started this morning. Pt was evaluated at Urgent Care and was sent to ED to be further evaluated for tremors. Pt denies urinary symptoms, fever or chills. Hx of HTN, DM and stroke (2023).

## 2025-01-13 NOTE — ED ADULT TRIAGE NOTE - CHIEF COMPLAINT QUOTE
pt c/o RUQ abdominal pain, nausea, vomiting, weakness and fatigue since this morning. states she thinks her symptoms are from taking Janumet on a empty stomach. pt sent from urgent care for tremors and elevated HR. history of dm, stroke ( 2023) and vertigo . hr at triage 126 pt c/o RUQ abdominal pain, nausea, vomiting, weakness and fatigue since this morning. states she thinks her symptoms are "from taking Janumet on a empty stomach". pt also  sent from urgent care for tremors and elevated HR. history of dm, stroke ( 2023) and vertigo . hr at triage 126. fs 95

## 2025-01-13 NOTE — ED PROVIDER NOTE - CLINICAL SUMMARY MEDICAL DECISION MAKING FREE TEXT BOX
78 y/o female with dm, history CVA, high chol here with feeling weak, lethargic, abdominal pain today. Vs reviewed, pt is tachycardic and fever 100.9   Ddx include gastroenteritis, acute katelynn, colitis, appendicitis.   Will obtain sepsis labs, ua, ivf, tylenol, pepcid, zofran, US and ct a/p ordered.

## 2025-01-14 VITALS
TEMPERATURE: 99 F | DIASTOLIC BLOOD PRESSURE: 70 MMHG | HEART RATE: 93 BPM | SYSTOLIC BLOOD PRESSURE: 103 MMHG | RESPIRATION RATE: 17 BRPM | OXYGEN SATURATION: 99 %

## 2025-01-14 PROCEDURE — 76700 US EXAM ABDOM COMPLETE: CPT | Mod: 26

## 2025-01-19 LAB
CULTURE RESULTS: SIGNIFICANT CHANGE UP
CULTURE RESULTS: SIGNIFICANT CHANGE UP
SPECIMEN SOURCE: SIGNIFICANT CHANGE UP
SPECIMEN SOURCE: SIGNIFICANT CHANGE UP

## 2025-01-24 ENCOUNTER — RX RENEWAL (OUTPATIENT)
Age: 80
End: 2025-01-24

## 2025-01-29 ENCOUNTER — INPATIENT (INPATIENT)
Facility: HOSPITAL | Age: 80
LOS: 1 days | Discharge: HOME | End: 2025-01-31
Attending: INTERNAL MEDICINE | Admitting: INTERNAL MEDICINE
Payer: MEDICARE

## 2025-01-29 VITALS
RESPIRATION RATE: 21 BRPM | OXYGEN SATURATION: 97 % | HEART RATE: 143 BPM | HEIGHT: 65 IN | SYSTOLIC BLOOD PRESSURE: 146 MMHG | WEIGHT: 156.97 LBS | DIASTOLIC BLOOD PRESSURE: 85 MMHG | TEMPERATURE: 98 F

## 2025-01-29 DIAGNOSIS — R55 SYNCOPE AND COLLAPSE: ICD-10-CM

## 2025-01-29 DIAGNOSIS — K59.00 CONSTIPATION, UNSPECIFIED: ICD-10-CM

## 2025-01-29 DIAGNOSIS — E11.9 TYPE 2 DIABETES MELLITUS WITHOUT COMPLICATIONS: ICD-10-CM

## 2025-01-29 DIAGNOSIS — I10 ESSENTIAL (PRIMARY) HYPERTENSION: ICD-10-CM

## 2025-01-29 DIAGNOSIS — E78.5 HYPERLIPIDEMIA, UNSPECIFIED: ICD-10-CM

## 2025-01-29 DIAGNOSIS — Z29.9 ENCOUNTER FOR PROPHYLACTIC MEASURES, UNSPECIFIED: ICD-10-CM

## 2025-01-29 PROBLEM — I63.9 CEREBRAL INFARCTION, UNSPECIFIED: Chronic | Status: ACTIVE | Noted: 2025-01-13

## 2025-01-29 LAB
ALBUMIN SERPL ELPH-MCNC: 4.1 G/DL — SIGNIFICANT CHANGE UP (ref 3.3–5)
ALP SERPL-CCNC: 87 U/L — SIGNIFICANT CHANGE UP (ref 40–120)
ALT FLD-CCNC: 22 U/L — SIGNIFICANT CHANGE UP (ref 12–78)
ANION GAP SERPL CALC-SCNC: 13 MMOL/L — SIGNIFICANT CHANGE UP (ref 5–17)
APPEARANCE UR: CLEAR — SIGNIFICANT CHANGE UP
APTT BLD: 27.3 SEC — SIGNIFICANT CHANGE UP (ref 24.5–35.6)
AST SERPL-CCNC: 21 U/L — SIGNIFICANT CHANGE UP (ref 15–37)
BASE EXCESS BLDV CALC-SCNC: -4.7 MMOL/L — LOW (ref -2–3)
BASOPHILS # BLD AUTO: 0.09 K/UL — SIGNIFICANT CHANGE UP (ref 0–0.2)
BASOPHILS NFR BLD AUTO: 0.7 % — SIGNIFICANT CHANGE UP (ref 0–2)
BILIRUB SERPL-MCNC: 0.8 MG/DL — SIGNIFICANT CHANGE UP (ref 0.2–1.2)
BILIRUB UR-MCNC: NEGATIVE — SIGNIFICANT CHANGE UP
BLOOD GAS COMMENTS, VENOUS: SIGNIFICANT CHANGE UP
BUN SERPL-MCNC: 15 MG/DL — SIGNIFICANT CHANGE UP (ref 7–23)
CALCIUM SERPL-MCNC: 10.9 MG/DL — HIGH (ref 8.5–10.1)
CHLORIDE SERPL-SCNC: 107 MMOL/L — SIGNIFICANT CHANGE UP (ref 96–108)
CO2 BLDV-SCNC: 22 MMOL/L — SIGNIFICANT CHANGE UP (ref 22–26)
CO2 SERPL-SCNC: 17 MMOL/L — LOW (ref 22–31)
COLOR SPEC: YELLOW — SIGNIFICANT CHANGE UP
CREAT SERPL-MCNC: 1.17 MG/DL — SIGNIFICANT CHANGE UP (ref 0.5–1.3)
DIFF PNL FLD: NEGATIVE — SIGNIFICANT CHANGE UP
EGFR: 48 ML/MIN/1.73M2 — LOW
EOSINOPHIL # BLD AUTO: 0.2 K/UL — SIGNIFICANT CHANGE UP (ref 0–0.5)
EOSINOPHIL NFR BLD AUTO: 1.5 % — SIGNIFICANT CHANGE UP (ref 0–6)
GAS PNL BLDV: SIGNIFICANT CHANGE UP
GLUCOSE BLDC GLUCOMTR-MCNC: 99 MG/DL — SIGNIFICANT CHANGE UP (ref 70–99)
GLUCOSE SERPL-MCNC: 234 MG/DL — HIGH (ref 70–99)
GLUCOSE UR QL: >=1000 MG/DL
HCO3 BLDV-SCNC: 20 MMOL/L — LOW (ref 22–28)
HCT VFR BLD CALC: 38.5 % — SIGNIFICANT CHANGE UP (ref 34.5–45)
HGB BLD-MCNC: 12.3 G/DL — SIGNIFICANT CHANGE UP (ref 11.5–15.5)
IMM GRANULOCYTES NFR BLD AUTO: 0.3 % — SIGNIFICANT CHANGE UP (ref 0–0.9)
INR BLD: 0.97 RATIO — SIGNIFICANT CHANGE UP (ref 0.85–1.16)
KETONES UR-MCNC: NEGATIVE MG/DL — SIGNIFICANT CHANGE UP
LEUKOCYTE ESTERASE UR-ACNC: NEGATIVE — SIGNIFICANT CHANGE UP
LYMPHOCYTES # BLD AUTO: 36.9 % — SIGNIFICANT CHANGE UP (ref 13–44)
LYMPHOCYTES # BLD AUTO: 4.85 K/UL — HIGH (ref 1–3.3)
MCHC RBC-ENTMCNC: 28 PG — SIGNIFICANT CHANGE UP (ref 27–34)
MCHC RBC-ENTMCNC: 31.9 G/DL — LOW (ref 32–36)
MCV RBC AUTO: 87.7 FL — SIGNIFICANT CHANGE UP (ref 80–100)
MONOCYTES # BLD AUTO: 0.75 K/UL — SIGNIFICANT CHANGE UP (ref 0–0.9)
MONOCYTES NFR BLD AUTO: 5.7 % — SIGNIFICANT CHANGE UP (ref 2–14)
NEUTROPHILS # BLD AUTO: 7.23 K/UL — SIGNIFICANT CHANGE UP (ref 1.8–7.4)
NEUTROPHILS NFR BLD AUTO: 54.9 % — SIGNIFICANT CHANGE UP (ref 43–77)
NITRITE UR-MCNC: NEGATIVE — SIGNIFICANT CHANGE UP
NRBC # BLD: 0 /100 WBCS — SIGNIFICANT CHANGE UP (ref 0–0)
NRBC BLD-RTO: 0 /100 WBCS — SIGNIFICANT CHANGE UP (ref 0–0)
PCO2 BLDV: 37 MMHG — LOW (ref 42–55)
PH BLDV: 7.35 — SIGNIFICANT CHANGE UP (ref 7.32–7.43)
PH UR: 5.5 — SIGNIFICANT CHANGE UP (ref 5–8)
PLATELET # BLD AUTO: 252 K/UL — SIGNIFICANT CHANGE UP (ref 150–400)
PO2 BLDV: 42 MMHG — SIGNIFICANT CHANGE UP (ref 25–45)
POTASSIUM SERPL-MCNC: 3.6 MMOL/L — SIGNIFICANT CHANGE UP (ref 3.5–5.3)
POTASSIUM SERPL-SCNC: 3.6 MMOL/L — SIGNIFICANT CHANGE UP (ref 3.5–5.3)
PROT SERPL-MCNC: 8.7 GM/DL — HIGH (ref 6–8.3)
PROT UR-MCNC: SIGNIFICANT CHANGE UP MG/DL
PROTHROM AB SERPL-ACNC: 11.2 SEC — SIGNIFICANT CHANGE UP (ref 9.9–13.4)
RAPID RVP RESULT: SIGNIFICANT CHANGE UP
RBC # BLD: 4.39 M/UL — SIGNIFICANT CHANGE UP (ref 3.8–5.2)
RBC # FLD: 15.9 % — HIGH (ref 10.3–14.5)
SAO2 % BLDV: 71.6 % — LOW (ref 94–98)
SARS-COV-2 RNA SPEC QL NAA+PROBE: SIGNIFICANT CHANGE UP
SODIUM SERPL-SCNC: 137 MMOL/L — SIGNIFICANT CHANGE UP (ref 135–145)
SP GR SPEC: >1.03 — HIGH (ref 1–1.03)
TROPONIN I, HIGH SENSITIVITY RESULT: 10.3 NG/L — SIGNIFICANT CHANGE UP
UROBILINOGEN FLD QL: 1 MG/DL — SIGNIFICANT CHANGE UP (ref 0.2–1)
WBC # BLD: 13.16 K/UL — HIGH (ref 3.8–10.5)
WBC # FLD AUTO: 13.16 K/UL — HIGH (ref 3.8–10.5)

## 2025-01-29 PROCEDURE — 0042T: CPT

## 2025-01-29 PROCEDURE — 99285 EMERGENCY DEPT VISIT HI MDM: CPT

## 2025-01-29 PROCEDURE — 70450 CT HEAD/BRAIN W/O DYE: CPT | Mod: 26,XU

## 2025-01-29 PROCEDURE — 70496 CT ANGIOGRAPHY HEAD: CPT | Mod: 26

## 2025-01-29 PROCEDURE — 71260 CT THORAX DX C+: CPT | Mod: 26

## 2025-01-29 PROCEDURE — 93010 ELECTROCARDIOGRAM REPORT: CPT

## 2025-01-29 PROCEDURE — 99223 1ST HOSP IP/OBS HIGH 75: CPT

## 2025-01-29 PROCEDURE — 70498 CT ANGIOGRAPHY NECK: CPT | Mod: 26

## 2025-01-29 PROCEDURE — 76705 ECHO EXAM OF ABDOMEN: CPT | Mod: 26

## 2025-01-29 PROCEDURE — 74177 CT ABD & PELVIS W/CONTRAST: CPT | Mod: 26

## 2025-01-29 RX ORDER — SODIUM CHLORIDE 9 G/ML
1000 INJECTION, SOLUTION INTRAVENOUS
Refills: 0 | Status: DISCONTINUED | OUTPATIENT
Start: 2025-01-29 | End: 2025-01-31

## 2025-01-29 RX ORDER — ATORVASTATIN CALCIUM 80 MG/1
80 TABLET, FILM COATED ORAL AT BEDTIME
Refills: 0 | Status: DISCONTINUED | OUTPATIENT
Start: 2025-01-29 | End: 2025-01-31

## 2025-01-29 RX ORDER — MAGNESIUM, ALUMINUM HYDROXIDE 200-225/5
30 SUSPENSION, ORAL (FINAL DOSE FORM) ORAL EVERY 4 HOURS
Refills: 0 | Status: DISCONTINUED | OUTPATIENT
Start: 2025-01-29 | End: 2025-01-31

## 2025-01-29 RX ORDER — EZETIMIBE 10 MG
10 TABLET ORAL DAILY
Refills: 0 | Status: DISCONTINUED | OUTPATIENT
Start: 2025-01-29 | End: 2025-01-31

## 2025-01-29 RX ORDER — LEVETIRACETAM 750 MG/1
500 TABLET, FILM COATED ORAL
Refills: 0 | Status: DISCONTINUED | OUTPATIENT
Start: 2025-01-29 | End: 2025-01-31

## 2025-01-29 RX ORDER — EZETIMIBE 10 MG
1 TABLET ORAL
Refills: 0 | DISCHARGE

## 2025-01-29 RX ORDER — SENNOSIDES 8.6 MG
2 TABLET ORAL AT BEDTIME
Refills: 0 | Status: DISCONTINUED | OUTPATIENT
Start: 2025-01-29 | End: 2025-01-31

## 2025-01-29 RX ORDER — ENOXAPARIN SODIUM 100 MG/ML
40 INJECTION SUBCUTANEOUS EVERY 24 HOURS
Refills: 0 | Status: DISCONTINUED | OUTPATIENT
Start: 2025-01-29 | End: 2025-01-31

## 2025-01-29 RX ORDER — ACETAMINOPHEN, DIPHENHYDRAMINE HCL, PHENYLEPHRINE HCL 325; 25; 5 MG/1; MG/1; MG/1
3 TABLET ORAL AT BEDTIME
Refills: 0 | Status: DISCONTINUED | OUTPATIENT
Start: 2025-01-29 | End: 2025-01-31

## 2025-01-29 RX ORDER — BISACODYL 5 MG
10 TABLET, DELAYED RELEASE (ENTERIC COATED) ORAL DAILY
Refills: 0 | Status: DISCONTINUED | OUTPATIENT
Start: 2025-01-29 | End: 2025-01-30

## 2025-01-29 RX ORDER — ASPIRIN 81 MG/1
81 TABLET, COATED ORAL DAILY
Refills: 0 | Status: DISCONTINUED | OUTPATIENT
Start: 2025-01-29 | End: 2025-01-31

## 2025-01-29 RX ORDER — GLUCAGON 3 MG/1
1 POWDER NASAL ONCE
Refills: 0 | Status: DISCONTINUED | OUTPATIENT
Start: 2025-01-29 | End: 2025-01-31

## 2025-01-29 RX ORDER — LEVETIRACETAM 750 MG/1
1000 TABLET, FILM COATED ORAL ONCE
Refills: 0 | Status: DISCONTINUED | OUTPATIENT
Start: 2025-01-29 | End: 2025-01-29

## 2025-01-29 RX ORDER — INSULIN LISPRO 100/ML
VIAL (ML) SUBCUTANEOUS EVERY 6 HOURS
Refills: 0 | Status: DISCONTINUED | OUTPATIENT
Start: 2025-01-29 | End: 2025-01-31

## 2025-01-29 RX ORDER — ACETAMINOPHEN 160 MG/5ML
650 SUSPENSION ORAL EVERY 6 HOURS
Refills: 0 | Status: DISCONTINUED | OUTPATIENT
Start: 2025-01-29 | End: 2025-01-31

## 2025-01-29 RX ORDER — POLYETHYLENE GLYCOL 3350 17 G/17G
17 POWDER, FOR SOLUTION ORAL DAILY
Refills: 0 | Status: DISCONTINUED | OUTPATIENT
Start: 2025-01-29 | End: 2025-01-30

## 2025-01-29 RX ORDER — DM/PSEUDOEPHED/ACETAMINOPHEN 10-30-250
25 CAPSULE ORAL ONCE
Refills: 0 | Status: DISCONTINUED | OUTPATIENT
Start: 2025-01-29 | End: 2025-01-31

## 2025-01-29 RX ORDER — AMLODIPINE BESYLATE 5 MG
10 TABLET ORAL DAILY
Refills: 0 | Status: DISCONTINUED | OUTPATIENT
Start: 2025-01-30 | End: 2025-01-31

## 2025-01-29 RX ADMIN — SODIUM CHLORIDE 110 MILLILITER(S): 9 INJECTION, SOLUTION INTRAVENOUS at 22:55

## 2025-01-29 RX ADMIN — LEVETIRACETAM 1000 MILLIGRAM(S): 750 TABLET, FILM COATED ORAL at 11:55

## 2025-01-29 RX ADMIN — ENOXAPARIN SODIUM 40 MILLIGRAM(S): 100 INJECTION SUBCUTANEOUS at 22:56

## 2025-01-29 RX ADMIN — ATORVASTATIN CALCIUM 80 MILLIGRAM(S): 80 TABLET, FILM COATED ORAL at 22:55

## 2025-01-29 RX ADMIN — Medication 2 TABLET(S): at 22:55

## 2025-01-29 NOTE — PATIENT PROFILE ADULT - FALL HARM RISK - HARM RISK INTERVENTIONS

## 2025-01-29 NOTE — H&P ADULT - HISTORY OF PRESENT ILLNESS
Patient is a 79F, with PMHx of DM, CVA, HLD, possible complex partial seizure(was put on Keppra 04/2024), who comes in with an episode of loss of consciousness. She is currently AAOx3. She said she lives in the same house as her friend Hero and Hero noticed patient slumped over on her seat. When she was put on the floor, she was noted to have eyes going around. Patient currently denies any symptoms and is AAOx3. When asked about Keppra, the patient said her daughter did not refill patient's Keppra so patient has been off of it for a while. Patient denies headache, fever, chills, nausea, vomit, chest pain, shortness of breath, cough, lightheadedness, abdominal pain, diarrhea, constipation, dark/bloody stool, dysuria, hematuria.  Of note, patient was suspected to have complex partial seizure in 04/2024, and was put on Keppra. Attempted to call daughter for collateral information but phone was not picked up Patient is a 79F, with PMHx of DM, CVA, HLD, possible complex partial seizure(was put on Keppra 04/2024), who comes in with an episode of loss of consciousness. She is currently AAOx3. She said she lives in the same house as her friend Hero and Hero noticed patient slumped over on her seat. When she was put on the floor, she was noted to have eyes going around. Patient currently denies any symptoms and is AAOx3. When asked about Keppra, the patient said her daughter did not refill patient's Keppra so patient has been off for few weeks. Patient denies headache, fever, chills, nausea, vomit, chest pain, shortness of breath, cough, lightheadedness, abdominal pain, diarrhea, constipation, dark/bloody stool, dysuria, hematuria.  Of note, patient was suspected to have complex partial seizure in 04/2024, and was put on Keppra. Attempted to call daughter for collateral information but phone was not picked up

## 2025-01-29 NOTE — H&P ADULT - PROBLEM SELECTOR PLAN 1
- P/w an episode of syncope where patient's eyes were rolling around  - CT head = No evidence of an acute intracranial hemorrhage, midline shift or hydrocephalus. Chronic right frontal and parietal infarcts.  - CTA head,neck = No hemodynamic significant narrowing within the neck. No proximal large vessel occlusion. Moderate intracranial atherosclerosis   - CT perfusion = Degraded by motion. Small regions of decreased/delayed perfusion right cerebral hemisphere which appear to correspond to patient's chronic infarcts.  - Possibly seizure, as patient was suspected to have complex partial seizure in 04/2024, and was put on Keppra. But patient did not refill it for a while now.  - Will give Keppra IVPB 1g STAT  - Will start Keppra 500mg BID  - Admit to telemetry  - Neuro check q4hrs  - F/u EEG  - Neuro consult in AM please - P/w an episode of syncope where patient's eyes were rolling around  - CT head = No evidence of an acute intracranial hemorrhage, midline shift or hydrocephalus. Chronic right frontal and parietal infarcts.  - CTA head,neck = No hemodynamic significant narrowing within the neck. No proximal large vessel occlusion. Moderate intracranial atherosclerosis   - CT perfusion = Degraded by motion. Small regions of decreased/delayed perfusion right cerebral hemisphere which appear to correspond to patient's chronic infarcts.  - Possibly seizure, as patient was suspected to have complex partial seizure in 04/2024, and was put on Keppra. But patient did take it for the past 2 weeks  - S/p Keppra IVPB 1g STAT  - Will start Keppra 500mg BID  - Admit to telemetry  - Neuro check q4hrs  - F/u EEG  - Neuro consult in AM please

## 2025-01-29 NOTE — ED PROVIDER NOTE - CLINICAL SUMMARY MEDICAL DECISION MAKING FREE TEXT BOX
79-year-old female pmhx of diabetes, CVA, hyperlipidemia, ?seizures (on Keppra) comes to ED w/ LOC and subsequent altered mental status. Started randomly at 1030am, patient lost consciousness, witnessed denies any tonic-clonic motions, reports eyes rolled back.  After patient woke up they were altered, nonconversational, confused.  Their pain/symptom is severe, constant, non mediating with rest. Otherwise ROS negative.    General: Altered  HEENT: NCAT, PERRL   Cardiac: RRR, no murmurs, 2+ peripheral pulses   Chest: CTAB  Abdomen: soft, non-distended, bowel sounds present, no ttp, no rebound or guarding   Extremities: no peripheral edema, calf tenderness, or leg size discrepancies   Skin: no rashes   Neuro: AAOx1, 5+motor, sensory grossly intact       Impression: 79-year-old female pmhx of diabetes, CVA, hyperlipidemia, ?seizures (on Keppra) comes to ED w/ LOC and subsequent altered mental status. Their symptoms and exam findings are concerning for syncope, TIA/stroke, metabolic abnormality, seizure, infection.    Ordered labs, imaging, medications for diagnosis, management, and treatment.

## 2025-01-29 NOTE — ED ADULT NURSE NOTE - NSFALLRISK_ED_ALL_ED
Mr. 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  Patient admits to nonadherence with salt intake or fluid intake.  Patient ran out of entesto and spirolactone on Monday but plan to  samples at his Cardiologist office [facility].  Denies chest pain, headaches, syncope or presyncope.  Patient wears Life Vest (Patient have been seen by [facility] for ICD evaluation on 4/24/19 but was sent to ED for volume overload). Last use of cocaine on Monday (4 days ago).     EKG NSR LAFB, TWI V5 V6. Troponin trend flat pattern. CXR perihilar and mild basal interstitial opacities.   
Yes

## 2025-01-29 NOTE — H&P ADULT - ASSESSMENT
Patient is a 79F, with PMHx of DM, CVA, HLD, possible complex partial seizure(was put on Keppra 04/2024), who comes in with an episode of loss of consciousness. Admitted for syncope, r/o seizure.

## 2025-01-29 NOTE — H&P ADULT - NSHPPHYSICALEXAM_GEN_ALL_CORE
Patient: Roberto Patterson Date: 2017   : 1925 Attending: Serena Madsen MD   89 year old male Allergies:   ALLERGIES:   Allergen Reactions   • Penicillins Other (See Comments)     Patient not sure          Post-op Day: 6    Operation Procedure: Cystoscopy with complex bilateral ureteral stent placement    Chief Complaint: Bilateral hydronephrosis with UTI and sepsis    Subjective/Interval History: Feeling well this morning. No complaints. Urine is clearing up.    Vital Last Value 24 Hour Range   Temperature 98 °F (36.7 °C) (17) Temp  Min: 97.8 °F (36.6 °C)  Max: 98 °F (36.7 °C)   Pulse 65 (17) Pulse  Min: 58  Max: 65   Respiratory 16 (17) Resp  Min: 16  Max: 16   Non-Invasive  Blood Pressure 124/58 (17) BP  Min: 100/62  Max: 124/58   Arterial   Blood Pressure   No Data Recorded   Pulse Oximetry 98 % (17) SpO2  Min: 98 %  Max: 99 %     Vital Today Admitted   Weight 57.1 kg (17 0441) Weight: 55.2 kg (17 1454)   Height N/A Height: 5' 11\" (180.3 cm) (17 1454)   BMI N/A BMI (Calculated): 17.01 (17 1454)     Intake/Output:    No intake/output data recorded.    I/O last 3 completed shifts:  In:  [P.O.:190; I.V.:1812]  Out: -       Intake/Output Summary (Last 24 hours) at 17 1015  Last data filed at 17 0412   Gross per 24 hour   Intake             1912 ml   Output                0 ml   Net             1912 ml       Medications/Infusions:  Scheduled:   • fluconazole  200 mg Oral Daily   • ergocalciferol  50,000 Units Oral Once per day on Tue   • midodrine       • tamsulosin  0.4 mg Oral Daily PC   • carvedilol  12.5 mg Oral 2 times per day   • sodium chloride (PF)  2 mL Injection 2 times per day   • heparin (porcine)  5,000 Units Subcutaneous 3 times per day       Continuous Infusions:   • sodium chloride 0.45 % infusion 100 mL/hr at 17 0412       Physical Exam:     Head:  Normocephalic, without obvious  abnormality, atraumatic   Lungs:   Respirations unlabored   Abdomen:   Soft, non-tender, no masses, no organomegaly   Urine: Clear yellow with scant sediment CBI slow         Laboratory Results:  Lab Results   Component Value Date    WBC 7.2 07/12/2017    HCT 28.5 (L) 07/12/2017    HGB 9.0 (L) 07/12/2017     (L) 07/12/2017    INR 1.2 07/06/2017    PTT 25 06/19/2014    BUN 82 (H) 07/12/2017    CREATININE 1.93 (H) 07/12/2017    SODIUM 147 (H) 07/12/2017    POTASSIUM 3.8 07/12/2017    CHLORIDE 116 (H) 07/12/2017    AST 7 07/06/2017    ALKPT 48 07/06/2017    BILIRUBIN 0.2 07/06/2017    CO2 25 07/12/2017    GPT 15 07/06/2017    GLUCOSE 101 (H) 07/12/2017    MG 1.6 (L) 07/12/2017    PHOS 3.8 07/10/2017    CALCIUM 7.3 (L) 07/12/2017       Urinalysis    Lab Results   Component Value Date    USPG 1.025 07/06/2017    UWBC (A) 07/06/2017     Unable to report due to abnormal urine color or presence of interfering substance.    LEUK >100 (H) 07/06/2017    ERYT >100 (H) 07/06/2017    URBC MODERATE (A) 07/06/2017    UBILI NEGATIVE 07/06/2017    UPH 5.0 07/06/2017    UBACTR NONE SEEN 07/06/2017     CULTURE (no units)   Date Value   07/06/2017 >100,000 CFU/mL CANDIDA ALBICANS (P)   07/06/2017 >100,000 CFU/mL CANDIDA ALBICANS (P)   07/06/2017 >100,000 CFU/mL CANDIDA ALBICANS (P)   07/06/2017 NO GROWTH 5 DAYS.   07/06/2017 NO GROWTH 5 DAYS.         Surgical Care Improvement Project:    [place]: Yes, [place] to remain in place due to: Ongoing recovery for surgery on genitourinary tract or continuous structures (i.e. pelvic surgery, genitourinary stents, TUR-P)  DVT/VTE Prophylaxis:   VTE Pharmacologic Prophylaxis: Yes  VTE Mechanical Prophylaxis: Yes  Antibiotics D/C'd within 24 hrs of surgery end: No,  Antibiotic ordered because patient has an infection or suspected infection    Central Line: No    Diagnosis/Comorbidities/Complications:  · Bilateral hydronephrosis s/p bilateral ureteral stent placement 7/6/17  · UTI -  candida  · Ureteral stone  · Urosepsis  · BPH c elevated PSA  · HTN/PAD    Plans/Recommendations:  · Future stone treatment, cystoscopy, and B RPGs  · Continue CBI to help flush catheter (increased sediment noted today)  · Monitor labs- Creatinine continues to trend down  · ID following   · Continue catheter until creatinine comes down to baseline (around 1.8).       Thank you for this consult. Please do not hesitate to contact us with any questions or concerns.     Fozia Youssef NP    [facility]  Office: [phone]  Pager: [phone]    Please page urology PA/NP with questions on weekdays between the hours of 9:00 am and 5:00 pm. Please call our office who will have the on call urologist paged with questions on nights and weekends.         GENERAL: NAD  HEAD: Atraumatic, Normocephalic  EYES: EOMI. Conjunctiva and sclera clear  NECK: Supple  CHEST/LUNG: Clear to auscultation bilaterally; No wheeze, rhonchi, rales  HEART: Regular rate and rhythm; No murmurs  ABDOMEN: Soft, Nontender, Nondistended; Bowel sounds present  EXTREMITIES: No edema  NEURO: AAOx3, non-focal

## 2025-01-29 NOTE — ED ADULT NURSE NOTE - NSFALLHARMRISKINTERV_ED_ALL_ED

## 2025-01-29 NOTE — ED ADULT NURSE NOTE - CHIEF COMPLAINT QUOTE
Patient BIBA presents with altered mental status following a witnessed seated syncopal episode at home. Patient appears confused only able to tell us name and date of birth. No observed focal deficits.   hx CVA, HTN, DM

## 2025-01-29 NOTE — H&P ADULT - PROBLEM SELECTOR PLAN 2
- CT C/A/P = No CT evidence of acute intrathoracic or acute intra-abdominal pathology. Constipation. Distended fluid-filled stomach proximal to nonspecific moderate wall thickening of the distal gastric body/antrum. Gastroenterology consultation is recommended.  - RUQ US = No cholelithiasis or gallbladder wall thickening.  - Patient denies being constipated currently  - GI consult in AM please for distended stomach - CT C/A/P = No CT evidence of acute intrathoracic or acute intra-abdominal pathology. Constipation. Distended fluid-filled stomach proximal to nonspecific moderate wall thickening of the distal gastric body/antrum. Gastroenterology consultation is recommended.  - RUQ US = No cholelithiasis or gallbladder wall thickening.  - Patient denies being constipated currently  - Laxatives and suppository  - NPO except meds for tonight  - GI consult in AM please for distended stomach

## 2025-01-29 NOTE — ED ADULT NURSE NOTE - OBJECTIVE STATEMENT
Pt presents to ed via ems for eval s/p losing consciousness per witnesses at 1030. Pt hx of CVA, on ASA, keppra. Witnesses denied tonic clonic movements. After stated LOC - pt was confused and nonverbal. on arrival to ED pt is alert, able to state "Inna" and her  to this writer, able to follow simple commands. Stroke note in effect. to CT.

## 2025-01-29 NOTE — PATIENT PROFILE ADULT - FUNCTIONAL ASSESSMENT - BASIC MOBILITY 6.
3-calculated by average/Not able to assess (calculate score using [facility] averaging method)

## 2025-01-29 NOTE — H&P ADULT - PROBLEM SELECTOR PROBLEM 2
9/15/2022         RE: Nancy Rodríguez  [address]  [address]  [place] [address]        Dear Colleague,    Thank you for referring your patient, Nancy Rodríguez, to the [facility]. Please see a copy of my visit note below.    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  CALR L367fs positive.  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 Rodríguez is a 63 year old female who presents with thrombocytosis, anemia, left shifted WBC and blasts on smear.    I initially saw her on 9/28/2021.  Please see previous note for details.  I have copied and updated from prior note.    Looking back she has had thrombocytosis and anemia for a number of years.  On 8/20/2021, peripheral blood smear showed left shifted WBC with blast-like cells in addition to anemia and thrombocytosis.      She feels pain in the pelvic area and back pain. Sometimes feels leg are swollen. Right ankle is more swollen. It is not more swollen. This is going on for 4 months. She denies B symptoms. No abnormal bleeding, erythromelalgia, dyspnea. Denies significant dyspnea. Feels fatigued. No nausea, vomiting, diarrhea or constipation. Last mammogram was few years ago.      I did further work-up including bone marrow biopsy.    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  CALR L367fs positive.  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  LDH is 463.      She started taking Hydrea 500 mg daily in mid December 2021.       Interval history.  She is here with her daughter.  She helps with interpretation.  Patient does not want a [place]  today.  She is doing very well.  She  denies any complaints and tolerating Hydrea 500 mg daily and aspirin daily well.  No nausea vomiting diarrhea constipation.  Energy is good.  Denies pain.  No infections.  No shortness of breath.    ROS:  Rest of the comprehensive review of the system was unremarkable.    I reviewed other history in epic as below.      Past Medical/Surgical History:  Past Medical History:   Diagnosis Date     Diabetes (H)      Headache      Nonsenile cataract      Past Surgical History:   Procedure Laterality Date     ANKLE SURGERY      left     Allergies:  Allergies as of 09/15/2022     (No Known Allergies)     Current Medications:  Current Outpatient Medications   Medication Sig Dispense Refill     ACCU-CHEK GUIDE test strip        aspirin (ASA) 81 MG EC tablet Take 1 tablet (81 mg) by mouth daily 90 tablet 3     atorvastatin (LIPITOR) 80 MG tablet        blood glucose (ACCU-CHEK FASTCLIX) lancing device 1 kit by Other route       blood glucose (NO BRAND SPECIFIED) test strip USE AS DIRECTED TO TEST BLOOD SUGAR TWICE DAILY       Calcium Carb-Cholecalciferol (CALCIUM 600 + D PO) Take 1 tablet by mouth daily       capsaicin (ZOSTRIX) 0.025 % external cream capsaicin 0.025 % topical cream   BALDEMAR 1 APPLICATION AA TID PRN P       cholecalciferol (VITAMIN D3) 125 mcg (5000 units) capsule TAKE 1 CAPSULE BY MOUTH ONCE DAILY       cyclobenzaprine (FLEXERIL) 10 MG tablet cyclobenzaprine 10 mg tablet   TK 1 T PO TID PRF MUSCLE SPASM       DOCUSATE SODIUM PO Take 100 mg by mouth daily       hydroxyurea (HYDREA) 500 MG capsule Take 1 capsule (500 mg) by mouth daily 90 capsule 3     ibuprofen (ADVIL/MOTRIN) 600 MG tablet        methocarbamol (ROBAXIN) 500 MG tablet TAKE 1 TABLET BY MOUTH TWICE DAILY AS NEEDED       omeprazole (PRILOSEC) 10 MG DR capsule        polyethylene glycol (MIRALAX) 17 GM/Dose powder Take 17 g by mouth daily as needed for constipation 510 g 1     propranolol ER (INDERAL LA) 160 MG 24 hr capsule        QUETIAPINE FUMARATE  PO Take 50 mg by mouth       SUMAtriptan (IMITREX) 50 MG tablet Take 50 mg by mouth       UNKNOWN TO PATIENT Pt states that she takes many medications, but is uncertain what they are for and what dosages they are.       venlafaxine (EFFEXOR-XR) 75 MG 24 hr capsule venlafaxine ER 75 mg capsule,extended release 24 hr   TAKE ONE CAPSULE BY MOUTH ONCE DAILY        Family History:  Family History   Problem Relation Age of Onset     Glaucoma No family hx of      Macular Degeneration No family hx of      No family history of bleeding or clotting disorder or cancers.    Social History:  Social History     Socioeconomic History     Marital status: Single     Spouse name: Not on file     Number of children: Not on file     Years of education: Not on file     Highest education level: Not on file   Occupational History     Not on file   Tobacco Use     Smoking status: Never Smoker     Smokeless tobacco: Never Used   Substance and Sexual Activity     Alcohol use: No     Alcohol/week: 0.0 standard drinks     Drug use: No     Sexual activity: Not on file   Other Topics Concern     Not on file   Social History Narrative     Not on file     Social Determinants of Health     Financial Resource Strain: Not on file   Food Insecurity: Not on file   Transportation Needs: Not on file   Physical Activity: Not on file   Stress: Not on file   Social Connections: Not on file   Intimate Partner Violence: Not At Risk     Fear of Current or Ex-Partner: No     Emotionally Abused: No     Physically Abused: No     Sexually Abused: No   Housing Stability: Not on file     Physical Exam:  BP (!) 146/66   Pulse 80   Temp 98.2  F (36.8  C)   Resp 16   Wt 88.5 kg (195 lb 1.6 oz)   SpO2 100%   BMI 30.11 kg/m       Wt Readings from Last 4 Encounters:   09/15/22 88.5 kg (195 lb 1.6 oz)   05/12/22 85.5 kg (188 lb 9.6 oz)   02/10/22 87.1 kg (192 lb 1.6 oz)   12/29/21 85.6 kg (188 lb 12.8 oz)       CONSTITUTIONAL: no acute distress  EYES: PERRLA, no  palor or icterus.   ENT/MOUTH: no mouth lesions. Ears normal  CVS: s1s2 no m r g .   RESPIRATORY: clear to auscultation b/l  GI: soft non tender no hepatosplenomegaly  NEURO: AAOX3  Grossly non focal neuro exam  INTEGUMENT: no obvious rashes  LYMPHATIC: no palpable cervical, supraclavicular, axillary or inguinal LAD  MUSCULOSKELETAL: Unremarkable. No bony tenderness.   EXTREMITIES: no edema  PSYCH: Mentation, mood and affect are normal. Decision making capacity is intact          Laboratory/Imaging Studies      Reviewed    9/15/2022.    CBC showed WBC 6.4.  Hemoglobin 10.5.  Platelets 598.   Chemistries is unremarkable.  LDH is pending.    In July 2022, LDH was 465 and chemistry was unremarkable.          2/9/2022  LDH was 451.  Iron studies were unremarkable.  B12 was normal.  Erythropoietin 82.1.    Ultrasound of the abdomen on 2/9/2022 shows mildly enlarged spleen at 14.3 cm.    11/12/2021   bone marrow biopsy shows myeloproliferative neoplasm.  There is normocellular marrow with trilineage hematopoiesis and increased and atypical megakaryocytes and 1% blasts.  Mild to moderate increase in marrow reticulin fibrosis (MF 1-2 of 3) with features suggestive of osteosclerosis.  This is most likely essential thrombocytosis with early myelofibrosis.  A distinction between primary myelofibrosis and eating with early myelofibrosis is difficult.    Concurrent flow cytometry ([id number]) shows polytypic B cells, a small unusual T cell population similar to that detected in a recent peripheral blood flow cytometry study, no increase in myeloid blasts, and no abnormal myeloid blast population. There is no definitive morphologic or immunohistochemical correlate to the unusual T cells detected by flow cytometry, which may be present in the marrow or may reflect peripheral blood contamination.     Cytogenetics is 46XX.      Flow cytometry showed rare circulating CD34 positive myeloid blasts (1.3%), unusual CD8-positive  CD57-positive T cells (1.6%) was present, and the B cells were polytypic.  The findings are not defintivie for a T cell lymphoproliferative disorder or a high grade myeloid neoplasm.    NGS panel shows CALR L367fs.  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 )        CT chest abdomen and pelvis on 10/22/2021 does not show any acute findings.  It showed borderline splenomegaly and nonspecific heterogenous sclerotic  appearance of the vertebrae and pelvic bones.    Bilateral lower extremity ultrasound on 10/22/2021 does not show evidence of DVT.          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  CALR L367fs positive.  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  LDH is 463.    She has been on Hydrea 500 mg since mid December 2021.  She is also taking aspirin.     She is doing very well and currently denies any complaints.  Labs are stable.  Hemoglobin is a little better.  Platelets are is stable.  Continue the same management.  She will take Hydrea 500 mg daily and aspirin daily.  We will check labs every 2 months.    Previously she was also evaluated by  from bone marrow transplant department whom she saw on 12/29/2021.  He also agrees with current management of aspirin and hydroxyurea.      We did not address the following today    Pelvic/back pain and extreme fatigue- overall better. CT CAP was essentially unremarkable except borderline splenomegaly     Leg swelling- R>L. US did not show any DVT- swelling has resolved.       Labs every 2 months and see me back in 6 months.    All questions answered.  She is agreeable and comfortable with the plan.  Eddie Duong MD      Again, thank you for allowing me to participate in the care of your patient.        Sincerely,        Eddie  BALBIR Duong MD     Constipation

## 2025-01-30 LAB
ALBUMIN SERPL ELPH-MCNC: 3.6 G/DL — SIGNIFICANT CHANGE UP (ref 3.3–5)
ALP SERPL-CCNC: 78 U/L — SIGNIFICANT CHANGE UP (ref 40–120)
ALT FLD-CCNC: 18 U/L — SIGNIFICANT CHANGE UP (ref 12–78)
ANION GAP SERPL CALC-SCNC: 9 MMOL/L — SIGNIFICANT CHANGE UP (ref 5–17)
AST SERPL-CCNC: 20 U/L — SIGNIFICANT CHANGE UP (ref 15–37)
BILIRUB SERPL-MCNC: 1 MG/DL — SIGNIFICANT CHANGE UP (ref 0.2–1.2)
BUN SERPL-MCNC: 10 MG/DL — SIGNIFICANT CHANGE UP (ref 7–23)
CALCIUM SERPL-MCNC: 10.7 MG/DL — HIGH (ref 8.5–10.1)
CHLORIDE SERPL-SCNC: 109 MMOL/L — HIGH (ref 96–108)
CO2 SERPL-SCNC: 21 MMOL/L — LOW (ref 22–31)
CREAT SERPL-MCNC: 0.8 MG/DL — SIGNIFICANT CHANGE UP (ref 0.5–1.3)
EGFR: 75 ML/MIN/1.73M2 — SIGNIFICANT CHANGE UP
GLUCOSE BLDC GLUCOMTR-MCNC: 123 MG/DL — HIGH (ref 70–99)
GLUCOSE BLDC GLUCOMTR-MCNC: 128 MG/DL — HIGH (ref 70–99)
GLUCOSE BLDC GLUCOMTR-MCNC: 232 MG/DL — HIGH (ref 70–99)
GLUCOSE BLDC GLUCOMTR-MCNC: 83 MG/DL — SIGNIFICANT CHANGE UP (ref 70–99)
GLUCOSE SERPL-MCNC: 108 MG/DL — HIGH (ref 70–99)
HCT VFR BLD CALC: 35.4 % — SIGNIFICANT CHANGE UP (ref 34.5–45)
HGB BLD-MCNC: 11.5 G/DL — SIGNIFICANT CHANGE UP (ref 11.5–15.5)
MCHC RBC-ENTMCNC: 28.3 PG — SIGNIFICANT CHANGE UP (ref 27–34)
MCHC RBC-ENTMCNC: 32.5 G/DL — SIGNIFICANT CHANGE UP (ref 32–36)
MCV RBC AUTO: 87.2 FL — SIGNIFICANT CHANGE UP (ref 80–100)
NRBC # BLD: 0 /100 WBCS — SIGNIFICANT CHANGE UP (ref 0–0)
NRBC BLD-RTO: 0 /100 WBCS — SIGNIFICANT CHANGE UP (ref 0–0)
PLATELET # BLD AUTO: 244 K/UL — SIGNIFICANT CHANGE UP (ref 150–400)
POTASSIUM SERPL-MCNC: 3.6 MMOL/L — SIGNIFICANT CHANGE UP (ref 3.5–5.3)
POTASSIUM SERPL-SCNC: 3.6 MMOL/L — SIGNIFICANT CHANGE UP (ref 3.5–5.3)
PROT SERPL-MCNC: 7.5 GM/DL — SIGNIFICANT CHANGE UP (ref 6–8.3)
RBC # BLD: 4.06 M/UL — SIGNIFICANT CHANGE UP (ref 3.8–5.2)
RBC # FLD: 16.1 % — HIGH (ref 10.3–14.5)
SODIUM SERPL-SCNC: 139 MMOL/L — SIGNIFICANT CHANGE UP (ref 135–145)
WBC # BLD: 10.36 K/UL — SIGNIFICANT CHANGE UP (ref 3.8–10.5)
WBC # FLD AUTO: 10.36 K/UL — SIGNIFICANT CHANGE UP (ref 3.8–10.5)

## 2025-01-30 PROCEDURE — 95816 EEG AWAKE AND DROWSY: CPT | Mod: 26

## 2025-01-30 PROCEDURE — 99232 SBSQ HOSP IP/OBS MODERATE 35: CPT

## 2025-01-30 RX ADMIN — ENOXAPARIN SODIUM 40 MILLIGRAM(S): 100 INJECTION SUBCUTANEOUS at 21:57

## 2025-01-30 RX ADMIN — Medication 10 MILLIGRAM(S): at 12:40

## 2025-01-30 RX ADMIN — Medication 1 ENEMA: at 05:39

## 2025-01-30 RX ADMIN — LEVETIRACETAM 500 MILLIGRAM(S): 750 TABLET, FILM COATED ORAL at 05:33

## 2025-01-30 RX ADMIN — ASPIRIN 81 MILLIGRAM(S): 81 TABLET, COATED ORAL at 12:39

## 2025-01-30 RX ADMIN — Medication 10 MILLIGRAM(S): at 05:33

## 2025-01-30 RX ADMIN — LEVETIRACETAM 500 MILLIGRAM(S): 750 TABLET, FILM COATED ORAL at 17:17

## 2025-01-30 RX ADMIN — Medication 2 TABLET(S): at 21:57

## 2025-01-30 RX ADMIN — Medication 40 MILLIGRAM(S): at 05:33

## 2025-01-30 RX ADMIN — ATORVASTATIN CALCIUM 80 MILLIGRAM(S): 80 TABLET, FILM COATED ORAL at 21:57

## 2025-01-30 NOTE — CONSULT NOTE ADULT - SUBJECTIVE AND OBJECTIVE BOX
Neurology Consult    Reason for Consult: Patient is a 79y old  Female who presents with a chief complaint of Syncope (30 Jan 2025 12:54)      HPI:  Patient is a 79F, with PMHx of DM, CVA, HLD, possible complex partial seizure(was put on Keppra 04/2024), who comes in with an episode of loss of consciousness. She is currently AAOx3. She said she lives in the same house as her friend Hero and Hero noticed patient slumped over on her seat. When she was put on the floor, she was noted to have eyes going around. Patient currently denies any symptoms and is AAOx3. When asked about Keppra, the patient said her daughter did not refill patient's Keppra so patient has been off for few weeks. Patient denies headache, fever, chills, nausea, vomit, chest pain, shortness of breath, cough, lightheadedness, abdominal pain, diarrhea, constipation, dark/bloody stool, dysuria, hematuria.  Of note, patient was suspected to have complex partial seizure in 04/2024, and was put on Keppra. Attempted to call daughter for collateral information but phone was not picked up (29 Jan 2025 17:18)       PAST MEDICAL & SURGICAL HISTORY:  DM (diabetes mellitus)      Mild HTN      HLD (hyperlipidemia)      CVA (cerebrovascular accident)          Allergies: Allergies    shellfish (Swelling)  Biaxin (Urticaria; Rash)    Intolerances        Social History: Denies toxic habits including tobacco, ETOH or illicit drugs.    Family History: FAMILY HISTORY:  . No family history of strokes    Medications: MEDICATIONS  (STANDING):  amLODIPine   Tablet 10 milliGRAM(s) Oral daily  aspirin  chewable 81 milliGRAM(s) Oral daily  atorvastatin 80 milliGRAM(s) Oral at bedtime  dextrose 5%. 1000 milliLiter(s) (100 mL/Hr) IV Continuous <Continuous>  dextrose 50% Injectable 25 Gram(s) IV Push once  enoxaparin Injectable 40 milliGRAM(s) SubCutaneous every 24 hours  ezetimibe 10 milliGRAM(s) Oral daily  glucagon  Injectable 1 milliGRAM(s) IntraMuscular once  insulin lispro (ADMELOG) corrective regimen sliding scale   SubCutaneous every 6 hours  lactated ringers. 1000 milliLiter(s) (110 mL/Hr) IV Continuous <Continuous>  levETIRAcetam 500 milliGRAM(s) Oral two times a day  lisinopril 40 milliGRAM(s) Oral daily  senna 2 Tablet(s) Oral at bedtime    MEDICATIONS  (PRN):  acetaminophen     Tablet .. 650 milliGRAM(s) Oral every 6 hours PRN Temp greater or equal to 38C (100.4F), Mild Pain (1 - 3)  aluminum hydroxide/magnesium hydroxide/simethicone Suspension 30 milliLiter(s) Oral every 4 hours PRN Dyspepsia  melatonin 3 milliGRAM(s) Oral at bedtime PRN Insomnia      Review of Systems:  CONSTITUTIONAL:  No weight loss, fever, chills, weakness or fatigue.  HEENT:  Eyes:  No visual loss, blurred vision, double vision or yellow sclera. Ears, Nose, Throat:  No hearing loss, sneezing, congestion, runny nose or sore throat.  SKIN:  No rash or itching.  CARDIOVASCULAR:  No chest pain, chest pressure or chest discomfort. No palpitations or edema.  RESPIRATORY:  No shortness of breath, cough or sputum.  GASTROINTESTINAL:  No anorexia, nausea, vomiting or diarrhea. No abdominal pain or blood.  GENITOURINARY:  No burning on urination or incontinence   NEUROLOGICAL:  No headache, dizziness, syncope, paralysis, ataxia, numbness or tingling in the extremities. No change in bowel or bladder control. no limb weakness. no vision changes.   MUSCULOSKELETAL:  No muscle, back pain, joint pain or stiffness.  HEMATOLOGIC:  No anemia, bleeding or bruising.  LYMPHATICS:  No enlarged nodes. No history of splenectomy.  PSYCHIATRIC:  No history of depression or anxiety.  ENDOCRINOLOGIC:  No reports of sweating, cold or heat intolerance. No polyuria or polydipsia.      Vitals:  Vital Signs Last 24 Hrs  T(C): 36.9 (30 Jan 2025 16:00), Max: 37 (29 Jan 2025 22:13)  T(F): 98.4 (30 Jan 2025 16:00), Max: 98.6 (29 Jan 2025 22:13)  HR: 89 (30 Jan 2025 16:00) (76 - 94)  BP: 113/67 (30 Jan 2025 16:00) (110/62 - 136/74)  BP(mean): --  RR: 17 (30 Jan 2025 16:00) (17 - 18)  SpO2: 98% (30 Jan 2025 16:00) (95% - 98%)    Parameters below as of 30 Jan 2025 16:00  Patient On (Oxygen Delivery Method): room air        General Exam:   General Appearance: Appropriately dressed and in no acute distress       Head: Normocephalic, atraumatic and no dysmorphic features  Ear, Nose, and Throat: Moist mucous membranes  CVS: S1S2+  Resp: No SOB, no wheeze or rhonchi  GI: soft NT/ND  Extremities: No edema or cyanosis  Skin: No bruises or rashes     Neurological Exam:  Mental Status: Awake, alert and oriented x 3.  Able to follow simple commands. Able to name and repeat. fluent speech. No obvious aphasia or dysarthria noted. Psychomotor slowing   Cranial Nerves: PERRL, EOMI, VFFC, sensation V1-V3 intact,  no obvious facial asymmetry, equal elevation of palate, scm/trap 5/5, tongue is midline on protrusion.. hearing is grossly intact.   Motor: Normal bulk, tone and strength throughout. Fine finger movements were intact and symmetric. no tremors or drift noted.    Sensation: Intact to light touch and pinprick throughout. no right/left confusion. no extinction to tactile on DSS.  Reflexes: 1+ throughout at biceps, brachioradialis, triceps, patellars and ankles bilaterally and equal. No clonus. R toe and L toe were both downgoing.  Coordination: No dysmetria on FNF   Gait: def    Data/Labs/Imaging which I personally reviewed.     Labs:     CBC Full  -  ( 30 Jan 2025 06:26 )  WBC Count : 10.36 K/uL  RBC Count : 4.06 M/uL  Hemoglobin : 11.5 g/dL  Hematocrit : 35.4 %  Platelet Count - Automated : 244 K/uL  Mean Cell Volume : 87.2 fl  Mean Cell Hemoglobin : 28.3 pg  Mean Cell Hemoglobin Concentration : 32.5 g/dL  Auto Neutrophil # : x  Auto Lymphocyte # : x  Auto Monocyte # : x  Auto Eosinophil # : x  Auto Basophil # : x  Auto Neutrophil % : x  Auto Lymphocyte % : x  Auto Monocyte % : x  Auto Eosinophil % : x  Auto Basophil % : x    01-30    139  |  109[H]  |  10  ----------------------------<  108[H]  3.6   |  21[L]  |  0.80    Ca    10.7[H]      30 Jan 2025 06:26    TPro  7.5  /  Alb  3.6  /  TBili  1.0  /  DBili  x   /  AST  20  /  ALT  18  /  AlkPhos  78  01-30    LIVER FUNCTIONS - ( 30 Jan 2025 06:26 )  Alb: 3.6 g/dL / Pro: 7.5 gm/dL / ALK PHOS: 78 U/L / ALT: 18 U/L / AST: 20 U/L / GGT: x           PT/INR - ( 29 Jan 2025 11:22 )   PT: 11.2 sec;   INR: 0.97 ratio         PTT - ( 29 Jan 2025 11:22 )  PTT:27.3 sec  Urinalysis Basic - ( 30 Jan 2025 06:26 )    Color: x / Appearance: x / SG: x / pH: x  Gluc: 108 mg/dL / Ketone: x  / Bili: x / Urobili: x   Blood: x / Protein: x / Nitrite: x   Leuk Esterase: x / RBC: x / WBC x   Sq Epi: x / Non Sq Epi: x / Bacteria: x          < from: CT Angio Neck Stroke Protocol w/ IV Cont (01.29.25 @ 11:35) >    ACC: [id number] EXAM:  CT ANGIO BRAIN STROKE PROTC IC   ORDERED BY:   TETE MAC     ACC: [id number] EXAM:  CT ANGIO NECK STROKE PROTCL IC   ORDERED BY:   TETE MAC     ACC: [id number] EXAM:  CT BRAIN STROKE PROTOCOL   ORDERED BY: TETE MAC     ACC: [id number] EXAM:  CT BRAIN PERFUSION MAPS STROKE   ORDERED BY:   TETE MAC     PROCEDURE DATE:  01/29/2025          INTERPRETATION:  CT HEAD STROKE PROTOCOL, CT HEAD PERFUSION WITH MAPS   STROKE PROTOCOL, CT ANGIO NECK STROKE PROTOCOL, CT ANGIO HEAD STROKE   PROTOCOL    CLINICAL HISTORY: Stroke Code. Syncope. Altered mental status    CT HEAD TECHNIQUE:  Noncontrast CT.  Axial acquisition.  Sagittal and coronal reformations.    COMPARISON:  Exams are compared to prior studies of 4/24/2024    FINDINGS:  HEMORRHAGE:  No evidence of intracranial hemorrhage.  BRAIN PARENCHYMA:  Chronic small to moderate right frontal and small   right parietal infarcts. Mild atrophy. Mild patchy periventricular and   subcortical white matter ischemic changes.  No mass or mass effect.  VENTRICLES / SHIFT:  No hydrocephalus. No midline shift.  EXTRA-AXIAL / BASAL CISTERNS:  No extra-axial mass. Basal cisterns   preserved.  CALVARIUM AND EXTRACRANIAL SOFT TISSUES:  No depressed calvarial fracture.  SINUSES, ORBITS, MASTOIDS:  The visualized paranasal sinuses and mastoid   air cells are well aerated.  ----------------------------------------    CTA TECHNIQUE:  CT angiography of the neck and brain was performed during the dynamic   administration of intravenous contrast.  MIP reconstructions were   performed and reviewed. Multiplanar reformations were obtained.  Contrast administered: 80 cc Omipaque 350.  Contrast discarded: 10 cc.    CTA FINDINGS:  NECK  RIGHT CAROTID CIRCULATION:  Evaluation of the right carotid circulation demonstrates no hemodynamic   significant narrowing utilizing a distal reference. Tortuosity of the   internal carotid artery.  LEFT CAROTID CIRCULATION:  Evaluation of the left carotid circulationdemonstrates no hemodynamic   significant narrowing utilizing a distal reference. Tortuosity internal   carotid artery.  VERTEBRAL ARTERIES:  Evaluation of the vertebral arteries reveals no evidence of a vertebral   artery occlusion or dissection.    BRAIN  ANTERIOR CIRCULATION:  Distal internal carotid arteries are patent.  Atherosclerotic   calcification of the cavernous and supraclinoid segments with moderate   degrees of narrowing  Anterior cerebral arteries are patent.  There are mild multifocal regions   of narrowing left greater than right anterior cerebral arteries.  Middle cerebral arteries are patent.  There are mild multifocal regions   of narrowing bilaterally  POSTERIOR CIRCULATION:  Distal vertebral arteries are patent. Moderateregions of narrowing   bilateral vertebral arteries. A left posterior inferior cerebellar artery   is seen  Basilar artery is patent.  Mild regions of narrowing involve the basilar   artery. Proximal superior cerebellar arteries appear patent  Posterior cerebral arteries are patent.  Mild to moderate multifocal   regions of narrowing bilaterally    OTHER:  Gross patency of the large dural sinuses.  --------------------------------------------------    CT PERFUSION TECHNIQUE:  CT perfusion was performed during the administration of intravenous   contrast.  There was a total of 8 - 10 cm brain coverage.  The images were processed utilizing RAPID software.  Contrast administered: 40 cc Omipaque 350.  Contrast discarded: 5 cc.    Ischemic tissue is defined as TMAX > 6 seconds.  Core infarct is defined as CBF < 30%.    CT PERFUSION FINDINGS:  Exam is degraded by motion.  There are small regions of decreased/delayed perfusion right cerebral   hemispheres which appear to correspond to patient's chronic infarcts.  The volume of ischemic tissue (core infarct and penumbra) measures 13 mL.  The core infarct measures 5 mL.  The mismatch volume (penumbra) measure 8 mL.  The mismatch ratio (ischemic tissue / core) is: 2.6  The hypoperfusion index (Tmax>10s/Tmax>6s) is: 0.0      IMPRESSION:  HEAD CT: No evidence of an acute intracranial hemorhage, midline shift or   hydrocephalus. Chronic right frontal and parietal infarcts.  NECK CTA: No hemodynamic significant narowing within the neck.  BRAIN CTA: No proximal large vessel occlusion. Moderate intracranial   atherosclerosis as described.  CT PERFUSION: Degraded by motion. Small regions of decreased/delayed   perfusion right cerebral hemisphere which appear to correspond to   patient's chronic infarcts.    Interval follow-up if acute ischemia remains of clinical concern.    Preliminary head CT findings discussed with Dr MAC  at 11:33 AM on   1/29/2025    --- End of Report ---            MARGI PARKS MD  This document has been electronically signed. Jan 29 2025 11:54AM    < end of copied text >  EEG Classification / Summary:  Abnormal EEG in the awake, drowsy, and asleep states.   -Abundant right hemispheric focal slowing  -No epileptiform abnormalities or seizures captured.     Clinical Impression:  -Right hemispheric focal cerebral dysfunction can be structural or functional in etiology.   -No epileptiform abnormalities or seizures.

## 2025-01-30 NOTE — PROGRESS NOTE ADULT - SUBJECTIVE AND OBJECTIVE BOX
Patient is a 79y old  Female who presents with a chief complaint of Syncope (29 Jan 2025 17:18)      INTERVAL HPI/OVERNIGHT EVENTS:  Pt was seen and examined, no acute events.      MEDICATIONS  (STANDING):  amLODIPine   Tablet 10 milliGRAM(s) Oral daily  aspirin  chewable 81 milliGRAM(s) Oral daily  atorvastatin 80 milliGRAM(s) Oral at bedtime  dextrose 5%. 1000 milliLiter(s) (100 mL/Hr) IV Continuous <Continuous>  dextrose 50% Injectable 25 Gram(s) IV Push once  enoxaparin Injectable 40 milliGRAM(s) SubCutaneous every 24 hours  ezetimibe 10 milliGRAM(s) Oral daily  glucagon  Injectable 1 milliGRAM(s) IntraMuscular once  insulin lispro (ADMELOG) corrective regimen sliding scale   SubCutaneous every 6 hours  lactated ringers. 1000 milliLiter(s) (110 mL/Hr) IV Continuous <Continuous>  levETIRAcetam 500 milliGRAM(s) Oral two times a day  lisinopril 40 milliGRAM(s) Oral daily  senna 2 Tablet(s) Oral at bedtime    MEDICATIONS  (PRN):  acetaminophen     Tablet .. 650 milliGRAM(s) Oral every 6 hours PRN Temp greater or equal to 38C (100.4F), Mild Pain (1 - 3)  aluminum hydroxide/magnesium hydroxide/simethicone Suspension 30 milliLiter(s) Oral every 4 hours PRN Dyspepsia  melatonin 3 milliGRAM(s) Oral at bedtime PRN Insomnia      Allergies  shellfish (Swelling)  Biaxin (Urticaria; Rash)        Vital Signs Last 24 Hrs  T(C): 36.7 (30 Jan 2025 12:12), Max: 37 (29 Jan 2025 22:13)  T(F): 98 (30 Jan 2025 12:12), Max: 98.6 (29 Jan 2025 22:13)  HR: 90 (30 Jan 2025 12:12) (76 - 110)  BP: 110/88 (30 Jan 2025 12:12) (110/62 - 136/74)  BP(mean): --  RR: 18 (30 Jan 2025 12:12) (14 - 19)  SpO2: 97% (30 Jan 2025 12:12) (95% - 99%)    Parameters below as of 30 Jan 2025 12:12  Patient On (Oxygen Delivery Method): room air        PHYSICAL EXAM:  GENERAL: NAD  HEAD:  Atraumatic  EYES: PERRLA  ENMT: Mouth moist   NECK: Supple  NERVOUS SYSTEM:  Awake, alert  CHEST/LUNG: Clear  HEART: RRR, S1, S2  ABDOMEN: Soft, non tender  EXTREMITIES: no edema BL LE   SKIN: No rash         LABS:                        11.5   10.36 )-----------( 244      ( 30 Jan 2025 06:26 )             35.4     01-30    139  |  109[H]  |  10  ----------------------------<  108[H]  3.6   |  21[L]  |  0.80    Ca    10.7[H]      30 Jan 2025 06:26    TPro  7.5  /  Alb  3.6  /  TBili  1.0  /  DBili  x   /  AST  20  /  ALT  18  /  AlkPhos  78  01-30    PT/INR - ( 29 Jan 2025 11:22 )   PT: 11.2 sec;   INR: 0.97 ratio         PTT - ( 29 Jan 2025 11:22 )  PTT:27.3 sec  Urinalysis Basic - ( 30 Jan 2025 06:26 )    Color: x / Appearance: x / SG: x / pH: x  Gluc: 108 mg/dL / Ketone: x  / Bili: x / Urobili: x   Blood: x / Protein: x / Nitrite: x   Leuk Esterase: x / RBC: x / WBC x   Sq Epi: x / Non Sq Epi: x / Bacteria: x      CAPILLARY BLOOD GLUCOSE      POCT Blood Glucose.: 83 mg/dL (30 Jan 2025 12:42)  POCT Blood Glucose.: 123 mg/dL (30 Jan 2025 05:57)  POCT Blood Glucose.: 99 mg/dL (29 Jan 2025 22:22)      Urinalysis with Rflx Culture (collected 29 Jan 2025 16:30)      RADIOLOGY & ADDITIONAL TESTS:    Imaging Personally Reviewed:  [ ] YES  [ ] NO    Consultant(s) Notes Reviewed:  [ ] YES  [ ] NO    Care Discussed with Consultants/Other Providers [ ] YES  [ ] NO

## 2025-01-30 NOTE — PROGRESS NOTE ADULT - PROBLEM SELECTOR PLAN 1
- P/w an episode of syncope where patient's eyes were rolling around  - CT head = No evidence of an acute intracranial hemorrhage, midline shift or hydrocephalus. Chronic right frontal and parietal infarcts.  - CTA head, neck = No hemodynamic significant narrowing within the neck. No proximal large vessel occlusion. Moderate intracranial atherosclerosis   - CT perfusion = Degraded by motion. Small regions of decreased/delayed perfusion right cerebral hemisphere which appear to correspond to patient's chronic infarcts.  - Possibly seizure, as patient was suspected to have complex partial seizure in 04/2024, and was put on Keppra. But patient did take it for the past 2 weeks  - S/p Keppra IVPB 1g STAT  - Started Keppra 500mg BID  - F/u EEG  - Neuro consulted

## 2025-01-30 NOTE — PROGRESS NOTE ADULT - PROBLEM SELECTOR PLAN 2
- CT C/A/P = No CT evidence of acute intrathoracic or acute intra-abdominal pathology. Constipation. Distended fluid-filled stomach proximal to nonspecific moderate wall thickening of the distal gastric body/antrum. Gastroenterology consultation is recommended.  - RUQ US = No cholelithiasis or gallbladder wall thickening.  - Patient denies being constipated currently  - Laxatives and suppository  - Restarted diet   Outpt EGD with GI

## 2025-01-30 NOTE — CONSULT NOTE ADULT - ASSESSMENT
79F, with PMHx of DM, CVA, HLD, possible complex partial seizure(was put on Keppra 04/2024), who comes in with an episode of loss of consciousness concerning for seizure. Pt did not take Keppra for several days as she ran out  CTH with chronic R frontal and parietal infarcts  CTA with multifocal intracranial athero, mild to mod  EEG with R frontal slowing    Chronic strokes - embolic appearing  Seizure disorder     Resume home Keppra 500mg BID  EEG reviewed as above  aspirin and statin for secondary stroke prvention  check a1c, ldl  needs outpatient stroke wokrup if not yet done, long term cardiac monitoring   pt/ot  dvt ppx

## 2025-01-30 NOTE — EEG REPORT - NS EEG TEXT BOX
SYEDA BARBER [id number]     Study Date: 01-30-25  Duration: 24 min  --------------------------------------------------------------------------------------------------  History:  CC/ HPI Patient is a 79y old  Female who presents with a chief complaint of Syncope (30 Jan 2025 12:54)    MEDICATIONS  (STANDING):  amLODIPine   Tablet 10 milliGRAM(s) Oral daily  aspirin  chewable 81 milliGRAM(s) Oral daily  atorvastatin 80 milliGRAM(s) Oral at bedtime  dextrose 5%. 1000 milliLiter(s) (100 mL/Hr) IV Continuous <Continuous>  dextrose 50% Injectable 25 Gram(s) IV Push once  enoxaparin Injectable 40 milliGRAM(s) SubCutaneous every 24 hours  ezetimibe 10 milliGRAM(s) Oral daily  glucagon  Injectable 1 milliGRAM(s) IntraMuscular once  insulin lispro (ADMELOG) corrective regimen sliding scale   SubCutaneous every 6 hours  lactated ringers. 1000 milliLiter(s) (110 mL/Hr) IV Continuous <Continuous>  levETIRAcetam 500 milliGRAM(s) Oral two times a day  lisinopril 40 milliGRAM(s) Oral daily  senna 2 Tablet(s) Oral at bedtime    --------------------------------------------------------------------------------------------------  Study Interpretation:    [[[Abbreviation Key:  PDR=alpha rhythm/posterior dominant rhythm. A-P=anterior posterior.  Amplitude: ‘very low’:<20; ‘low’:20-49; ‘medium’:; ‘high’:>150uV.  Persistence for periodic/rhythmic patterns (% of epoch) ‘rare’:<1%; ‘occasional’:1-10%; ‘frequent’:10-50%; ‘abundant’:50-90%; ‘continuous’:>90%.  Persistence for sporadic discharges: ‘rare’:<1/hr; ‘occasional’:1/min-1/hr; ‘frequent’:>1/min; ‘abundant’:>1/10 sec.  RPP=rhythmic and periodic patterns; GRDA=generalized rhythmic delta activity; FIRDA=frontal intermittent GRDA; LRDA=lateralized rhythmic delta activity; TIRDA=temporal intermittent rhythmic delta activity;  LPD=PLED=lateralized periodic discharges; GPD=generalized periodic discharges; BIPDs =bilateral independent periodic discharges; Mf=multifocal; SIRPDs=stimulus induced rhythmic, periodic, or ictal appearing discharges; BIRDs=brief potentially ictal rhythmic discharges >4 Hz, lasting .5-10s; PFA (paroxysmal bursts >13 Hz or =8 Hz <10s).  Modifiers: +F=with fast component; +S=with spike component; +R=with rhythmic component.  S-B=burst suppression pattern.  Max=maximal. N1-drowsy; N2-stage II sleep; N3-slow wave sleep. SSS/BETS=small sharp spikes/benign epileptiform transients of sleep. HV=hyperventilation; PS=photic stimulation]]]    Daily EEG Visual Analysis    FINDINGS:      Background:  Continuity: Continuous  Symmetry: Symmetric  Posterior dominant rhythm (PDR): 9.5 Hz, reactive to eye closure. Symmetric low-amplitude frontal beta in wakefulness.  Voltage: Normal  Anterior-Posterior Gradient: Present  Other background findings: None  Breach: Absent    Background Slowing:  Generalized slowing: None  Focal slowing: Abundant right hemispheric focal polymorphic delta slowing    State Changes:   Drowsiness is characterized by slowing of the background activity.  Stage 2 sleep is characterized by symmetric K complexes.    Interictal Findings:  None    Electrographic and Electroclinical seizures:  None    Other Clinical Events:  None    Activation Procedures:   Hyperventilation is not performed.    Photic stimulation is performed and does not elicit any abnormalities.      Artifacts:  Intermittent myogenic and movement artifacts are present.    Single-lead EKG: Regular rhythm    EEG Classification / Summary:  Abnormal EEG in the awake, drowsy, and asleep states.   -Abundant right hemispheric focal slowing  -No epileptiform abnormalities or seizures captured.     Clinical Impression:  -Right hemispheric focal cerebral dysfunction can be structural or functional in etiology.   -No epileptiform abnormalities or seizures.          -------------------------------------------------------------------------------------------------------    Diane Green MD  Attending Physician, [facility] [place]    -------------------------------------------------------------------------------------------------------    To reach EEG technologist:  Please use the pager number for the appropriate hospital or contact the .  At [facility] - Pager #: [phone]    To reach EEG-reading physician:  EEG Reading Room Phone #: [phone]  Epilepsy Answering Service after 5PM and before 8:30AM: Phone #: [phone]

## 2025-01-31 ENCOUNTER — TRANSCRIPTION ENCOUNTER (OUTPATIENT)
Age: 80
End: 2025-01-31

## 2025-01-31 VITALS
SYSTOLIC BLOOD PRESSURE: 120 MMHG | TEMPERATURE: 98 F | HEART RATE: 61 BPM | RESPIRATION RATE: 17 BRPM | OXYGEN SATURATION: 95 % | DIASTOLIC BLOOD PRESSURE: 71 MMHG

## 2025-01-31 LAB
GLUCOSE BLDC GLUCOMTR-MCNC: 109 MG/DL — HIGH (ref 70–99)
GLUCOSE BLDC GLUCOMTR-MCNC: 140 MG/DL — HIGH (ref 70–99)
GLUCOSE BLDC GLUCOMTR-MCNC: 151 MG/DL — HIGH (ref 70–99)
GLUCOSE BLDC GLUCOMTR-MCNC: 209 MG/DL — HIGH (ref 70–99)

## 2025-01-31 PROCEDURE — 99239 HOSP IP/OBS DSCHRG MGMT >30: CPT

## 2025-01-31 RX ORDER — SENNOSIDES 8.6 MG
2 TABLET ORAL
Qty: 60 | Refills: 0
Start: 2025-01-31 | End: 2025-03-01

## 2025-01-31 RX ORDER — LEVETIRACETAM 750 MG/1
1 TABLET, FILM COATED ORAL
Qty: 78 | Refills: 0
Start: 2025-01-31 | End: 2025-03-10

## 2025-01-31 RX ADMIN — LEVETIRACETAM 500 MILLIGRAM(S): 750 TABLET, FILM COATED ORAL at 05:03

## 2025-01-31 RX ADMIN — Medication 2: at 11:25

## 2025-01-31 RX ADMIN — ASPIRIN 81 MILLIGRAM(S): 81 TABLET, COATED ORAL at 11:24

## 2025-01-31 RX ADMIN — Medication 10 MILLIGRAM(S): at 05:03

## 2025-01-31 RX ADMIN — Medication 1: at 00:17

## 2025-01-31 RX ADMIN — Medication 10 MILLIGRAM(S): at 11:25

## 2025-01-31 RX ADMIN — Medication 40 MILLIGRAM(S): at 05:03

## 2025-01-31 NOTE — DISCHARGE NOTE PROVIDER - HOSPITAL COURSE
Patient is a 79F, with PMHx of DM, CVA, HLD, possible complex partial seizure(was put on Keppra 04/2024), who comes in with an episode of loss of consciousness. Admitted for syncope, r/o seizure.       Problem/Plan - 1:  ·  Problem: Syncope vs seizure  ·  Plan: - P/w an episode of syncope where patient's eyes were rolling around  - CT head = No evidence of an acute intracranial hemorrhage, midline shift or hydrocephalus. Chronic right frontal and parietal infarcts.  - CTA head, neck = No hemodynamic significant narrowing within the neck. No proximal large vessel occlusion. Moderate intracranial atherosclerosis   - CT perfusion = Degraded by motion. Small regions of decreased/delayed perfusion right cerebral hemisphere which appear to correspond to patient's chronic infarcts.  - Likely seizure, as patient was suspected to have complex partial seizure in 04/2024, and was put on Keppra. But patient did take it for the past 2 weeks  - S/p Keppra IVPB 1g STAT  - Started Keppra 500mg BID  - EEG: -Right hemispheric focal cerebral dysfunction can be structural or functional in etiology. No epileptiform abnormalities or seizures.  - Neuro consult appreciated  Chronic infarct: ASA. statin, need outpt card follow up to r/o embolic etiology     Problem/Plan - 2:  ·  Problem: Constipation.   ·  Plan: - CT C/A/P = No CT evidence of acute intrathoracic or acute intra-abdominal pathology. Constipation. Distended fluid-filled stomach proximal to nonspecific moderate wall thickening of the distal gastric body/antrum. Gastroenterology consultation is recommended.  - RUQ US = No cholelithiasis or gallbladder wall thickening.  - Patient denies being constipated currently  - Laxatives and suppository  - Restarted diet, had BM   -Outpt EGD with GI.     Problem/Plan - 3:  ·  Problem: HTN (hypertension).   ·  Plan: - C/w home amlodipine 10mg, lisinopril 40mg.     Problem/Plan - 4:  ·  Problem: DM (diabetes mellitus).   ·  Plan: -Resume home med     Problem/Plan - 5:  ·  Problem: HLD (hyperlipidemia).   ·  Plan: - C/w Atorvastatin 80mg, Ezetimibe 10mg.     Problem/Plan - 6:  ·  Problem: Prophylactic measure.   ·  Plan: - Lovenox for DVT ppx    Outpt follow up with thyroid nodule, daughter is aware.

## 2025-01-31 NOTE — DISCHARGE NOTE PROVIDER - PROVIDER TOKENS
FREE:[LAST:[pcp],PHONE:[(   )    -],FAX:[(   )    -]],PROVIDER:[TOKEN:[[id number]]],PROVIDER:[TOKEN:[[id number]]]

## 2025-01-31 NOTE — DISCHARGE NOTE NURSING/CASE MANAGEMENT/SOCIAL WORK - FINANCIAL ASSISTANCE
[facility] provides services at a reduced cost to those who are determined to be eligible through [facility]’s financial assistance program. Information regarding [facility]’s financial assistance program can be found by going to [email or web address] or by calling [phone].

## 2025-01-31 NOTE — DISCHARGE NOTE PROVIDER - ATTENDING DISCHARGE PHYSICAL EXAMINATION:
Vital Signs Last 24 Hrs  T(C): 36.5 (31 Jan 2025 10:13), Max: 36.9 (30 Jan 2025 16:00)  T(F): 97.7 (31 Jan 2025 10:13), Max: 98.4 (30 Jan 2025 16:00)  HR: 86 (31 Jan 2025 10:13) (80 - 89)  BP: 122/77 (31 Jan 2025 10:13) (113/67 - 149/82)  BP(mean): --  RR: 17 (31 Jan 2025 10:13) (17 - 17)  SpO2: 95% (31 Jan 2025 10:13) (95% - 98%)    Parameters below as of 30 Jan 2025 16:00  Patient On (Oxygen Delivery Method): room air    GENERAL: NAD  HEAD:  Atraumatic  EYES: PERRLA  ENMT: Mouth moist   NECK: Supple  NERVOUS SYSTEM:  Awake, alert  CHEST/LUNG: Clear  HEART: RRR, S1, S2  ABDOMEN: Soft, non tender  EXTREMITIES: no edema BL LE   SKIN: No rash

## 2025-01-31 NOTE — DISCHARGE NOTE PROVIDER - CARE PROVIDER_API CALL
pcp,   Phone: (   )    -  Fax: (   )    -  Follow Up Time:     Maulik Douglas  Interventional Cardiology  [address] [place] [address]  Phone: [phone]  Fax: [phone]  Follow Up Time:     Yana Mccarthy  Gastroenterology  [address] [place] [address]  Phone: [phone]  Fax: [phone]  Follow Up Time:

## 2025-01-31 NOTE — DISCHARGE NOTE PROVIDER - NSDCCPCAREPLAN_GEN_ALL_CORE_FT
PRINCIPAL DISCHARGE DIAGNOSIS  Diagnosis: AMS (altered mental status)  Assessment and Plan of Treatment: Problem/Plan - 1:  ·  Problem: Syncope vs seizure  ·  Plan: - P/w an episode of syncope where patient's eyes were rolling around  - CT head = No evidence of an acute intracranial hemorrhage, midline shift or hydrocephalus. Chronic right frontal and parietal infarcts.  - CTA head, neck = No hemodynamic significant narrowing within the neck. No proximal large vessel occlusion. Moderate intracranial atherosclerosis   - CT perfusion = Degraded by motion. Small regions of decreased/delayed perfusion right cerebral hemisphere which appear to correspond to patient's chronic infarcts.  - Likely seizure, as patient was suspected to have complex partial seizure in 04/2024, and was put on Keppra. But patient did take it for the past 2 weeks  - S/p Keppra IVPB 1g STAT  - Started Keppra 500mg BID  - EEG: -Right hemispheric focal cerebral dysfunction can be structural or functional in etiology. No epileptiform abnormalities or seizures.  - Neuro consult appreciated  Chronic infarct: ASA. statin, need outpt card follow up to r/o embolic etiology   Problem/Plan - 2:  ·  Problem: Constipation.   ·  Plan: - CT C/A/P = No CT evidence of acute intrathoracic or acute intra-abdominal pathology. Constipation. Distended fluid-filled stomach proximal to nonspecific moderate wall thickening of the distal gastric body/antrum. Gastroenterology consultation is recommended.  - RUQ US = No cholelithiasis or gallbladder wall thickening.  - Patient denies being constipated currently  - Laxatives and suppository  - Restarted diet, had BM   -Outpt EGD with GI.   Problem/Plan - 3:  ·  Problem: HTN (hypertension).   ·  Plan: - C/w home amlodipine 10mg, lisinopril 40mg.   Problem/Plan - 4:  ·  Problem: DM (diabetes mellitus).   ·  Plan: -Resume home med   Problem/Plan - 5:  ·  Problem: HLD (hyperlipidemia).   ·  Plan: - C/w Atorvastatin 80mg, Ezetimibe 10mg.  Outpt follow up with thyroid nodule, daughter is aware.

## 2025-01-31 NOTE — DISCHARGE NOTE PROVIDER - CARE PROVIDERS DIRECT ADDRESSES
,DirectAddress_Unknown,[email or web address],DirectAddress_Unknown

## 2025-01-31 NOTE — DISCHARGE NOTE PROVIDER - NSDCMRMEDTOKEN_GEN_ALL_CORE_FT
amLODIPine 10 mg oral tablet: 1 tab(s) orally once a day  aspirin 81 mg oral tablet, chewable: 1 tab(s) orally once a day  atorvastatin 80 mg oral tablet: 1 tab(s) orally once a day (at bedtime)  Janumet 50 mg-1000 mg oral tablet: 1 tab(s) orally 2 times a day  Jardiance 10 mg oral tablet: 1 tab(s) orally once a day  levETIRAcetam 500 mg oral tablet: 1 tab(s) orally 2 times a day  lisinopril 40 mg oral tablet: 1 tab(s) orally once a day  senna leaf extract oral tablet: 2 tab(s) orally once a day (at bedtime) as needed for  constipation  Zetia 10 mg oral tablet: 1 tab(s) orally once a day

## 2025-01-31 NOTE — DISCHARGE NOTE NURSING/CASE MANAGEMENT/SOCIAL WORK - PATIENT PORTAL LINK FT
You can access the FollowMyHealth Patient Portal offered by [facility] by registering at the following website: [email or web address]. By joining [facility]’s FollowMyHealth portal, you will also be able to view your health information using other applications (apps) compatible with our system.

## 2025-01-31 NOTE — DISCHARGE NOTE PROVIDER - NSDCFUSCHEDAPPT_GEN_ALL_CORE_FT
Reece Mcgee Physician [facility]  NEUROLOGY [address] [place] [address]  Scheduled Appointment: 02/10/2025

## 2025-02-10 ENCOUNTER — APPOINTMENT (OUTPATIENT)
Dept: NEUROLOGY | Facility: CLINIC | Age: 80
End: 2025-02-10
Payer: MEDICARE

## 2025-02-10 VITALS
SYSTOLIC BLOOD PRESSURE: 104 MMHG | BODY MASS INDEX: 25.49 KG/M2 | HEIGHT: 65 IN | HEART RATE: 96 BPM | WEIGHT: 153 LBS | OXYGEN SATURATION: 96 % | DIASTOLIC BLOOD PRESSURE: 50 MMHG

## 2025-02-10 DIAGNOSIS — R56.9 UNSPECIFIED CONVULSIONS: ICD-10-CM

## 2025-02-10 DIAGNOSIS — I67.9 CEREBROVASCULAR DISEASE, UNSPECIFIED: ICD-10-CM

## 2025-02-10 PROCEDURE — 99215 OFFICE O/P EST HI 40 MIN: CPT

## 2025-02-10 PROCEDURE — G2211 COMPLEX E/M VISIT ADD ON: CPT

## 2025-02-10 RX ORDER — LEVETIRACETAM 500 MG/1
500 TABLET, FILM COATED ORAL
Qty: 180 | Refills: 3 | Status: ACTIVE | COMMUNITY
Start: 2025-02-10 | End: 1900-01-01

## 2025-02-12 NOTE — ED ADULT TRIAGE NOTE - NS ED NURSE BANDS TYPE
17 yo female, G0 presents with mother for contraceptive management   Pt not sexually active but has boyfriend and considering contraception  Menses q month on average  Nonsmoker  History reviewed. No pertinent past medical history.    History reviewed. No pertinent surgical history.    Family History   Problem Relation Name Age of Onset    Breast cancer Maternal Grandmother      Ovarian cancer Neg Hx         Social History     Socioeconomic History    Marital status: Single   Tobacco Use    Smoking status: Never   Substance and Sexual Activity    Alcohol use: Yes     Comment: rarely    Drug use: Never    Sexual activity: Never       Current Outpatient Medications   Medication Sig Dispense Refill    norethindrone-ethinyl estradiol (MICROGESTIN 1/20) 1-20 mg-mcg per tablet Take 1 tablet by mouth once daily. 84 tablet 3    ondansetron (ZOFRAN-ODT) 4 MG TbDL Take 1 tablet (4 mg total) by mouth every 6 (six) hours as needed (nausea). (Patient not taking: Reported on 2/12/2025) 10 tablet 0    ondansetron (ZOFRAN-ODT) 4 MG TbDL Take 1 tablet (4 mg total) by mouth every 6 (six) hours as needed (nausea). (Patient not taking: Reported on 2/12/2025) 15 tablet 0     No current facility-administered medications for this visit.       Review of patient's allergies indicates:  No Known Allergies    Review of System:   General: no chills, fever, night sweats, weight gain or weight loss  Psychological: no depression or suicidal ideation  Breasts: no new or changing breast lumps, nipple discharge or masses.  Respiratory: no cough, shortness of breath, or wheezing  Cardiovascular: no chest pain or dyspnea on exertion  Gastrointestinal: no abdominal pain, change in bowel habits, or black or bloody stools  Genito-Urinary: no incontinence, urinary frequency/urgency or vulvar/vaginal symptoms, pelvic pain or abnormal vaginal bleeding.  Musculoskeletal: no gait disturbance or muscular weakness     I discussed risks and benefits of OCPs and  discussed alternatives including patch, injection, Nuvaring and IUD/Implant  After discussion, pt and mother desire OCP trial  In addition, I discussed and rec Gardasil series which pt will consider  Dosing discussed  Answered all questions and will follow    I spent a total of 30 minutes on the day of the visit. This includes face to face time and non-face to face time preparing to see the patient (eg, review of tests), obtaining and/or reviewing separately obtained history, documenting clinical information in the electronic or other health record, independently interpreting results and communicating results to the patient/family/caregiver, or care coordinator.        Name band;

## 2025-02-23 ENCOUNTER — NON-APPOINTMENT (OUTPATIENT)
Age: 80
End: 2025-02-23

## 2025-02-23 ENCOUNTER — EMERGENCY (EMERGENCY)
Facility: HOSPITAL | Age: 80
LOS: 0 days | Discharge: HOME | End: 2025-02-24
Attending: STUDENT IN AN ORGANIZED HEALTH CARE EDUCATION/TRAINING PROGRAM
Payer: MEDICARE

## 2025-02-23 VITALS
WEIGHT: 149.91 LBS | HEART RATE: 94 BPM | SYSTOLIC BLOOD PRESSURE: 145 MMHG | RESPIRATION RATE: 19 BRPM | HEIGHT: 65 IN | OXYGEN SATURATION: 95 % | DIASTOLIC BLOOD PRESSURE: 82 MMHG | TEMPERATURE: 98 F

## 2025-02-23 DIAGNOSIS — R09.81 NASAL CONGESTION: ICD-10-CM

## 2025-02-23 DIAGNOSIS — R13.10 DYSPHAGIA, UNSPECIFIED: ICD-10-CM

## 2025-02-23 DIAGNOSIS — M54.2 CERVICALGIA: ICD-10-CM

## 2025-02-23 DIAGNOSIS — Z91.013 ALLERGY TO SEAFOOD: ICD-10-CM

## 2025-02-23 DIAGNOSIS — T78.3XXA ANGIONEUROTIC EDEMA, INITIAL ENCOUNTER: ICD-10-CM

## 2025-02-23 DIAGNOSIS — E78.5 HYPERLIPIDEMIA, UNSPECIFIED: ICD-10-CM

## 2025-02-23 DIAGNOSIS — J02.9 ACUTE PHARYNGITIS, UNSPECIFIED: ICD-10-CM
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
## 2025-02-23 DIAGNOSIS — I10 ESSENTIAL (PRIMARY) HYPERTENSION: ICD-10-CM

## 2025-02-23 DIAGNOSIS — E11.9 TYPE 2 DIABETES MELLITUS WITHOUT COMPLICATIONS: ICD-10-CM

## 2025-02-23 DIAGNOSIS — Z88.1 ALLERGY STATUS TO OTHER ANTIBIOTIC AGENTS: ICD-10-CM

## 2025-02-23 DIAGNOSIS — Z86.73 PERSONAL HISTORY OF TRANSIENT ISCHEMIC ATTACK (TIA), AND CEREBRAL INFARCTION WITHOUT RESIDUAL DEFICITS: ICD-10-CM
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
## 2025-02-23 LAB
ALBUMIN SERPL ELPH-MCNC: 4 G/DL — SIGNIFICANT CHANGE UP (ref 3.3–5)
ALP SERPL-CCNC: 85 U/L — SIGNIFICANT CHANGE UP (ref 40–120)
ALT FLD-CCNC: 20 U/L — SIGNIFICANT CHANGE UP (ref 12–78)
ANION GAP SERPL CALC-SCNC: 6 MMOL/L — SIGNIFICANT CHANGE UP (ref 5–17)
APTT BLD: 30.3 SEC — SIGNIFICANT CHANGE UP (ref 24.5–35.6)
AST SERPL-CCNC: 21 U/L — SIGNIFICANT CHANGE UP (ref 15–37)
BASOPHILS # BLD AUTO: 0.06 K/UL — SIGNIFICANT CHANGE UP (ref 0–0.2)
BASOPHILS NFR BLD AUTO: 0.5 % — SIGNIFICANT CHANGE UP (ref 0–2)
BILIRUB SERPL-MCNC: 1.1 MG/DL — SIGNIFICANT CHANGE UP (ref 0.2–1.2)
BLD GP AB SCN SERPL QL: SIGNIFICANT CHANGE UP
BUN SERPL-MCNC: 11 MG/DL — SIGNIFICANT CHANGE UP (ref 7–23)
CALCIUM SERPL-MCNC: 10.6 MG/DL — HIGH (ref 8.5–10.1)
CHLORIDE SERPL-SCNC: 108 MMOL/L — SIGNIFICANT CHANGE UP (ref 96–108)
CO2 SERPL-SCNC: 24 MMOL/L — SIGNIFICANT CHANGE UP (ref 22–31)
CREAT SERPL-MCNC: 0.88 MG/DL — SIGNIFICANT CHANGE UP (ref 0.5–1.3)
EGFR: 67 ML/MIN/1.73M2 — SIGNIFICANT CHANGE UP
EOSINOPHIL # BLD AUTO: 0.13 K/UL — SIGNIFICANT CHANGE UP (ref 0–0.5)
EOSINOPHIL NFR BLD AUTO: 1.2 % — SIGNIFICANT CHANGE UP (ref 0–6)
FLUAV AG NPH QL: SIGNIFICANT CHANGE UP
FLUBV AG NPH QL: SIGNIFICANT CHANGE UP
GLUCOSE SERPL-MCNC: 100 MG/DL — HIGH (ref 70–99)
HCT VFR BLD CALC: 35.2 % — SIGNIFICANT CHANGE UP (ref 34.5–45)
HGB BLD-MCNC: 11.5 G/DL — SIGNIFICANT CHANGE UP (ref 11.5–15.5)
IMM GRANULOCYTES NFR BLD AUTO: 0.4 % — SIGNIFICANT CHANGE UP (ref 0–0.9)
INR BLD: 1.04 RATIO — SIGNIFICANT CHANGE UP (ref 0.85–1.16)
LYMPHOCYTES # BLD AUTO: 27.3 % — SIGNIFICANT CHANGE UP (ref 13–44)
LYMPHOCYTES # BLD AUTO: 3.02 K/UL — SIGNIFICANT CHANGE UP (ref 1–3.3)
MCHC RBC-ENTMCNC: 28.5 PG — SIGNIFICANT CHANGE UP (ref 27–34)
MCHC RBC-ENTMCNC: 32.7 G/DL — SIGNIFICANT CHANGE UP (ref 32–36)
MCV RBC AUTO: 87.1 FL — SIGNIFICANT CHANGE UP (ref 80–100)
MONOCYTES # BLD AUTO: 0.87 K/UL — SIGNIFICANT CHANGE UP (ref 0–0.9)
MONOCYTES NFR BLD AUTO: 7.9 % — SIGNIFICANT CHANGE UP (ref 2–14)
NEUTROPHILS # BLD AUTO: 6.96 K/UL — SIGNIFICANT CHANGE UP (ref 1.8–7.4)
NEUTROPHILS NFR BLD AUTO: 62.7 % — SIGNIFICANT CHANGE UP (ref 43–77)
NRBC BLD AUTO-RTO: 0 /100 WBCS — SIGNIFICANT CHANGE UP (ref 0–0)
PLATELET # BLD AUTO: 241 K/UL — SIGNIFICANT CHANGE UP (ref 150–400)
POTASSIUM SERPL-MCNC: 3.8 MMOL/L — SIGNIFICANT CHANGE UP (ref 3.5–5.3)
POTASSIUM SERPL-SCNC: 3.8 MMOL/L — SIGNIFICANT CHANGE UP (ref 3.5–5.3)
PROT SERPL-MCNC: 8.3 GM/DL — SIGNIFICANT CHANGE UP (ref 6–8.3)
PROTHROM AB SERPL-ACNC: 12.1 SEC — SIGNIFICANT CHANGE UP (ref 9.9–13.4)
RBC # BLD: 4.04 M/UL — SIGNIFICANT CHANGE UP (ref 3.8–5.2)
RBC # FLD: 16.5 % — HIGH (ref 10.3–14.5)
RSV RNA NPH QL NAA+NON-PROBE: SIGNIFICANT CHANGE UP
SARS-COV-2 RNA SPEC QL NAA+PROBE: SIGNIFICANT CHANGE UP
SODIUM SERPL-SCNC: 138 MMOL/L — SIGNIFICANT CHANGE UP (ref 135–145)
WBC # BLD: 11.08 K/UL — HIGH (ref 3.8–10.5)
WBC # FLD AUTO: 11.08 K/UL — HIGH (ref 3.8–10.5)

## 2025-02-23 PROCEDURE — 99285 EMERGENCY DEPT VISIT HI MDM: CPT

## 2025-02-23 RX ORDER — FAMOTIDINE 10 MG/ML
20 INJECTION INTRAVENOUS ONCE
Refills: 0 | Status: COMPLETED | OUTPATIENT
Start: 2025-02-23 | End: 2025-02-23

## 2025-02-23 RX ORDER — DIPHENHYDRAMINE HCL 25 MG
50 CAPSULE ORAL ONCE
Refills: 0 | Status: COMPLETED | OUTPATIENT
Start: 2025-02-23 | End: 2025-02-23

## 2025-02-23 RX ORDER — DEXAMETHASONE SODIUM PHOSPHATE 4 MG/ML
10 INJECTION, SOLUTION INTRA-ARTICULAR; INTRALESIONAL; INTRAMUSCULAR; INTRAVENOUS; SOFT TISSUE ONCE
Refills: 0 | Status: COMPLETED | OUTPATIENT
Start: 2025-02-23 | End: 2025-02-23

## 2025-02-23 RX ORDER — LIDOCAINE HYDROCHLORIDE 30 MG/G
1 CREAM TOPICAL ONCE
Refills: 0 | Status: COMPLETED | OUTPATIENT
Start: 2025-02-23 | End: 2025-02-24

## 2025-02-23 RX ADMIN — Medication 50 MILLIGRAM(S): at 21:27

## 2025-02-23 RX ADMIN — DEXAMETHASONE SODIUM PHOSPHATE 10 MILLIGRAM(S): 4 INJECTION, SOLUTION INTRA-ARTICULAR; INTRALESIONAL; INTRAMUSCULAR; INTRAVENOUS; SOFT TISSUE at 21:28

## 2025-02-23 RX ADMIN — FAMOTIDINE 20 MILLIGRAM(S): 10 INJECTION INTRAVENOUS at 21:27

## 2025-02-23 NOTE — ED ADULT NURSE REASSESSMENT NOTE - NS ED NURSE REASSESS COMMENT FT1
Daughter at bedside, Ninfa, requesting to contact her with any updates regarding her mothers care.  number on file.

## 2025-02-23 NOTE — ED ADULT NURSE NOTE - NS ED NURSE LEVEL OF CONSCIOUSNESS MENTAL STATUS
Hemodialysis / [phone]    Vitals Pre Post Assessment Pre Post   BP BP: 138/73 (05/27/22 0800) 151/60 LOC A&O x2 A&Ox2   HR Pulse (Heart Rate): 97 (05/27/22 0800) 74 Lungs Clear Clear   Resp Resp Rate: 21 (05/27/22 0800) 20 Cardiac Afib NS   Temp Temp: 98.4 °F (36.9 °C) (05/27/22 0800) 98.2 Skin Dry/Warm Dry/Warm   Weight    Edema +1 (legs) +1 (legs)   Tele status   Pain Pain Intensity 1: 0 (05/27/22 0800)      Orders   Duration: Start: 0820 End: 1135 Total: 3.25   Dialyzer: Dialyzer/Set Up Inspection: Revaclear (05/27/22 0800)   K [place]: Dialysate K (mEq/L): 2 (05/27/22 0800)   Ca [place]: Dialysate CA (mEq/L): 2.5 (05/27/22 0800)   Na: Dialysate NA (mEq/L): 140 (05/27/22 0800)   Bicarb: Dialysate HCO3 (mEq/L): 40 (05/27/22 0800)   Target Fluid Removal:       Access   Type & Location: LIJ non-tunneled CVC   Comments: CDI with transparent dressing and biopatch in place. Hubs and limbs of CVC cleansed per policy. +aspiration/+flush on blue port, sluggish aspiration/flush report.      Labs   HBsAg (Antigen) / date: Negative 5/24/22                     HBsAb (Antibody) / date: Susceptible 5/24/22   Source:    Obtained/Reviewed  Critical Results Called HGB   Date Value Ref Range Status   05/27/2022 9.0 (L) 11.5 - 16.0 g/dL Final     Potassium   Date Value Ref Range Status   05/27/2022 4.4 3.5 - 5.1 mmol/L Final     Calcium   Date Value Ref Range Status   05/27/2022 7.2 (L) 8.5 - 10.1 MG/DL Final     BUN   Date Value Ref Range Status   05/27/2022 31 (H) 6 - 20 MG/DL Final     Creatinine   Date Value Ref Range Status   05/27/2022 2.64 (H) 0.55 - 1.02 MG/DL Final        Meds Given   Name Dose Route                    Adequacy / Fluid    Total Liters Process: 60.6   Net Fluid Removed: 2L      Comments   Time Out Done:   (Time) 0800   Admitting Diagnosis: DKA/Sinus Kenton/GLORIA   Consent obtained/signed: Informed Consent Verified: Yes (05/27/22 0800)   Machine / RO # Machine Number: [id number] (05/27/22 0800)   Primary Nurse Rpt Pre: LISBET Marcos RN   Primary Nurse Rpt Post: LISBET Marcos RN   Pt Education: HD vs CRRT    Care Plan: MWF HD   Pts outpatient clinic: N/A     Tx Summary   Comments:    Dialysis order, medication, labs and consent verified pre dialysis. Time Out, ICEBOAT done pre dialysis. 0820: HD started, access alarms noted. Lines flushed  [id number]: Access alarms continued, Lines flushed and reversed,patient repositioned. 0830: BFR decreased. 0845: Patient repositioned again, this time towards the access. 0900:Patient resting comfortably with call bell within reach. 7702: Dr. Ousmane Quevedo at patient bedside, verbal orders given to increase UF to 2-2.5L   1140: Treatment completed without complication. All possible blood returned. Lines flushed with NS and capped. Awake/Alert/Cooperative

## 2025-02-23 NOTE — PHARMACOTHERAPY INTERVENTION NOTE - COMMENTS
S/w Dr. Arellano the max IV Push dose for dexamethasone is 6mg per hospital policy. Suggested to change to IVPB if he wants 10mg. MD insisted to give 10mg IVP for allergic reaction.  S/w Dr. Arellano the max IV Push dose for dexamethasone is 6mg per hospital policy. Suggested to change to IVPB if he wants 10mg. MD insisted to give 10mg IV Push for allergic reaction.

## 2025-02-23 NOTE — ED ADULT NURSE NOTE - OBJECTIVE STATEMENT
Pt AAOx4. 79 year old female presents to ED with complaint of difficulty swallowing since yesterday, pt sent from urgent care. Pt denies shortness of breath. Enlarged uvula, pt endorsing bilateral neck pain. Pt speaking in complete sentences with no issues. Respirations equal and unlabored. No acute distress noted at this time. Patient placed on cardiac and SpO2 monitor at bedside.

## 2025-02-24 VITALS
RESPIRATION RATE: 13 BRPM | HEART RATE: 85 BPM | DIASTOLIC BLOOD PRESSURE: 59 MMHG | SYSTOLIC BLOOD PRESSURE: 118 MMHG | TEMPERATURE: 98 F | OXYGEN SATURATION: 99 %

## 2025-02-24 LAB — S PYO DNA THROAT QL NAA+PROBE: SIGNIFICANT CHANGE UP

## 2025-02-24 PROCEDURE — 71045 X-RAY EXAM CHEST 1 VIEW: CPT | Mod: 26

## 2025-02-24 RX ORDER — ACETAMINOPHEN 160 MG/5ML
1000 SUSPENSION ORAL ONCE
Refills: 0 | Status: COMPLETED | OUTPATIENT
Start: 2025-02-24 | End: 2025-02-24

## 2025-02-24 RX ADMIN — LIDOCAINE HYDROCHLORIDE 1 PATCH: 30 CREAM TOPICAL at 00:49

## 2025-02-24 RX ADMIN — ACETAMINOPHEN 400 MILLIGRAM(S): 160 SUSPENSION ORAL at 00:49

## 2025-02-24 NOTE — ED PROVIDER NOTE - PATIENT PORTAL LINK FT
You can access the FollowMyHealth Patient Portal offered by [facility] by registering at the following website: [email or web address]. By joining [employer]’s FollowMyHealth portal, you will also be able to view your health information using other applications (apps) compatible with our system.

## 2025-02-24 NOTE — ED PROVIDER NOTE - CONSIDERATION OF ADMISSION OBSERVATION
Consideration of Admission/Observation considered admission if persistent swelling, trouble swallowing or worsening symptoms. symptoms improved with meds, significant decrease in swelling. dc home considered admission if persistent swelling, trouble swallowing or worsening symptoms. symptoms improved with meds, significant decrease in swelling. ICU consulted and recs for ENT follow-up and dysphagia evaluations

## 2025-02-24 NOTE — CONSULT NOTE ADULT - SUBJECTIVE AND OBJECTIVE BOX
Patient is a 79y old  Female who presents with a chief complaint of     BRIEF HOSPITAL COURSE: 79 year old female PMHx DM2, CVA, HLD, Possible complex partial seizure (was put on Keppra 04/2024), Recent Hospitalization 1/2025 for  loss of consciousness concerning for seizure. Pt did not take Keppra for several days as she ran out.  Work up was reveling: CT brain with chronic R frontal and parietal infarcts and CTA with multifocal intracranial athero, mild to mod.  Presents to ED tonight with Left neck pain for 2 days.   She also reported to ED staff difficulty swallowing.  On Exam their was concern for swelling of her Uvula.     Events last 24 hours: Patient reporting to me that the difficulty swallowing has been ongoing for > 2 weeks, and describes it as non-painful. She denies any new RX and denies any prodrome of infection or allergy.  She denies any associated difficulty breathing or any sensation of airway compromise.      PAST MEDICAL & SURGICAL HISTORY:  DM (diabetes mellitus)  Mild HTN  HLD (hyperlipidemia)  CVA (cerebrovascular accident)        Review of Systems:  CONSTITUTIONAL: No fever, chills, or fatigue  EYES: No eye pain, visual disturbances, or discharge  ENMT:  No difficulty hearing, tinnitus, vertigo; No sinus or throat pain, As Above in HPI   NECK: No pain or stiffness  RESPIRATORY: No cough, wheezing, chills or hemoptysis; No shortness of breath  CARDIOVASCULAR: No chest pain, palpitations, dizziness, or leg swelling  GASTROINTESTINAL: No abdominal or epigastric pain. No nausea, vomiting, or hematemesis; No diarrhea or constipation. No melena or hematochezia.  GENITOURINARY: No dysuria, frequency, hematuria, or incontinence  NEUROLOGICAL: No headaches, memory loss, loss of strength, numbness, or tremors  SKIN: No itching, burning, rashes, or lesions   MUSCULOSKELETAL: No joint pain or swelling; No muscle, back, or extremity pain  PSYCHIATRIC: No depression, anxiety, mood swings, or difficulty sleeping      Medications:  lidocaine   4% Patch 1 Patch Transdermal Once        ICU Vital Signs Last 24 Hrs  T(C): 36.7 (23 Feb 2025 20:58), Max: 36.7 (23 Feb 2025 20:58)  T(F): 98 (23 Feb 2025 20:58), Max: 98 (23 Feb 2025 20:58)  HR: 78 (23 Feb 2025 23:59) (78 - 94)  BP: 112/54 (23 Feb 2025 23:59) (112/54 - 145/82)  RR: 12 (23 Feb 2025 23:59) (12 - 19)  SpO2: 97% (23 Feb 2025 23:59) (95% - 97%)    O2 Parameters below as of 23 Feb 2025 20:58  Patient On (Oxygen Delivery Method): room air              LABS:                        11.5   11.08 )-----------( 241      ( 23 Feb 2025 21:25 )             35.2     02-23    138  |  108  |  11  ----------------------------<  100[H]  3.8   |  24  |  0.88    Ca    10.6[H]      23 Feb 2025 21:25    TPro  8.3  /  Alb  4.0  /  TBili  1.1  /  DBili  x   /  AST  21  /  ALT  20  /  AlkPhos  85  02-23          CAPILLARY BLOOD GLUCOSE        PT/INR - ( 23 Feb 2025 21:35 )   PT: 12.1 sec;   INR: 1.04 ratio         PTT - ( 23 Feb 2025 21:35 )  PTT:30.3 sec  Urinalysis Basic - ( 23 Feb 2025 21:25 )    Color: x / Appearance: x / SG: x / pH: x  Gluc: 100 mg/dL / Ketone: x  / Bili: x / Urobili: x   Blood: x / Protein: x / Nitrite: x   Leuk Esterase: x / RBC: x / WBC x   Sq Epi: x / Non Sq Epi: x / Bacteria: x      CULTURES:      Physical Examination:    General:  Awake.  Alert, oriented, interactive, nonfocal    HEENT: Pupils equal, reactive to light.  Symmetric. No JVD.  Posterior soft pallet with mild enlargement  Uvula however does not appear swollen.  Mallampati Grade 3, No Angio edema of lips, tongue or oral cavity.    PULM: Clear to auscultation bilaterally, no significant sputum production    CVS: Regular rate and rhythm, no murmurs, rubs, or gallops    ABD: Soft, nondistended, nontender, normoactive bowel sounds, no masses    EXT: No edema, nontender    SKIN: Warm and well perfused, no rashes noted.            CRITICAL CARE TIME SPENT: 55 minutes   (Assessing presenting problems of acute illness, which pose high probability of life threatening deterioration or end organ damage/dysfunction, as well as medical decision making including initiating plan of care, reviewing data, reviewing radiologic exams, discussing with multidisciplinary team,  discussing goals of care with patient/family, and writing this note.  Non-inclusive of procedures performed)    Date of entry of this note is equal to the date of services rendered.

## 2025-02-24 NOTE — ED ADULT NURSE REASSESSMENT NOTE - NS ED NURSE REASSESS COMMENT FT1
Patient is AAOx4. Steady gait, ambulatory. IV removed. D/C per MD orders. D/C instructions provided and verbalizes understanding of medication regimen and follow up care. Educational material provided. Respirations equal and unlabored with no acute distress noted at this time. Pt denies any pain or complaints at this time. Daughter here to pick patient up.

## 2025-02-24 NOTE — ED PROVIDER NOTE - CLINICAL SUMMARY MEDICAL DECISION MAKING FREE TEXT BOX
79 F pmh HTN, HLD, DM, CVA presenting to the ED for neck pain x 2 days.     initial concern for airway swelling, uvular angioedema  s/p steroids, antihistamines  pt states that dysphagia symptoms have been present for ~ 2weeks    uvular swelling still improved after meds    no lymphadenopathy, neck pain appearing musculoskeletal   pain control  pt observed for hours with no worsening of symptoms  dispo home with ENT and primary doctor follow-up

## 2025-02-24 NOTE — ED PROVIDER NOTE - NS ED ROS FT
General: Denies fever, chills  HEENT: Denies sensory changes; + sore throat  Neck: + neck pain  Resp: Denies coughing, SOB  Cardiovascular: Denies CP, palpitations, LE edema  GI: Denies nausea, vomiting, abdominal pain, diarrhea, constipation, blood in stool  : Denies dysuria, hematuria, frequency, incontinence  MSK: Denies back pain  Neuro: Denies HA, dizziness, numbness, weakness  Skin: Denies rashes.

## 2025-02-24 NOTE — ED ADULT NURSE REASSESSMENT NOTE - NS ED NURSE REASSESS COMMENT FT1
covering for primary RN Kimberly, pt resting in bed asleep. NAD noted at this time. pt on cardiac monitor, vitals stable.  Plan of care ongoing

## 2025-02-24 NOTE — CONSULT NOTE ADULT - ASSESSMENT
79 year old female PMHx DM2, CVA, HLD, Possible complex partial seizure (was put on Keppra 04/2024), Recent Hospitalization 1/2025 for  loss of consciousness concerning for seizure. Pt did not take Keppra for several days as she ran out.  Work up was reveling: CT brain with chronic R frontal and parietal infarcts and CTA with multifocal intracranial athero, mild to mod.  Presents to ED tonight with Left neck pain for 2 days.   She also reported to ED staff difficulty swallowing.       Difficulty swallowing has been ongoing for > 2 week  Does not look like allergic rxn or ACE induced   No Role for ICU at this time   Consider OP ENT FU   Dysphagia CARMEN   Case D/W eICU Dr Pat

## 2025-02-24 NOTE — ED PROVIDER NOTE - NSFOLLOWUPCLINICS_GEN_ALL_ED_FT
[facility] - ENT  Otolaryngology (ENT)  [address] [place] [address]  Phone: [phone]  Fax:

## 2025-02-24 NOTE — ED PROVIDER NOTE - OBJECTIVE STATEMENT
79 F pmh HTN, HLD, DM, CVA presenting to the ED for neck pain. worsening neck pain x 2 days. pt complaining of difficulty and pain with swallowing over the past 2 weeks. mild cough, congestion but not with ingestion

## 2025-02-24 NOTE — ED PROVIDER NOTE - PHYSICAL EXAMINATION
General: Well appearing female in no acute distress  HEENT: Normocephalic, atraumatic. Moist mucous membranes. Oropharynx clear of exudate, + uvular edema. no erythema. No lymphadenopathy.  Eyes: No scleral icterus. EOMI. ANGEL.  Neck:. Soft and supple. Full ROM without pain. No midline tenderness  Cardiac: Regular rate and regular rhythm. Peripheral pulses 2+ and symmetric.  Resp: Lungs CTAB. Speaking in full sentences. No wheezes, rales or rhonchi.  Abd: Soft, non-tender, non-distended. No guarding or rebound.  Back: Spine midline and non-tender. No CVA tenderness.    Skin: No rashes, abrasions, or lacerations.  Neuro: AO x 3. Moves all extremities symmetrically. Motor strength and sensation grossly intact.

## 2025-02-26 LAB
C1INH FUNCTIONAL FLD-MCNC: 109 — SIGNIFICANT CHANGE UP
C1INH FUNCTIONAL/C1INH TOTAL MFR SERPL: 37 MG/DL — SIGNIFICANT CHANGE UP (ref 21–39)

## 2025-02-26 NOTE — PROCEDURE NOTE - NSTOLERANCE_GEN_A_CORE
Refill Request - Controlled Substance    CONFIRM preferred pharmacy with the patient.    If Mail Order Rx - Pend for 90 day refill.        Last Seen Department: 11/20/2024  Last Seen by PCP: 11/20/2024    Last Written: 1/30/25 #30 - 0 refills    Last UDS: 8/15/24    Med Agreement Signed On: 9/9/24    If no future appointment scheduled:  Review the last OV with PCP and review information for follow-up visit,  Route STAFF MESSAGE with patient name to the  Pool for scheduling with the following information:            -  Timing of next visit           -  Visit type ie Physical, OV, etc           -  Diagnoses/Reason ie. COPD, HTN - Do not use MEDICATION, Follow-up or CHECK UP - Give reason for visit        Next Appointment:   Future Appointments   Date Time Provider Department Center   3/26/2025  3:30 PM Princess Quezada, APRN - CNP [facility] DEP       Message sent to  to schedule appt with patient?  NO      Requested Prescriptions     Pending Prescriptions Disp Refills    ALPRAZolam (XANAX) 1 MG tablet 30 tablet 0     Sig: TAKE 1 TABLET BY MOUTH NIGHTLY AS NEEDED FOR SLEEP OR ANXIETY FOR UP TO 30 DAYS         
Patient tolerated procedure well.

## 2025-02-27 ENCOUNTER — APPOINTMENT (OUTPATIENT)
Dept: ENDOCRINOLOGY | Facility: CLINIC | Age: 80
End: 2025-02-27
Payer: MEDICARE

## 2025-02-27 VITALS
RESPIRATION RATE: 16 BRPM | HEART RATE: 93 BPM | HEIGHT: 65 IN | WEIGHT: 143 LBS | DIASTOLIC BLOOD PRESSURE: 82 MMHG | OXYGEN SATURATION: 97 % | SYSTOLIC BLOOD PRESSURE: 134 MMHG | BODY MASS INDEX: 23.82 KG/M2

## 2025-02-27 DIAGNOSIS — E11.65 TYPE 2 DIABETES MELLITUS WITH HYPERGLYCEMIA: ICD-10-CM

## 2025-02-27 DIAGNOSIS — M81.0 AGE-RELATED OSTEOPOROSIS W/OUT CURRENT PATHOLOGICAL FRACTURE: ICD-10-CM

## 2025-02-27 DIAGNOSIS — I10 ESSENTIAL (PRIMARY) HYPERTENSION: ICD-10-CM

## 2025-02-27 DIAGNOSIS — E78.5 HYPERLIPIDEMIA, UNSPECIFIED: ICD-10-CM

## 2025-02-27 DIAGNOSIS — E04.2 NONTOXIC MULTINODULAR GOITER: ICD-10-CM

## 2025-02-27 LAB — GLUCOSE BLDC GLUCOMTR-MCNC: 186

## 2025-02-27 PROCEDURE — G2211 COMPLEX E/M VISIT ADD ON: CPT

## 2025-02-27 PROCEDURE — 82962 GLUCOSE BLOOD TEST: CPT

## 2025-02-27 PROCEDURE — 99214 OFFICE O/P EST MOD 30 MIN: CPT

## 2025-02-27 PROCEDURE — 95251 CONT GLUC MNTR ANALYSIS I&R: CPT

## 2025-03-27 ENCOUNTER — APPOINTMENT (OUTPATIENT)
Dept: GASTROENTEROLOGY | Facility: CLINIC | Age: 80
End: 2025-03-27
Payer: MEDICARE

## 2025-03-27 VITALS
RESPIRATION RATE: 16 BRPM | HEART RATE: 77 BPM | SYSTOLIC BLOOD PRESSURE: 140 MMHG | WEIGHT: 140 LBS | HEIGHT: 65 IN | DIASTOLIC BLOOD PRESSURE: 87 MMHG | OXYGEN SATURATION: 95 % | BODY MASS INDEX: 23.32 KG/M2

## 2025-03-27 DIAGNOSIS — R10.13 EPIGASTRIC PAIN: ICD-10-CM

## 2025-03-27 DIAGNOSIS — K44.9 DIAPHRAGMATIC HERNIA W/OUT OBSTRUCTION OR GANGRENE: ICD-10-CM

## 2025-03-27 DIAGNOSIS — Z86.73 PERSONAL HISTORY OF TRANSIENT ISCHEMIC ATTACK (TIA), AND CEREBRAL INFARCTION W/OUT RESIDUAL DEFICITS: ICD-10-CM

## 2025-03-27 DIAGNOSIS — K92.1 MELENA: ICD-10-CM

## 2025-03-27 DIAGNOSIS — R19.4 CHANGE IN BOWEL HABIT: ICD-10-CM

## 2025-03-27 DIAGNOSIS — K20.90 ESOPHAGITIS, UNSPECIFIED WITHOUT BLEEDING: ICD-10-CM

## 2025-03-27 PROCEDURE — 99203 OFFICE O/P NEW LOW 30 MIN: CPT

## 2025-03-27 RX ORDER — PANTOPRAZOLE 40 MG/1
40 TABLET, DELAYED RELEASE ORAL DAILY
Qty: 90 | Refills: 3 | Status: ACTIVE | COMMUNITY
Start: 2025-03-27 | End: 1900-01-01

## 2025-03-28 PROBLEM — R10.13 ABDOMINAL PAIN, EPIGASTRIC: Status: ACTIVE | Noted: 2025-03-28

## 2025-03-28 PROBLEM — K20.90 ESOPHAGITIS: Status: ACTIVE | Noted: 2025-03-27

## 2025-03-28 PROBLEM — K44.9 HIATAL HERNIA: Status: ACTIVE | Noted: 2025-03-28

## 2025-03-28 PROBLEM — K92.1 MELENA: Status: ACTIVE | Noted: 2025-03-28

## 2025-03-28 PROBLEM — R19.4 CHANGE IN BOWEL HABITS: Status: ACTIVE | Noted: 2025-03-28

## 2025-03-28 PROBLEM — R10.13 DYSPEPSIA: Status: ACTIVE | Noted: 2025-03-28

## 2025-04-11 ENCOUNTER — LABORATORY RESULT (OUTPATIENT)
Age: 80
End: 2025-04-11

## 2025-04-11 ENCOUNTER — APPOINTMENT (OUTPATIENT)
Dept: ORTHOPEDIC SURGERY | Facility: CLINIC | Age: 80
End: 2025-04-11
Payer: MEDICARE

## 2025-04-11 VITALS — WEIGHT: 140 LBS | HEIGHT: 65 IN | BODY MASS INDEX: 23.32 KG/M2

## 2025-04-11 DIAGNOSIS — M25.461 EFFUSION, RIGHT KNEE: ICD-10-CM

## 2025-04-11 DIAGNOSIS — M17.11 UNILATERAL PRIMARY OSTEOARTHRITIS, RIGHT KNEE: ICD-10-CM

## 2025-04-11 DIAGNOSIS — M25.569 PAIN IN UNSPECIFIED KNEE: ICD-10-CM

## 2025-04-11 PROCEDURE — 73562 X-RAY EXAM OF KNEE 3: CPT | Mod: RT

## 2025-04-11 PROCEDURE — 99204 OFFICE O/P NEW MOD 45 MIN: CPT | Mod: 25

## 2025-04-11 PROCEDURE — 20611 DRAIN/INJ JOINT/BURSA W/US: CPT | Mod: RT

## 2025-04-11 PROCEDURE — J3490M: CUSTOM | Mod: NC,JZ

## 2025-04-11 RX ORDER — IBUPROFEN 600 MG/1
600 TABLET, FILM COATED ORAL TWICE DAILY
Qty: 60 | Refills: 2 | Status: ACTIVE | COMMUNITY
Start: 2025-04-11 | End: 1900-01-01

## 2025-04-17 ENCOUNTER — EMERGENCY (EMERGENCY)
Facility: HOSPITAL | Age: 80
LOS: 0 days | Discharge: HOME | End: 2025-04-17
Payer: MEDICARE

## 2025-04-17 VITALS
OXYGEN SATURATION: 98 % | HEART RATE: 102 BPM | SYSTOLIC BLOOD PRESSURE: 159 MMHG | DIASTOLIC BLOOD PRESSURE: 98 MMHG | TEMPERATURE: 98 F | WEIGHT: 145.06 LBS | HEIGHT: 65 IN | RESPIRATION RATE: 14 BRPM

## 2025-04-17 VITALS
DIASTOLIC BLOOD PRESSURE: 71 MMHG | RESPIRATION RATE: 18 BRPM | HEART RATE: 75 BPM | SYSTOLIC BLOOD PRESSURE: 140 MMHG | OXYGEN SATURATION: 99 %

## 2025-04-17 LAB
ALBUMIN SERPL ELPH-MCNC: 3.5 G/DL — SIGNIFICANT CHANGE UP (ref 3.3–5)
ALP SERPL-CCNC: 80 U/L — SIGNIFICANT CHANGE UP (ref 40–120)
ALT FLD-CCNC: 20 U/L — SIGNIFICANT CHANGE UP (ref 12–78)
ANION GAP SERPL CALC-SCNC: 12 MMOL/L — SIGNIFICANT CHANGE UP (ref 5–17)
APPEARANCE UR: CLEAR — SIGNIFICANT CHANGE UP
AST SERPL-CCNC: 16 U/L — SIGNIFICANT CHANGE UP (ref 15–37)
BACTERIA # UR AUTO: ABNORMAL /HPF
BASOPHILS # BLD AUTO: 0.07 K/UL — SIGNIFICANT CHANGE UP (ref 0–0.2)
BASOPHILS NFR BLD AUTO: 0.6 % — SIGNIFICANT CHANGE UP (ref 0–2)
BILIRUB SERPL-MCNC: 0.3 MG/DL — SIGNIFICANT CHANGE UP (ref 0.2–1.2)
BILIRUB UR-MCNC: NEGATIVE — SIGNIFICANT CHANGE UP
BUN SERPL-MCNC: 16 MG/DL — SIGNIFICANT CHANGE UP (ref 7–23)
CALCIUM SERPL-MCNC: 10.5 MG/DL — HIGH (ref 8.5–10.1)
CHLORIDE SERPL-SCNC: 108 MMOL/L — SIGNIFICANT CHANGE UP (ref 96–108)
CO2 SERPL-SCNC: 22 MMOL/L — SIGNIFICANT CHANGE UP (ref 22–31)
COLOR SPEC: YELLOW — SIGNIFICANT CHANGE UP
COMMENT - URINE: SIGNIFICANT CHANGE UP
CREAT SERPL-MCNC: 0.9 MG/DL — SIGNIFICANT CHANGE UP (ref 0.5–1.3)
DIFF PNL FLD: NEGATIVE — SIGNIFICANT CHANGE UP
EGFR: 65 ML/MIN/1.73M2 — SIGNIFICANT CHANGE UP
EGFR: 65 ML/MIN/1.73M2 — SIGNIFICANT CHANGE UP
EOSINOPHIL # BLD AUTO: 0.18 K/UL — SIGNIFICANT CHANGE UP (ref 0–0.5)
EOSINOPHIL NFR BLD AUTO: 1.5 % — SIGNIFICANT CHANGE UP (ref 0–6)
EPI CELLS # UR: PRESENT
GLUCOSE SERPL-MCNC: 160 MG/DL — HIGH (ref 70–99)
GLUCOSE UR QL: >=1000 MG/DL
HCT VFR BLD CALC: 34.3 % — LOW (ref 34.5–45)
HGB BLD-MCNC: 11.2 G/DL — LOW (ref 11.5–15.5)
IMM GRANULOCYTES NFR BLD AUTO: 0.6 % — SIGNIFICANT CHANGE UP (ref 0–0.9)
JOINT CULTURE: NORMAL
KETONES UR-MCNC: NEGATIVE MG/DL — SIGNIFICANT CHANGE UP
LEUKOCYTE ESTERASE UR-ACNC: NEGATIVE — SIGNIFICANT CHANGE UP
LYMPHOCYTES # BLD AUTO: 25.4 % — SIGNIFICANT CHANGE UP (ref 13–44)
LYMPHOCYTES # BLD AUTO: 3.04 K/UL — SIGNIFICANT CHANGE UP (ref 1–3.3)
MCHC RBC-ENTMCNC: 28.5 PG — SIGNIFICANT CHANGE UP (ref 27–34)
MCHC RBC-ENTMCNC: 32.7 G/DL — SIGNIFICANT CHANGE UP (ref 32–36)
MCV RBC AUTO: 87.3 FL — SIGNIFICANT CHANGE UP (ref 80–100)
MONOCYTES # BLD AUTO: 0.83 K/UL — SIGNIFICANT CHANGE UP (ref 0–0.9)
MONOCYTES NFR BLD AUTO: 6.9 % — SIGNIFICANT CHANGE UP (ref 2–14)
NEUTROPHILS # BLD AUTO: 7.79 K/UL — HIGH (ref 1.8–7.4)
NEUTROPHILS NFR BLD AUTO: 65 % — SIGNIFICANT CHANGE UP (ref 43–77)
NITRITE UR-MCNC: NEGATIVE — SIGNIFICANT CHANGE UP
NRBC BLD AUTO-RTO: 0 /100 WBCS — SIGNIFICANT CHANGE UP (ref 0–0)
PH UR: 5.5 — SIGNIFICANT CHANGE UP (ref 5–8)
PLATELET # BLD AUTO: 241 K/UL — SIGNIFICANT CHANGE UP (ref 150–400)
POTASSIUM SERPL-MCNC: 4 MMOL/L — SIGNIFICANT CHANGE UP (ref 3.5–5.3)
POTASSIUM SERPL-SCNC: 4 MMOL/L — SIGNIFICANT CHANGE UP (ref 3.5–5.3)
PROT SERPL-MCNC: 7.9 GM/DL — SIGNIFICANT CHANGE UP (ref 6–8.3)
PROT UR-MCNC: 100 MG/DL
RBC # BLD: 3.93 M/UL — SIGNIFICANT CHANGE UP (ref 3.8–5.2)
RBC # FLD: 15.8 % — HIGH (ref 10.3–14.5)
RBC CASTS # UR COMP ASSIST: 4 /HPF — SIGNIFICANT CHANGE UP (ref 0–4)
SODIUM SERPL-SCNC: 142 MMOL/L — SIGNIFICANT CHANGE UP (ref 135–145)
SP GR SPEC: >1.03 — HIGH (ref 1–1.03)
UROBILINOGEN FLD QL: 0.2 MG/DL — SIGNIFICANT CHANGE UP (ref 0.2–1)
WBC # BLD: 11.98 K/UL — HIGH (ref 3.8–10.5)
WBC # FLD AUTO: 11.98 K/UL — HIGH (ref 3.8–10.5)
WBC UR QL: 5 /HPF — SIGNIFICANT CHANGE UP (ref 0–5)

## 2025-04-17 PROCEDURE — 71045 X-RAY EXAM CHEST 1 VIEW: CPT | Mod: 26

## 2025-04-17 PROCEDURE — 70450 CT HEAD/BRAIN W/O DYE: CPT | Mod: 26

## 2025-04-17 PROCEDURE — 99285 EMERGENCY DEPT VISIT HI MDM: CPT

## 2025-04-17 PROCEDURE — 93010 ELECTROCARDIOGRAM REPORT: CPT

## 2025-04-17 RX ORDER — LEVETIRACETAM 10 MG/ML
1000 INJECTION, SOLUTION INTRAVENOUS ONCE
Refills: 0 | Status: COMPLETED | OUTPATIENT
Start: 2025-04-17 | End: 2025-04-17

## 2025-04-17 RX ADMIN — LEVETIRACETAM 400 MILLIGRAM(S): 10 INJECTION, SOLUTION INTRAVENOUS at 18:21

## 2025-04-17 RX ADMIN — Medication 1000 MILLILITER(S): at 18:22

## 2025-04-17 NOTE — ED PROVIDER NOTE - NSFOLLOWUPINSTRUCTIONS_ED_ALL_ED_FT
- Please follow-up with your Neurologist Dr. Sparks as discussed.  - Follow-up with your Primary Care Physician within the next week.    Medications  - Please continue your Seizure Medication (Keppra) as prescribed by your Neurologist.    Advance activity as tolerated.  Continue all previously prescribed medications as directed unless otherwise instructed.  Follow up with your primary care physician in 48-72 hours- bring copies of your results.  Return to the ER for worsening or persistent symptoms, and/or ANY NEW OR CONCERNING SYMPTOMS such as fever, chest pain, shortness of breath, abdominal pain, or headaches. If you have issues obtaining follow up, please call: [phone] to obtain a doctor or specialist who takes your insurance in your area.  You may call [phone] to make an appointment with the internal medicine clinic.      A seizure is a sudden burst of abnormal electrical activity in the brain. Seizures usually last from 30 seconds to 2 minutes. The abnormal activity temporarily interrupts normal brain function.    Many types of seizures can affect adults. A seizure can cause many different symptoms depending on where in the brain it starts.    What are the causes?  Common causes of this condition include:    Fever or infection.  Brain injury, head trauma, bleeding in the brain, or tumor.  Low blood sugar.  Metabolic disorders or other conditions that are passed from parent to child (are inherited).  Reaction to a substance, such as a drug or a medicine, or suddenly stopping the use of a substance (withdrawal).  Stroke.  Developmental disorders such as autism or cerebral palsy.    In some cases, the cause of this condition may not be known. Some people who have a seizure never have another one. Seizures usually do not cause brain damage or permanent problems unless they are prolonged. A person who has repeated seizures over time without a clear cause has a condition called epilepsy.    What increases the risk?  You are more likely to develop this condition if you have:    A family history of epilepsy.  Had a tonic-clonic seizure in the past. This is a type of seizure that involves whole-body contraction of muscles and a loss of consciousness.  A history of head trauma, lack of oxygen at birth, or strokes.    What are the signs or symptoms?  There are many different types of seizures. The symptoms vary depending on the type of seizure you have. Symptoms occur during the seizure and may also occur before a seizure (aura) and after a seizure (postictal).        Symptoms during a seizure    Uncontrollable shaking (convulsions).  Stiffening of the body.  Loss of consciousness.  Head nodding.  Staring.  Not responding to sound or touch.  Loss of bladder or bowel control.        Symptoms before a seizure    Fear or anxiety.  Nausea.  Feeling like the room is spinning (vertigo).  A feeling of having seen or heard something before (déjà vu).  Odd tastes or smells.  Changes in vision, such as seeing flashing lights or spots.        Symptoms after a seizure    Confusion.  Sleepiness.  Headache.  Weakness on one side of the body.    How is this diagnosed?  This condition may be diagnosed based on:    A description of your symptoms. Video of your seizures can be helpful.  Your medical history.  A physical exam.    You may also have tests, including:    Blood tests.  CT scan.  MRI.  Electroencephalogram (EEG). This test measures electrical activity in the brain. An EEG can predict whether seizures will return (recur).  A spinal tap (also called a lumbar puncture). This is the removal and testing of fluid that surrounds the brain and spinal cord.    How is this treated?  Most seizures will stop on their own in under 5 minutes, and no treatment is needed. Seizures that last longer than 5 minutes will usually need treatment. Treatment can include:    Medicines given through an IV.  Avoiding known triggers, such as medicines that you take for another condition.  Medicines to treat epilepsy (antiepileptics), if epilepsy caused your seizures.  Surgery to stop seizures, if you have epilepsy that does not respond to medicines.    Follow these instructions at home:      Medicines    Take over-the-counter and prescription medicines only as told by your health care provider.  Avoid any substances that may prevent your medicine from working properly, such as alcohol.        Activity    Do not drive, swim, or do any other activities that would be dangerous if you had another seizure. Wait until your health care provider says it is safe to do them.  If you live in the [place] check with your local DMV (department of motor vehicles) to find out about local driving laws. Each state has specific rules about when you can legally return to driving.  Get enough rest. Lack of sleep can make seizures more likely to occur.    Educating others     Teach friends and family what to do if you have a seizure. They should:    Lay you on the ground to prevent a fall.  Cushion your head and body.  Loosen any tight clothing around your neck.  Turn you on your side. If vomiting occurs, this helps keep your airway clear.  Not hold you down. Holding you down will not stop the seizure.  Not put anything into your mouth.  Know whether or not you need emergency care. For example, they should get help right away if you have a seizure that lasts longer than 5 minutes or have several seizures in a row.  Stay with you until you recover.    General instructions    Contact your health care provider each time you have a seizure.  Avoid anything that has ever triggered a seizure for you.  Keep a seizure diary. Record what you remember about each seizure, especially anything that might have triggered the seizure.  Keep all follow-up visits as told by your health care provider. This is important.    Contact a health care provider if:  You have another seizure.  You have seizures more often.  Your seizure symptoms change.  You continue to have seizures with treatment.  You have symptoms of an infection or illness. This might increase your risk of having a seizure.    Get help right away if:  You have a seizure that:    Lasts longer than 5 minutes.  Is different than previous seizures.  Leaves you unable to speak or use a part of your body.  Makes it harder to breathe.  You have:    A seizure after a head injury.  Multiple seizures in a row.  Confusion or a severe headache right after a seizure.  You do not wake up immediately after a seizure.  You injure yourself during a seizure.    These symptoms may represent a serious problem that is an emergency. Do not wait to see if the symptoms will go away. Get medical help right away. Call your local emergency services (911 in the [place] Do not drive yourself to the hospital.    Summary  Seizures are caused by abnormal electrical activity in the brain. The activity disrupts normal brain function and can cause various symptoms, such as convulsions, abnormal movements, or a change in consciousness.  There are many causes of seizures, including illnesses, medicines, genetic conditions, head injuries, strokes, tumors, substance abuse, or substance withdrawal.  Most seizures will stop on their own in under 5 minutes. Seizures that last longer than 5 minutes are a medical emergency and require immediate treatment.  Many medicines are used to treat seizures. Take over-the-counter and prescription medicines only as told by your health care provider.    ADDITIONAL NOTES AND INSTRUCTIONS    Please follow up with your Primary MD in 24-48 hr.  Seek immediate medical care for any new/worsening signs or symptoms.

## 2025-04-17 NOTE — ED ADULT TRIAGE NOTE - CHIEF COMPLAINT QUOTE
BIBA from home for witnessed seizure by daughter lasting about 5 mins, as per emt, patient was seated when she seized, post ictal and gradually improving, emt fs 167. states she missed keppra dose since yesterday, 20G left AC, patient is a&ox3 in triage, no complaints of pain

## 2025-04-17 NOTE — ED PROVIDER NOTE - PATIENT PORTAL LINK FT
You can access the FollowMyHealth Patient Portal offered by [facility] by registering at the following website: [email or web address]. By joining [email or web address]’s FollowMyHealth portal, you will also be able to view your health information using other applications (apps) compatible with our system.

## 2025-04-17 NOTE — ED PROVIDER NOTE - NSICDXPASTMEDICALHX_GEN_ALL_CORE_FT
Flovent administered using spacer  Pt and pt's mother educated on proper spacer use and need to rinse mouth after administration  PAST MEDICAL HISTORY:  CVA (cerebrovascular accident)     DM (diabetes mellitus)     HLD (hyperlipidemia)     Mild HTN

## 2025-04-21 LAB — LEVETIRACETAM SERPL-MCNC: 7.5 UG/ML — LOW (ref 10–40)

## 2025-04-29 ENCOUNTER — APPOINTMENT (OUTPATIENT)
Dept: ORTHOPEDIC SURGERY | Facility: CLINIC | Age: 80
End: 2025-04-29

## 2025-05-01 ENCOUNTER — APPOINTMENT (OUTPATIENT)
Dept: GASTROENTEROLOGY | Facility: CLINIC | Age: 80
End: 2025-05-01

## 2025-05-13 ENCOUNTER — APPOINTMENT (OUTPATIENT)
Dept: ENDOCRINOLOGY | Facility: CLINIC | Age: 80
End: 2025-05-13

## 2025-06-16 ENCOUNTER — APPOINTMENT (OUTPATIENT)
Dept: ENDOCRINOLOGY | Facility: CLINIC | Age: 80
End: 2025-06-16

## 2025-06-18 RX ORDER — BLOOD-GLUCOSE SENSOR
EACH MISCELLANEOUS
Qty: 6 | Refills: 1 | Status: ACTIVE | COMMUNITY
Start: 2025-06-18 | End: 1900-01-01

## 2025-06-24 NOTE — ED PROVIDER NOTE - ED STEMI HIDDEN
Monitor CMP  Orders:    Comprehensive metabolic panel; Future    Lipid Panel with Direct LDL reflex; Future     hide

## 2025-06-25 ENCOUNTER — APPOINTMENT (OUTPATIENT)
Dept: ENDOCRINOLOGY | Facility: CLINIC | Age: 80
End: 2025-06-25
Payer: MEDICARE

## 2025-06-25 VITALS
BODY MASS INDEX: 24.16 KG/M2 | OXYGEN SATURATION: 97 % | HEIGHT: 65 IN | HEART RATE: 71 BPM | RESPIRATION RATE: 16 BRPM | WEIGHT: 145 LBS | DIASTOLIC BLOOD PRESSURE: 73 MMHG | SYSTOLIC BLOOD PRESSURE: 131 MMHG

## 2025-06-25 LAB — GLUCOSE BLDC GLUCOMTR-MCNC: 147

## 2025-06-25 PROCEDURE — 95251 CONT GLUC MNTR ANALYSIS I&R: CPT

## 2025-06-25 PROCEDURE — 99214 OFFICE O/P EST MOD 30 MIN: CPT | Mod: 25

## 2025-06-25 PROCEDURE — 36415 COLL VENOUS BLD VENIPUNCTURE: CPT

## 2025-06-27 NOTE — DIETITIAN INITIAL EVALUATION ADULT - REASON INDICATOR FOR ASSESSMENT
Status post umbilical hernia repair with 6.4 cm mesh.  Excellent healing.  Patient needs to follow-up as needed  
Pt seen for nutrition consult for MST score 2 or greater

## 2025-06-30 ENCOUNTER — NON-APPOINTMENT (OUTPATIENT)
Age: 80
End: 2025-06-30

## 2025-06-30 LAB
25(OH)D3 SERPL-MCNC: 16.9 NG/ML
ALBUMIN SERPL ELPH-MCNC: 4.5 G/DL
ALP BLD-CCNC: 75 U/L
ALT SERPL-CCNC: 21 U/L
ANION GAP SERPL CALC-SCNC: 15 MMOL/L
AST SERPL-CCNC: 22 U/L
BILIRUB SERPL-MCNC: 0.6 MG/DL
BUN SERPL-MCNC: 17 MG/DL
CA-I SERPL-SCNC: 5.7 MG/DL
CALCIUM SERPL-MCNC: 10.7 MG/DL
CALCIUM SERPL-MCNC: 10.7 MG/DL
CHLORIDE SERPL-SCNC: 106 MMOL/L
CHOLEST SERPL-MCNC: 129 MG/DL
CO2 SERPL-SCNC: 19 MMOL/L
CREAT SERPL-MCNC: 0.75 MG/DL
CREAT SPEC-SCNC: 74 MG/DL
EGFRCR SERPLBLD CKD-EPI 2021: 81 ML/MIN/1.73M2
ESTIMATED AVERAGE GLUCOSE: 174 MG/DL
FOLATE SERPL-MCNC: 15.3 NG/ML
GLUCOSE SERPL-MCNC: 142 MG/DL
HBA1C MFR BLD HPLC: 7.7 %
HDLC SERPL-MCNC: 55 MG/DL
LDLC SERPL-MCNC: 56 MG/DL
MICROALBUMIN 24H UR DL<=1MG/L-MCNC: 1.8 MG/DL
MICROALBUMIN/CREAT 24H UR-RTO: 25 MG/G
NONHDLC SERPL-MCNC: 74 MG/DL
PARATHYROID HORMONE INTACT: 160 PG/ML
POTASSIUM SERPL-SCNC: 3.8 MMOL/L
PROT SERPL-MCNC: 7.8 G/DL
SODIUM SERPL-SCNC: 140 MMOL/L
T4 FREE SERPL-MCNC: 1.3 NG/DL
TRIGL SERPL-MCNC: 95 MG/DL
TSH SERPL-ACNC: 1.76 UIU/ML
VIT B12 SERPL-MCNC: 280 PG/ML

## 2025-08-19 ENCOUNTER — NON-APPOINTMENT (OUTPATIENT)
Age: 80
End: 2025-08-19

## 2025-08-19 ENCOUNTER — APPOINTMENT (OUTPATIENT)
Dept: NEUROLOGY | Facility: CLINIC | Age: 80
End: 2025-08-19
Payer: MEDICARE

## 2025-08-19 VITALS
HEIGHT: 65 IN | DIASTOLIC BLOOD PRESSURE: 76 MMHG | HEART RATE: 68 BPM | SYSTOLIC BLOOD PRESSURE: 148 MMHG | BODY MASS INDEX: 23.32 KG/M2 | WEIGHT: 140 LBS

## 2025-08-19 VITALS
HEIGHT: 65 IN | WEIGHT: 140 LBS | BODY MASS INDEX: 23.32 KG/M2 | SYSTOLIC BLOOD PRESSURE: 146 MMHG | HEART RATE: 66 BPM | DIASTOLIC BLOOD PRESSURE: 76 MMHG

## 2025-08-19 DIAGNOSIS — R56.9 UNSPECIFIED CONVULSIONS: ICD-10-CM

## 2025-08-19 DIAGNOSIS — I67.9 CEREBROVASCULAR DISEASE, UNSPECIFIED: ICD-10-CM

## 2025-08-19 PROCEDURE — G2211 COMPLEX E/M VISIT ADD ON: CPT

## 2025-08-19 PROCEDURE — 99215 OFFICE O/P EST HI 40 MIN: CPT
